# Patient Record
Sex: MALE | Race: WHITE | NOT HISPANIC OR LATINO | Employment: FULL TIME | URBAN - NONMETROPOLITAN AREA
[De-identification: names, ages, dates, MRNs, and addresses within clinical notes are randomized per-mention and may not be internally consistent; named-entity substitution may affect disease eponyms.]

---

## 2017-10-16 ENCOUNTER — TRANSCRIBE ORDERS (OUTPATIENT)
Dept: ADMINISTRATIVE | Facility: HOSPITAL | Age: 32
End: 2017-10-16

## 2017-10-16 DIAGNOSIS — M25.561 RIGHT KNEE PAIN, UNSPECIFIED CHRONICITY: Primary | ICD-10-CM

## 2017-10-19 ENCOUNTER — HOSPITAL ENCOUNTER (OUTPATIENT)
Dept: MRI IMAGING | Facility: HOSPITAL | Age: 32
Discharge: HOME | End: 2017-10-19
Admitting: PREVENTIVE MEDICINE

## 2017-10-19 DIAGNOSIS — M25.561 RIGHT KNEE PAIN, UNSPECIFIED CHRONICITY: ICD-10-CM

## 2017-10-19 PROCEDURE — 73721 MRI JNT OF LWR EXTRE W/O DYE: CPT

## 2018-07-17 ENCOUNTER — HOSPITAL ENCOUNTER (OUTPATIENT)
Dept: NEUROLOGY | Age: 33
Discharge: HOME | End: 2018-07-17
Payer: MEDICAID

## 2018-07-17 PROCEDURE — 95886 MUSC TEST DONE W/N TEST COMP: CPT | Performed by: PSYCHIATRY & NEUROLOGY

## 2018-07-17 PROCEDURE — 95909 NRV CNDJ TST 5-6 STUDIES: CPT

## 2018-07-17 PROCEDURE — 95886 MUSC TEST DONE W/N TEST COMP: CPT

## 2018-07-17 PROCEDURE — 95909 NRV CNDJ TST 5-6 STUDIES: CPT | Performed by: PSYCHIATRY & NEUROLOGY

## 2018-07-18 ENCOUNTER — HOSPITAL ENCOUNTER (EMERGENCY)
Age: 33
Discharge: HOME | End: 2018-07-18
Attending: EMERGENCY MEDICINE
Payer: MEDICAID

## 2018-07-18 VITALS
SYSTOLIC BLOOD PRESSURE: 151 MMHG | RESPIRATION RATE: 16 BRPM | OXYGEN SATURATION: 96 % | HEIGHT: 71 IN | TEMPERATURE: 98.2 F | DIASTOLIC BLOOD PRESSURE: 81 MMHG | HEART RATE: 55 BPM | BODY MASS INDEX: 28 KG/M2 | WEIGHT: 200 LBS

## 2018-07-18 DIAGNOSIS — R20.2 PARESTHESIA: Primary | ICD-10-CM

## 2018-07-18 DIAGNOSIS — M54.12 CERVICAL RADICULOPATHY: ICD-10-CM

## 2018-07-18 PROCEDURE — 99284 EMERGENCY DEPT VISIT MOD MDM: CPT | Performed by: EMERGENCY MEDICINE

## 2018-07-18 PROCEDURE — 99282 EMERGENCY DEPT VISIT SF MDM: CPT

## 2018-07-18 RX ORDER — GABAPENTIN 300 MG/1
300 CAPSULE ORAL DAILY
Qty: 10 CAPSULE | Refills: 0 | Status: SHIPPED | OUTPATIENT
Start: 2018-07-18 | End: 2022-07-18

## 2018-07-18 ASSESSMENT — ENCOUNTER SYMPTOMS
RHINORRHEA: 0
SORE THROAT: 0
ABDOMINAL PAIN: 0
SHORTNESS OF BREATH: 0
VOMITING: 0
BACK PAIN: 0
DIARRHEA: 0
NAUSEA: 0

## 2018-07-18 ASSESSMENT — PAIN DESCRIPTION - ORIENTATION: ORIENTATION: RIGHT

## 2018-07-18 ASSESSMENT — PAIN SCALES - GENERAL: PAINLEVEL_OUTOF10: 4

## 2018-07-18 ASSESSMENT — PAIN DESCRIPTION - PAIN TYPE: TYPE: ACUTE PAIN

## 2018-07-18 NOTE — ED PROVIDER NOTES
HISTORY   History reviewed. No pertinent past medical history. SURGICAL HISTORY       Past Surgical History:   Procedure Laterality Date    EYE SURGERY      MOUTH SURGERY           CURRENT MEDICATIONS       Discharge Medication List as of 7/18/2018 10:04 AM      CONTINUE these medications which have NOT CHANGED    Details   Montelukast Sodium (SINGULAIR PO) Take by mouth      albuterol sulfate  (90 BASE) MCG/ACT inhaler Inhale 2 puffs into the lungs every 6 hours as needed for Wheezing      predniSONE (DELTASONE) 20 MG tablet Take 20 mg by mouth daily      naproxen (NAPROSYN) 375 MG tablet Take 1 tablet by mouth 2 times daily, Disp-10 tablet, R-0             ALLERGIES     Patient has no known allergies. FAMILY HISTORY     History reviewed. No pertinent family history. SOCIAL HISTORY       Social History     Social History    Marital status:      Spouse name: N/A    Number of children: N/A    Years of education: N/A     Social History Main Topics    Smoking status: Former Smoker    Smokeless tobacco: None    Alcohol use No    Drug use: No    Sexual activity: Not Asked     Other Topics Concern    None     Social History Narrative    None       SCREENINGS             PHYSICAL EXAM    (up to 7 for level 4, 8 or more for level 5)     ED Triage Vitals [07/18/18 0913]   BP Temp Temp Source Pulse Resp SpO2 Height Weight   (!) 151/81 98.2 °F (36.8 °C) Oral 55 16 96 % 5' 11\" (1.803 m) 200 lb (90.7 kg)       Physical Exam   Constitutional: He is oriented to person, place, and time. He appears well-developed and well-nourished. No distress. HENT:   Head: Normocephalic and atraumatic. Eyes: Pupils are equal, round, and reactive to light. Neck: Normal range of motion. Neck supple. Cardiovascular: Normal rate, regular rhythm, normal heart sounds and intact distal pulses. Pulmonary/Chest: Effort normal and breath sounds normal. No respiratory distress. Abdominal: Soft.  Bowel No weakness appreciated. Marybel Clarke [id number] reviewed    CONSULTS:  None    PROCEDURES:  Unless otherwise noted below, none     Procedures    FINAL IMPRESSION      1. Paresthesia    2. Cervical radiculopathy, possible          DISPOSITION/PLAN   DISPOSITION Decision To Discharge 07/18/2018 10:00:01 AM      PATIENT REFERRED TO:  RAMIRO Martinez - CNP  [address]    In 2 days      [address] Brenda Perez [phone]    In 2 days        DISCHARGE MEDICATIONS:  Discharge Medication List as of 7/18/2018 10:04 AM      START taking these medications    Details   gabapentin (NEURONTIN) 300 MG capsule Take 1 capsule by mouth daily for 10 days. ., Disp-10 capsule, R-0Print                (Please note that portions of this note were completed with a voice recognition program.  Efforts were made to edit the dictations but occasionally words are mis-transcribed.)    Ken Rushing MD (electronically signed)  Attending Emergency Physician         Ken Rushing MD  07/18/18 2783

## 2019-01-10 ENCOUNTER — TRANSCRIBE ORDERS (OUTPATIENT)
Dept: PHYSICAL THERAPY | Facility: HOSPITAL | Age: 34
End: 2019-01-10

## 2019-01-10 ENCOUNTER — HOSPITAL ENCOUNTER (OUTPATIENT)
Dept: PHYSICAL THERAPY | Facility: HOSPITAL | Age: 34
Setting detail: THERAPIES SERIES
Discharge: HOME | End: 2019-01-10

## 2019-01-10 DIAGNOSIS — M54.2 CERVICALGIA: Primary | ICD-10-CM

## 2019-01-10 PROCEDURE — 97110 THERAPEUTIC EXERCISES: CPT | Performed by: PHYSICAL THERAPIST

## 2019-01-10 PROCEDURE — 97161 PT EVAL LOW COMPLEX 20 MIN: CPT | Performed by: PHYSICAL THERAPIST
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
## 2019-01-15 ENCOUNTER — HOSPITAL ENCOUNTER (OUTPATIENT)
Dept: PHYSICAL THERAPY | Facility: HOSPITAL | Age: 34
Setting detail: THERAPIES SERIES
Discharge: HOME | End: 2019-01-15

## 2019-01-15 DIAGNOSIS — M54.2 CERVICALGIA: Primary | ICD-10-CM

## 2019-01-15 PROCEDURE — 97110 THERAPEUTIC EXERCISES: CPT

## 2019-01-15 PROCEDURE — 97140 MANUAL THERAPY 1/> REGIONS: CPT
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
## 2019-01-17 ENCOUNTER — HOSPITAL ENCOUNTER (OUTPATIENT)
Dept: PHYSICAL THERAPY | Facility: HOSPITAL | Age: 34
Setting detail: THERAPIES SERIES
Discharge: HOME | End: 2019-01-17

## 2019-01-17 DIAGNOSIS — M54.2 CERVICALGIA: Primary | ICD-10-CM

## 2019-01-17 PROCEDURE — 97140 MANUAL THERAPY 1/> REGIONS: CPT | Performed by: PHYSICAL THERAPY ASSISTANT

## 2019-01-17 PROCEDURE — 97110 THERAPEUTIC EXERCISES: CPT | Performed by: PHYSICAL THERAPY ASSISTANT
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
## 2019-01-23 ENCOUNTER — HOSPITAL ENCOUNTER (OUTPATIENT)
Dept: PHYSICAL THERAPY | Facility: HOSPITAL | Age: 34
Setting detail: THERAPIES SERIES
Discharge: HOME | End: 2019-01-23

## 2019-01-23 DIAGNOSIS — M54.2 CERVICALGIA: Primary | ICD-10-CM

## 2019-01-23 PROCEDURE — 97110 THERAPEUTIC EXERCISES: CPT | Performed by: PHYSICAL THERAPIST

## 2019-01-23 NOTE — THERAPY TREATMENT NOTE
"    Outpatient Physical Therapy Ortho Treatment Note  [facility]     Patient Name: Jayce Gonzalez III  : 1985  MRN: [id number]  Today's Date: 2019      Visit Date: 2019  Subjective Improvement:   \"not yet\"    Attendance:   Approved:        7 visits/28 units 1-10/2-9   MD follow up: TBD           date:    2019      Visit Dx:    ICD-10-CM ICD-9-CM   1. Cervicalgia M54.2 723.1       There is no problem list on file for this patient.       Past Medical History:   Diagnosis Date   • GERD (gastroesophageal reflux disease)         Past Surgical History:   Procedure Laterality Date   • EYE SURGERY     • KNEE ARTHROSCOPY Right        PT Ortho     Row Name 19 0800       Subjective Comments    Subjective Comments  pt reports no change in R UE paresthesia, feels his neck is looser and has more ROM since starting PT.   -BS       Precautions and Contraindications    Precautions/Limitations  no known precautions/limitations  -BS       Subjective Pain    Able to rate subjective pain?  yes  -BS    Pre-Treatment Pain Level  2  -BS    Post-Treatment Pain Level  2  -BS      User Key  (r) = Recorded By, (t) = Taken By, (c) = Cosigned By    Initials Name Provider Type    Romero Villanueva, PT Physical Therapist                      PT Assessment/Plan     Row Name 19 1000          PT Assessment    Assessment Comments  limited by R>L cervical AROM, possibly due to UT tightness. Reduced R UE paresthesia with flexion based ROM of lower cervical/upper thoracic spine. Pt with reduced neck pain with manual cervical traction. Will attempt mechanical cervical traction to assess effect on R UE and neck symptoms.  -BS        PT Plan    PT Frequency  2x/week  -BS     Predicted Duration of Therapy Intervention (Therapy Eval)  3 weeks  -BS     PT Plan Comments  attempt mechanical cervical traction. Start at 18-20 lbs.  -BS       User Key  (r) = Recorded By, (t) = Taken By, (c) = Cosigned By    " Initials Name Provider Type    BS Romero Garcia, PT Physical Therapist              Exercises     Row Name 01/23/19 0800             Subjective Comments    Subjective Comments  pt reports no change in R UE paresthesia, feels his neck is looser and has more ROM since starting PT.   -BS         Subjective Pain    Able to rate subjective pain?  yes  -BS      Pre-Treatment Pain Level  2  -BS      Post-Treatment Pain Level  2  -BS         Exercise 1    Exercise Name 1  PRO ll UE  -BS      Time 1  8'  -BS      Additional Comments  fwd/bwd-4 min ea  -BS         Exercise 2    Exercise Name 2  seated thoracic flex w/ cervical flex  -BS      Sets 2  2  -BS      Reps 2  10  -BS      Time 2  reduced R UE paresthesia   -BS         Exercise 3    Exercise Name 3  seated cervical flex w/ self OP  -BS      Sets 3  1  -BS      Reps 3  10  -BS         Exercise 4    Exercise Name 4  elephant man stretch  -BS      Sets 4  1  -BS      Reps 4  10  -BS         Exercise 5    Exercise Name 5  seated cervical extrension  -BS      Sets 5  1  -BS      Reps 5  10  -BS         Exercise 6    Exercise Name 6  seated cervical retractions w/ self OP  -BS      Sets 6  1  -BS      Reps 6  10  -BS         Exercise 7    Exercise Name 7  supine cervical retractions  -BS      Sets 7  1  -BS      Reps 7  10  -BS         Exercise 8    Exercise Name 8  supine cervical ext w/ head off table  -BS      Sets 8  1  -BS      Reps 8  5  -BS      Additional Comments  d/c'd-increased dizziness  -BS         Exercise 9    Exercise Name 9  seated thoracic ext S w/ self mob (leaning against towel roll)  -BS      Sets 9  1  -BS      Reps 9  10  -BS        User Key  (r) = Recorded By, (t) = Taken By, (c) = Cosigned By    Initials Name Provider Type    BS Romero Garcia, PT Physical Therapist                         PT OP Goals     Row Name 01/23/19 0800          PT Short Term Goals    STG Date to Achieve  01/31/19  -BS     STG 1  Pt independent with HEP  -BS     STG 1  Progress  Ongoing  -BS     STG 2  Improve cervical AROM to % (all planes)  -BS     STG 2 Progress  Ongoing  -BS     STG 3  Reduce neck pain by 75% performing daily tasks  -BS     STG 3 Progress  Ongoing  -BS     STG 4  Improve R UE light touch sensation (all dermatomes C4-T1) to WFL  -BS     STG 4 Progress  Ongoing  -BS        Time Calculation    PT Goal Re-Cert Due Date  01/31/19  -BS       User Key  (r) = Recorded By, (t) = Taken By, (c) = Cosigned By    Initials Name Provider Type    Romero Villanueva, PT Physical Therapist          Therapy Education  Given: HEP, Symptoms/condition management  Program: New, Reinforced  How Provided: Verbal, Demonstration  Provided to: Patient  Level of Understanding: Teach back education performed              Time Calculation:   Start Time: 0804  Stop Time: 0850  Time Calculation (min): 46 min  Total Timed Code Minutes- PT: 46 minute(s)  Therapy Suggested Charges     Code   Minutes Charges    None           Therapy Charges for Today     Code Description Service Date Service Provider Modifiers Qty    70147270411 HC PT THER PROC EA 15 MIN 1/23/2019 Romero Garcia, PT GP 3                    Romero Garcia, PT  1/23/2019

## 2019-01-25 ENCOUNTER — HOSPITAL ENCOUNTER (OUTPATIENT)
Dept: PHYSICAL THERAPY | Facility: HOSPITAL | Age: 34
Setting detail: THERAPIES SERIES
Discharge: HOME | End: 2019-01-25

## 2019-01-25 DIAGNOSIS — M54.2 CERVICALGIA: Primary | ICD-10-CM

## 2019-01-25 PROCEDURE — 97140 MANUAL THERAPY 1/> REGIONS: CPT

## 2019-01-25 PROCEDURE — 97110 THERAPEUTIC EXERCISES: CPT

## 2019-01-25 PROCEDURE — 97012 MECHANICAL TRACTION THERAPY: CPT

## 2019-01-25 NOTE — THERAPY TREATMENT NOTE
"    Outpatient Physical Therapy Ortho Treatment Note  [facility]  Za Cobian PTA  19  8:54 AM       Patient Name: Jayce Gonzalez III  : 1985  MRN: [id number]  Today's Date: 2019      Visit Date: 2019    Subjective Improvement: \"motion has improved\"       Attendance:    Approved: 7 visits/28 units -10/2-           MD follow up: TBD            date: 19        Visit Dx:    ICD-10-CM ICD-9-CM   1. Cervicalgia M54.2 723.1       There is no problem list on file for this patient.       Past Medical History:   Diagnosis Date   • GERD (gastroesophageal reflux disease)         Past Surgical History:   Procedure Laterality Date   • EYE SURGERY     • KNEE ARTHROSCOPY Right        PT Ortho     Row Name 19 0800       Precautions and Contraindications    Precautions/Limitations  no known precautions/limitations  -KM       Posture/Observations    Posture/Observations Comments  Fwd/rounded shoulders. Fair postural awarenss  -KM    Row Name 19 0800       Subjective Comments    Subjective Comments  pt reports no change in R UE paresthesia, feels his neck is looser and has more ROM since starting PT.   -BS       Precautions and Contraindications    Precautions/Limitations  no known precautions/limitations  -BS       Subjective Pain    Able to rate subjective pain?  yes  -BS    Pre-Treatment Pain Level  2  -BS    Post-Treatment Pain Level  2  -BS      User Key  (r) = Recorded By, (t) = Taken By, (c) = Cosigned By    Initials Name Provider Type     Za Cobian PTA Physical Therapy Assistant    Romero Villanueva, PT Physical Therapist                      PT Assessment/Plan     Row Name 19 0800          PT Assessment    Functional Limitations  Performance in work activities;Performance in sport activities;Performance in self-care ADL;Performance in leisure activities  -KM     Impairments  Sensation;Range of motion;Posture;Pain;Muscle strength  " -KM     Assessment Comments  Attempted mechanical cervical traction and pt reports that hand felt like it was waking up some throughout RX- wishes to increase weight some next time.  -KM     Rehab Potential  Good  -KM     Patient/caregiver participated in establishment of treatment plan and goals  Yes  -KM     Patient would benefit from skilled therapy intervention  Yes  -KM        PT Plan    PT Frequency  2x/week  -KM     Predicted Duration of Therapy Intervention (Therapy Eval)  3 weeks  -KM     PT Plan Comments  Follow up results of cervical mechanical traction.  -KM       User Key  (r) = Recorded By, (t) = Taken By, (c) = Cosigned By    Initials Name Provider Type    Za Laura PTA Physical Therapy Assistant          Modalities     Row Name 01/25/19 0800             Traction 14174    Traction Type  Cervical  -KM      Rx Minutes  10  -KM      Position  Hook-lying  -KM      Weight  18  -KM      Hold  60  -KM      Relax  10  -KM        User Key  (r) = Recorded By, (t) = Taken By, (c) = Cosigned By    Initials Name Provider Type    Za Laura PTA Physical Therapy Assistant          Exercises     Row Name 01/25/19 0800             Precautions    Existing Precautions/Restrictions  no known precautions/restrictions  -KM         Subjective Comments    Subjective Comments  pt states that he feels that his motion is better since starting therapy  -KM         Subjective Pain    Able to rate subjective pain?  yes  -KM      Pre-Treatment Pain Level  1  -KM      Post-Treatment Pain Level  1  -KM         Aquatics    Aquatics performed?  No  -KM         Exercise 1    Exercise Name 1  PRO ll UE  -KM      Time 1  10 min  -KM      Additional Comments  fwd/bkw L3.0  -KM         Exercise 2    Exercise Name 2  UT S  -KM      Reps 2  2  -KM      Time 2  30 sec hold  -KM         Exercise 3    Exercise Name 3  Levator S  -KM      Reps 3  2  -KM      Time 3  30 sec hold  -KM         Exercise 4    Exercise  Name 4  Elephant S  -KM      Reps 4  2  -KM      Time 4  30 sec hold  -KM         Exercise 5    Exercise Name 5  Tband mid rows  -KM      Sets 5  2  -KM      Reps 5  10  -KM      Additional Comments  red  -KM         Exercise 6    Exercise Name 6  Tband shoulder ext  -KM      Sets 6  2  -KM      Reps 6  10  -KM      Additional Comments  red  -KM         Exercise 7    Exercise Name 7  Seated thoracic extension against bolster  -KM      Sets 7  1  -KM      Reps 7  10  -KM      Time 7  10 sec hold  -KM         Exercise 8    Exercise Name 8  Supine chin tucks  -KM      Sets 8  1  -KM      Reps 8  10  -KM      Time 8  5 sec hold  -KM         Exercise 9    Exercise Name 9  Manual  -KM      Time 9  8 min  -KM         Exercise 10    Exercise Name 10  Cervical mechanical traction  -KM      Time 10  10 min  -KM        User Key  (r) = Recorded By, (t) = Taken By, (c) = Cosigned By    Initials Name Provider Type    Za Laura PTA Physical Therapy Assistant                        Manual Rx (last 36 hours)      Manual Treatments     Row Name 01/25/19 0700             Manual Rx 1    Manual Rx 1 Location  CS, seated  -KM      Manual Rx 1 Type  STM/MFR UT/LS, rhomboids  -KM      Manual Rx 1 Duration  8'  -KM        User Key  (r) = Recorded By, (t) = Taken By, (c) = Cosigned By    Initials Name Provider Type    Za Laura PTA Physical Therapy Assistant          PT OP Goals     Row Name 01/25/19 0800          PT Short Term Goals    STG Date to Achieve  01/31/19  -KM     STG 1  Pt independent with HEP  -KM     STG 1 Progress  Ongoing  -KM     STG 2  Improve cervical AROM to % (all planes)  -KM     STG 2 Progress  Ongoing  -KM     STG 3  Reduce neck pain by 75% performing daily tasks  -KM     STG 3 Progress  Ongoing  -KM     STG 4  Improve R UE light touch sensation (all dermatomes C4-T1) to WFL  -KM     STG 4 Progress  Ongoing  -KM        Time Calculation    PT Goal Re-Cert Due Date  01/31/19  -KM        User Key  (r) = Recorded By, (t) = Taken By, (c) = Cosigned By    Initials Name Provider Type    Za Laura, FRANCISCA Physical Therapy Assistant          Therapy Education  Given: HEP, Symptoms/condition management  Program: New, Reinforced  How Provided: Verbal, Demonstration  Provided to: Patient  Level of Understanding: Teach back education performed              Time Calculation:   Start Time: 0800  Stop Time: 0853  Time Calculation (min): 53 min  PT Non-Billable Time (min): 10 min  Total Timed Code Minutes- PT: 43 minute(s)  Therapy Suggested Charges     Code   Minutes Charges    None           Therapy Charges for Today     Code Description Service Date Service Provider Modifiers Qty    44779379609 HC PT THER PROC EA 15 MIN 1/25/2019 Za Snyder, PTA GP 2    77890766805 HC PT MANUAL THERAPY EA 15 MIN 1/25/2019 Za Snyder, PTA GP 1    59918473701 HC PT TRACTION CERVICAL 1/25/2019 Za Snyder, PTA GP 1                    Za Snyder PTA  1/25/2019

## 2019-01-28 ENCOUNTER — OFFICE VISIT (OUTPATIENT)
Dept: SLEEP MEDICINE | Facility: HOSPITAL | Age: 34
End: 2019-01-28

## 2019-01-28 VITALS
OXYGEN SATURATION: 96 % | HEIGHT: 71 IN | DIASTOLIC BLOOD PRESSURE: 90 MMHG | BODY MASS INDEX: 33.18 KG/M2 | SYSTOLIC BLOOD PRESSURE: 125 MMHG | WEIGHT: 237 LBS | HEART RATE: 118 BPM

## 2019-01-28 DIAGNOSIS — G47.33 OBSTRUCTIVE SLEEP APNEA, ADULT: Primary | ICD-10-CM

## 2019-01-28 PROCEDURE — 99203 OFFICE O/P NEW LOW 30 MIN: CPT | Performed by: INTERNAL MEDICINE

## 2019-01-28 RX ORDER — GABAPENTIN 300 MG/1
300 CAPSULE ORAL
COMMUNITY
Start: 2018-07-18 | End: 2020-05-19

## 2019-01-28 RX ORDER — RANITIDINE 150 MG/1
TABLET ORAL
COMMUNITY
Start: 2019-01-23 | End: 2020-07-02

## 2019-01-28 RX ORDER — ALBUTEROL SULFATE 90 UG/1
AEROSOL, METERED RESPIRATORY (INHALATION)
COMMUNITY
End: 2022-04-11

## 2019-01-28 NOTE — PROGRESS NOTES
New Patient Sleep Medicine Consultation    Encounter Date: 1/28/2019         Patient's PCP: Ruby Feliciano APRN  Referring provider: No ref. provider found  Reason for consultation chief complaint: snoring, witnessed apneas and excessive daytime sleepiness    Jayce Gonzalez III is a 33 y.o. male who admits to snoring, unrestful sleep, gasping during sleep, excessive daytime sleepiness, stop breathing during sleep, sleeping less than 6 hours per night, Disturbed or restless sleep, choking duing sleep, Up to the bathroom at night, night sweats, teeth grinding, difficulty falling asleep and difficulty staying asleep. He denies cataplexy, sleep paralysis, or hypnagogic hallucinations. His bedtime is ~ 2015. He  falls asleep after 10-20 minutes, and is up 5 times per night. He wakes up ~ 3250-5484. He endorses 5-6 hours of sleep. He drinks 0 cups of coffee, 0 teas, and 0 sodas per day. He drinks 0 alcoholic beverages per week. He is not a current smoker. He does not take sedatives or hypnotics. He has some sleepiness with driving. He naps unintentionally.    Gay - 11    Past Medical History:   Diagnosis Date   • GERD (gastroesophageal reflux disease)      Social History     Socioeconomic History   • Marital status:      Spouse name: Not on file   • Number of children: Not on file   • Years of education: Not on file   • Highest education level: Not on file   Social Needs   • Financial resource strain: Not on file   • Food insecurity - worry: Not on file   • Food insecurity - inability: Not on file   • Transportation needs - medical: Not on file   • Transportation needs - non-medical: Not on file   Occupational History   • Not on file   Tobacco Use   • Smoking status: Former Smoker   Substance and Sexual Activity   • Alcohol use: No     Frequency: Never   • Drug use: Not on file   • Sexual activity: Not on file   Other Topics Concern   • Not on file   Social History Narrative   • Not on file   , 2  "kids  Unemployed but was [employer]   No family history on file.  1 brothers and 0 sisters  Other family history + for: cancer  Smoking history: smoked 0.5-1 ppds from age 13 until 16  FH of sleep disorders: none known    Review of Systems:  Constitutional: negative  Eyes: negative  Ears, nose, mouth, throat, and face: negative  Respiratory: negative  Cardiovascular: negative  Gastrointestinal: negative  Genitourinary:negative  Integument/breast: negative  Hematologic/lymphatic: negative  Musculoskeletal:negative  Neurological: positive for neck pain  Behavioral/Psych: positive for sexual difficulty  Endocrine: negative  Allergic/Immunologic: negative Patient advised to discuss any positive ROS with PCP.      Vitals:    01/28/19 1116   BP: 125/90   Pulse: 118   SpO2: 96%     Body mass index is 33.05 kg/m². Patient's Body mass index is 33.05 kg/m². BMI is above normal parameters. Recommendations include: referral to primary care.  Neck circumference: 16\"          General: Alert. Cooperative. Well developed. No acute distress.             Head:  Normocephalic. Symmetrical. Atraumatic.              Eyes: Sclera clear. No icterus. PERRLA. Mild left eye strabismus - s/p surgery as a child             Ears: No deformities. Normal hearing.             Nose: No septal deviation. No drainage.          Throat: No oral lesions. No thrush. Moist mucous membranes. Trachea midline    Tongue is enlarged    Dentition is fair with small mandible, internal rotation of lower teeth       Pharynx: Posterior pharyngeal pillars are narrow    Mallampati score of IV (only hard palate visible) with low lying soft palate    Pharynx is normal with unrermarkable tonsils   Chest Wall:  Normal shape. Symmetric expansion with respiration. No tenderness.          Lungs:  Clear to auscultation bilaterally. No wheezes. No rhonchi. No rales. Respirations regular, even and unlabored.            Heart:  Regular rhythm and normal rate. Normal " S1 and S2. No murmur.     Abdomen:  Soft, non-tender and non-distended. Normal bowel sounds. No masses.  Extremities:  Moves all extremities well. No edema.           Pulses: Pulses palpable and equal bilaterally.               Skin: Dry. Intact. No bleeding. No rash.           Neuro: Moves all 4 extremities and cranial nerves grossly intact.  Psychiatric: Normal mood and affect.      Current Outpatient Medications:   •  gabapentin (NEURONTIN) 300 MG capsule, Take 300 mg by mouth., Disp: , Rfl:   •  albuterol sulfate  (90 Base) MCG/ACT inhaler, Inhale., Disp: , Rfl:   •  raNITIdine (ZANTAC) 150 MG tablet, , Disp: , Rfl:     WBC   Date Value Ref Range Status   05/22/2015 8.14 4.80 - 10.80 K/mcL Final     RBC   Date Value Ref Range Status   05/22/2015 4.94 4.80 - 5.90 M/mcL Final     Hemoglobin   Date Value Ref Range Status   05/22/2015 14.8 14.0 - 18.0 g/dL Final     Hematocrit   Date Value Ref Range Status   05/22/2015 42.7 40.0 - 52.0 % Final     MCV   Date Value Ref Range Status   05/22/2015 86.4 82.0 - 95.0 fL Final     MCH   Date Value Ref Range Status   05/22/2015 30.0 28.0 - 32.0 pg Final     MCHC   Date Value Ref Range Status   05/22/2015 34.7 33.0 - 36.0 gm/dL Final     RDW   Date Value Ref Range Status   05/22/2015 13.6 12 - 15 % Final     RDW-SD   Date Value Ref Range Status   05/22/2015 42.4 40.0 - 54.0 fL Final     Platelets   Date Value Ref Range Status   05/22/2015 231 130 - 400 K/mcL Final     Neutrophil Rel %   Date Value Ref Range Status   05/22/2015 53.90 39.00 - 78.00 % Final     Lymphocyte Rel %   Date Value Ref Range Status   05/22/2015 37.00 15.00 - 45.00 % Final     Monocyte Rel %   Date Value Ref Range Status   05/22/2015 5.90 4.00 - 12.00 % Final     Eosinophil Rel %   Date Value Ref Range Status   05/22/2015 2.90 0.00 - 4.00 % Final     Basophil Rel %   Date Value Ref Range Status   05/22/2015 0.20 0.00 - 2.00 % Final     Neutrophils Absolute   Date Value Ref Range Status    05/22/2015 4.38 1.87 - 8.40 K/mcL Final     Lymphocytes Absolute   Date Value Ref Range Status   05/22/2015 3.01 0.72 - 4.86 K/mcL Final     Monocytes Absolute   Date Value Ref Range Status   05/22/2015 0.48 0.19 - 1.30 K/mcL Final     Eosinophils Absolute   Date Value Ref Range Status   05/22/2015 0.24 0.00 - 0.70 K/mcL Final     Basophils Absolute   Date Value Ref Range Status   05/22/2015 0.02 0.00 - 0.20 K/mcL Final       ASSESSMENT:  1. Obstructive sleep apnea   1. Check home sleep study   2. Neck pain  1. Per pcp - on gabapentin    RTC in 3 months         This document has been electronically signed by Cooper Garcia MD on January 28, 2019         CC: Ruby Feliciano, WHITNEY          No ref. provider found

## 2019-01-29 ENCOUNTER — HOSPITAL ENCOUNTER (OUTPATIENT)
Dept: PHYSICAL THERAPY | Facility: HOSPITAL | Age: 34
Setting detail: THERAPIES SERIES
Discharge: HOME | End: 2019-01-29

## 2019-01-29 DIAGNOSIS — M54.2 CERVICALGIA: Primary | ICD-10-CM

## 2019-01-29 PROCEDURE — 97012 MECHANICAL TRACTION THERAPY: CPT | Performed by: PHYSICAL THERAPIST

## 2019-01-29 PROCEDURE — 97110 THERAPEUTIC EXERCISES: CPT | Performed by: PHYSICAL THERAPIST

## 2019-01-29 NOTE — THERAPY TREATMENT NOTE
"    Outpatient Physical Therapy Ortho Treatment Note  [facility]     Patient Name: Jayce Gonzalez III  : 1985  MRN: [id number]  Today's Date: 2019      Visit Date: 2019     Subjective Improvement: \"motion has improved\"       Attendance: 56/6   Approved: 7 visits/28 units 1-10/2-9           MD follow up: TBD            date: 19            Visit Dx:    ICD-10-CM ICD-9-CM   1. Cervicalgia M54.2 723.1       There is no problem list on file for this patient.       Past Medical History:   Diagnosis Date   • GERD (gastroesophageal reflux disease)         Past Surgical History:   Procedure Laterality Date   • EYE SURGERY     • KNEE ARTHROSCOPY Right        PT Ortho     Row Name 19 1000       Subjective Comments    Subjective Comments  Pt states he felt like the traction helped, was a little sore the next day but okay  -KG       Precautions and Contraindications    Precautions/Limitations  no known precautions/limitations  -KG       Subjective Pain    Post-Treatment Pain Level  4  -KG       Posture/Observations    Posture/Observations Comments  No acute distress. Fair postural awarness. Forward, rounded shoulders posture.  -KG      User Key  (r) = Recorded By, (t) = Taken By, (c) = Cosigned By    Initials Name Provider Type    KG Nupur Orr, PT Physical Therapist                      PT Assessment/Plan     Row Name 19 1000          PT Assessment    Functional Limitations  Performance in work activities;Performance in sport activities;Performance in self-care ADL;Performance in leisure activities  -KG     Impairments  Sensation;Range of motion;Posture;Pain;Muscle strength  -KG     Assessment Comments  Pt did well with new therex this date for cervical stability. Frequent cuing required for good posture throughout but responds to cues well. Pt reports cervical mechanical traction felt good again this date, had improved symptoms in RUE during traction.  -KG     Rehab " Potential  Good  -KG     Patient/caregiver participated in establishment of treatment plan and goals  Yes  -KG     Patient would benefit from skilled therapy intervention  Yes  -KG        PT Plan    PT Frequency  2x/week  -KG     Predicted Duration of Therapy Intervention (Therapy Eval)  3 weeks  -KG     PT Plan Comments  Continue cervical mechanical traction. Cervical isometrics seated next visit. Continue cervical/scap stability activities. Possible doorway stretch or sup pec stretch next.  -KG       User Key  (r) = Recorded By, (t) = Taken By, (c) = Cosigned By    Initials Name Provider Type    ISATU Nupur Orr, PT Physical Therapist          Modalities     Row Name 01/29/19 1000             Ice    Patient denies application of Ice  Yes  -KG      Patient reports will apply ice at home to involved area  Yes  -KG         Traction 76956    Traction Type  Cervical  -KG      Rx Minutes  10  -KG      Position  Hook-lying  -KG      Weight  19  -KG      Hold  60  -KG      Relax  10  -KG        User Key  (r) = Recorded By, (t) = Taken By, (c) = Cosigned By    Initials Name Provider Type    ISATU Nupur Orr, PT Physical Therapist          Exercises     Row Name 01/29/19 1000             Precautions    Existing Precautions/Restrictions  no known precautions/restrictions  -KG         Subjective Comments    Subjective Comments  Pt states he felt like the traction helped, was a little sore the next day but okay  -KG         Subjective Pain    Able to rate subjective pain?  yes  -KG      Pre-Treatment Pain Level  4  -KG      Post-Treatment Pain Level  4  -KG         Aquatics    Aquatics performed?  No  -KG         Exercise 1    Exercise Name 1  PRO ll UE  -KG      Time 1  8 min  -KG      Additional Comments  fwd/bwd-4 min ea  -KG         Exercise 2    Exercise Name 2  UT S  -KG      Cueing 2  Verbal  -KG      Reps 2  2  -KG      Time 2  30 sec hold  -KG         Exercise 3    Exercise Name 3  Levator S  -KG      Cueing 3  " Verbal  -KG      Reps 3  2  -KG      Time 3  30 sec hold  -KG         Exercise 4    Exercise Name 4  Seated thoracic extension against bolster  -KG      Cueing 4  Verbal  -KG      Sets 4  1  -KG      Reps 4  10  -KG      Time 4  10\" hold  -KG         Exercise 5    Exercise Name 5  PN No $ Ret w/ER posture and N gliding  -KG      Cueing 5  Verbal  -KG      Sets 5  2  -KG      Reps 5  10  -KG         Exercise 6    Exercise Name 6  Elephant stretch  -KG      Cueing 6  Verbal  -KG      Reps 6  3  -KG      Time 6  15\" hold  -KG      Additional Comments  Hands behind head  -KG         Exercise 7    Exercise Name 7  Tband mid rows/posture re-ed  -KG      Cueing 7  Verbal  -KG      Sets 7  2  -KG      Reps 7  10  -KG      Additional Comments  Red tband  -KG         Exercise 8    Exercise Name 8  Tband shoulder ext with scap retraction  -KG      Cueing 8  Verbal  -KG      Sets 8  2  -KG      Reps 8  10  -KG      Additional Comments  Red tband  -KG         Exercise 9    Exercise Name 9  Supine cervical isometrics bilateral sidebend  -KG      Cueing 9  Verbal;Tactile  -KG      Sets 9  1  -KG      Reps 9  10 ea  -KG      Time 9  5\" hold  -KG         Exercise 10    Exercise Name 10  Supine chin tucks  -KG      Cueing 10  Verbal;Tactile  -KG      Sets 10  1  -KG      Reps 10  15  -KG      Time 10  5\" hold  -KG         Exercise 11    Exercise Name 11  Cervical mechanical traction  -KG      Time 11  10 min  -KG      Additional Comments  See modalities section for details.  -KG        User Key  (r) = Recorded By, (t) = Taken By, (c) = Cosigned By    Initials Name Provider Type    KG Nupur Orr, PT Physical Therapist                        Manual Rx (last 36 hours)      Manual Treatments     Row Name 01/29/19 1000             Manual Rx 1    Manual Rx 1 Location  Cervical spine  -KG      Manual Rx 1 Type  STM/MFR UT/LS, rhomboids  -KG      Manual Rx 1 Duration  7 min  -KG        User Key  (r) = Recorded By, (t) = Taken By, (c) " = Cosigned By    Initials Name Provider Type    ISATU Nupur Orr, PT Physical Therapist          PT OP Goals     Row Name 01/29/19 1000          PT Short Term Goals    STG Date to Achieve  01/31/19  -KG     STG 1  Pt independent with HEP  -KG     STG 1 Progress  Ongoing  -KG     STG 2  Improve cervical AROM to % (all planes)  -KG     STG 2 Progress  Ongoing  -KG     STG 3  Reduce neck pain by 75% performing daily tasks  -KG     STG 3 Progress  Ongoing  -KG     STG 4  Improve R UE light touch sensation (all dermatomes C4-T1) to WFL  -KG     STG 4 Progress  Ongoing  -KG        Time Calculation    PT Goal Re-Cert Due Date  01/31/19  -KG       User Key  (r) = Recorded By, (t) = Taken By, (c) = Cosigned By    Initials Name Provider Type    ISATU Nupur Orr, PT Physical Therapist          Therapy Education  Given: HEP, Symptoms/condition management, Pain management, Posture/body mechanics  Program: Reinforced  How Provided: Verbal  Provided to: Patient  Level of Understanding: Verbalized, Demonstrated              Time Calculation:   Start Time: 1017  Stop Time: 1112  Time Calculation (min): 55 min  Total Timed Code Minutes- PT: 45 minute(s)  Therapy Suggested Charges     Code   Minutes Charges    None           Therapy Charges for Today     Code Description Service Date Service Provider Modifiers Qty    52066928500 HC PT THER PROC EA 15 MIN 1/29/2019 Nupur Orr, PT GP 3    48770263864 HC PT TRACTION CERVICAL 1/29/2019 Nupur Orr, PT GP 1                    Nupru Orr, PT  1/29/2019

## 2019-01-31 ENCOUNTER — HOSPITAL ENCOUNTER (OUTPATIENT)
Dept: SLEEP MEDICINE | Facility: HOSPITAL | Age: 34
Discharge: HOME | End: 2019-01-31
Attending: INTERNAL MEDICINE | Admitting: INTERNAL MEDICINE

## 2019-01-31 ENCOUNTER — HOSPITAL ENCOUNTER (OUTPATIENT)
Dept: PHYSICAL THERAPY | Facility: HOSPITAL | Age: 34
Setting detail: THERAPIES SERIES
Discharge: HOME | End: 2019-01-31

## 2019-01-31 DIAGNOSIS — G47.33 OBSTRUCTIVE SLEEP APNEA, ADULT: ICD-10-CM

## 2019-01-31 DIAGNOSIS — M54.2 CERVICALGIA: Primary | ICD-10-CM

## 2019-01-31 PROCEDURE — 95806 SLEEP STUDY UNATT&RESP EFFT: CPT

## 2019-01-31 PROCEDURE — 97110 THERAPEUTIC EXERCISES: CPT | Performed by: PHYSICAL THERAPIST

## 2019-01-31 PROCEDURE — 95806 SLEEP STUDY UNATT&RESP EFFT: CPT | Performed by: INTERNAL MEDICINE

## 2019-01-31 PROCEDURE — 97140 MANUAL THERAPY 1/> REGIONS: CPT | Performed by: PHYSICAL THERAPIST

## 2019-01-31 PROCEDURE — 97012 MECHANICAL TRACTION THERAPY: CPT | Performed by: PHYSICAL THERAPIST

## 2019-02-01 NOTE — THERAPY PROGRESS REPORT/RE-CERT
Outpatient Physical Therapy Ortho Progress Note  [facility]     Patient Name: Jayce Gonzalez III  : 1985  MRN: [id number]  Today's Date: 2019      Visit Date: 2019     Subjective Improvement: 30-40%       Attendance:    Approved: 7 visits/28 units 1-10/2-9           MD follow up: TBD            date: 19        There is no problem list on file for this patient.       Past Medical History:   Diagnosis Date   • GERD (gastroesophageal reflux disease)         Past Surgical History:   Procedure Laterality Date   • EYE SURGERY     • KNEE ARTHROSCOPY Right        Visit Dx:     ICD-10-CM ICD-9-CM   1. Cervicalgia M54.2 723.1           PT Ortho     Row Name 19 1000       Subjective Comments    Subjective Comments  Pt states he was more sore yesterday in his neck. Not sure what caused it. States he feel like the traction is helping his RUE nymbness but it may be making his neck sore too. Overall reports ~40% improvement. States his motion in his neck is getting better.  -KG       Precautions and Contraindications    Precautions/Limitations  no known precautions/limitations  -KG       Subjective Pain    Post-Treatment Pain Level  2  -KG       Posture/Observations    Posture/Observations Comments  No acute distress. Fair postural awarness. Forward, rounded shoulders posture.  -KG       Quarter Clearing    Quarter Clearing  Upper Quarter Clearing  -KG       Sensory Screen for Light Touch- Upper Quarter Clearing    C4 (posterior shoulder)  Bilateral:;Intact  -KG    C5 (lateral upper arm)  Right:;Diminished;Left:;Intact  -KG    C6 (tip of thumb)  Bilateral:;Intact  -KG    C7 (tip of 3rd finger)  Bilateral:;Intact  -KG    C8 (tip of 5th finger)  Right:;Diminished;Left:;Intact  -KG    T1 (medial lower arm)  Bilateral:;Intact  -KG       Myotomal Screen- Upper Quarter Clearing    Shoulder flexion (C5)  Right:;4+ (Good +);Left:;5 (Normal)  -KG    Elbow flexion/wrist extension (C6)   Bilateral:;5 (Normal)  -KG    Elbow extension/wrist flexion (C7)  Bilateral:;5 (Normal)  -KG    Finger flexion/ (C8)  Bilateral:;5 (Normal)  -KG    Finger abduction (T1)  Bilateral:;5 (Normal)  -KG      Bilateral:;WNL  -KG       Cervical/Shoulder ROM Screen    Cervical flexion  Impaired  -KG    Cervical extension  Impaired  -KG    Cervical lateral flexion  Impaired  -KG    Cervical rotation  Impaired  -KG       Special Tests/Palpation    Special Tests/Palpation  Cervical/Thoracic  -KG       Cervical Palpation    Cervical Palpation- Location?  Upper traps;Middle traps;Rhomboids;Suboccipital;Spinous process  -KG    Suboccipital  Bilateral:;Guarded/taut  -KG    Spinous Process  Tender C5-C6  -KG    Upper Traps  Bilateral:;Tender;Guarded/taut R>L  -KG    Middle Traps  Bilateral:;Tender;Guarded/taut R>L  -KG       General ROM    Head/Neck/Trunk  Neck Extension;Neck Flexion;Neck Lt Lateral Flexion;Neck Rt Lateral Flexion;Neck Lt Rotation;Neck Rt Rotation  -KG       Head/Neck/Trunk    Neck Extension AROM  55  -KG    Neck Flexion AROM  45  -KG    Neck Lt Lateral Flexion AROM  30  -KG    Neck Rt Lateral Flexion AROM  35  -KG    Neck Lt Rotation AROM  65  -KG    Neck Rt Rotation AROM  60  -KG       Sensation    Sensation WNL?  WFL  -KG    Light Touch  Partial deficits in the RUE  -KG      User Key  (r) = Recorded By, (t) = Taken By, (c) = Cosigned By    Initials Name Provider Type    KG Nupur Orr, PT Physical Therapist                PT Assessment/Plan     Row Name  01/31/18 1000          PT Assessment    Functional Limitations  Performance in work activities;Performance in sport activities;Performance in self-care ADL;Performance in leisure activities  -KG     Impairments  Sensation;Range of motion;Posture;Pain;Muscle strength  -KG     Assessment Comments  Pt progressing fairly well with PT at this time. Good improvements noted overall with cervical ROM but still more limited to R side. Continues to require  verbal/tactile cuing for posture throughout treatment but is becoming more self-aware. Continue to have numbness/parasthesia in RUE that does improve slightly during mechanical cervical traction. TTP/taut at R>L UT/levator and interscap muscles. Pt will continue to benefit from skilled PT services to futher improve cervical/postural stability, RUE symptoms, and overall tolerance to daily functional activities.   -KG     Rehab Potential  Good  -KG     Patient/caregiver participated in establishment of treatment plan and goals  Yes  -KG     Patient would benefit from skilled therapy intervention  Yes  -KG        PT Plan    PT Frequency  2x/week  -KG     Predicted Duration of Therapy Intervention (Therapy Eval)  3 weeks  -KG     PT Plan Comments  Will ask for additional visits. Follow up on cervical traction next week. Continue as appropriate. Resume cervical isometrics and tband activities for scap stability. Continue flexion based cervical stretches.  -KG       User Key  (r) = Recorded By, (t) = Taken By, (c) = Cosigned By    Initials Name Provider Type    KG Nupur Orr, PT Physical Therapist               Therapy Education  Given: HEP, Symptoms/condition management, Pain management, Posture/body mechanics  Program: Reinforced  How Provided: Verbal, Demonstration  Provided to: Patient  Level of Understanding: Teach back education performed, Verbalized, Demonstrated        PT OP Goals     Row Name  01/31/19 1000          PT Short Term Goals    STG Date to Achieve  02/21/19  -KG     STG 1  Pt independent with HEP  -KG     STG 1 Progress  Met;Ongoing  -KG     STG 2  Improve cervical AROM to % (all planes)  -KG     STG 2 Progress  Partially Met;Ongoing  -KG     STG 3  Reduce neck pain by 75% performing daily tasks  -KG     STG 3 Progress  Ongoing  -KG     STG 4  Improve R UE light touch sensation (all dermatomes C4-T1) to WFL  -KG     STG 4 Progress  Ongoing  -KG        Time Calculation    PT Goal Re-Cert  "Due Date  02/21/19  -KG       User Key  (r) = Recorded By, (t) = Taken By, (c) = Cosigned By    Initials Name Provider Type    Nupur Raman, PT Physical Therapist                 Exercises     Row Name 01/31/19 1000             Subjective Pain    Able to rate subjective pain?  yes  -KG      Pre-Treatment Pain Level  5  -KG         Exercise 1    Exercise Name 1  PRO ll UE  -KG      Time 1  8 min  -KG         Exercise 2    Exercise Name 2  UT S  -KG      Cueing 2  Verbal  -KG      Reps 2  1  -KG      Time 2  30 sec hold  -KG         Exercise 3    Exercise Name 3  Levator S  -KG      Cueing 3  Verbal  -KG      Reps 3  1  -KG      Time 3  30 sec hold  -KG         Exercise 5    Exercise Name 5  PN No $ Ret w/ER posture and N gliding  -KG      Cueing 5  Verbal  -KG      Sets 5  2  -KG      Reps 5  10  -KG         Exercise 6    Exercise Name 6  Elephant stretch  -KG      Cueing 6  Verbal  -KG      Reps 6  2  -KG      Time 6  30\" hold  -KG      Additional Comments  Hands behind head  -KG        User Key  (r) = Recorded By, (t) = Taken By, (c) = Cosigned By    Initials Name Provider Type    Nupur Raman, PT Physical Therapist         Supine chin tucks: 2x10, 5\" hold  Seated thoracic extension against bolster: 1x10, 10\" hold    Cervical mechanical traction: 10 min, 19#. Hold 60\", relax 10\"; hooklying             Manual Rx (last 36 hours)      Manual Treatments     Row Name 01/31/19 1000             Manual Rx 1    Manual Rx 1 Location  Cervical spine  -KG      Manual Rx 1 Type  STM/MFR UT/LS, rhomboids; gentle distraction/PROM, side glides  -KG      Manual Rx 1 Duration  15 min  -KG        User Key  (r) = Recorded By, (t) = Taken By, (c) = Cosigned By    Initials Name Provider Type    Nupur Raman, PT Physical Therapist                     Outcome Measure Options: Neck Disability Index (NDI)  Neck Disability Index  Section 1 - Pain Intensity: The pain is moderate at the moment.  Section 2 - Personal " Care: I can look after myself normally, but it causes extra pain.  Section 3 - Lifting: Pain prevents me from lifting heavy weights, but I can manage light weights if they are conveniently positioned.  Section 4 - Work: I can hardly do any work at all.  Section 5 - Headaches: I have moderate headaches that come frequently  Section 6 - Concentration: I have a fair degree of difficulty concentrating.  Section 7 - Sleeping: My sleep is greatly disturbed for up to 3-5 hours.  Section 8 - Driving: I can't drive as long as I want because of moderate neck pain.  Section 9 - Reading: I can read as much as I want with moderate neck pain.  Section 10 - Recreation: I can hardly do recreational activities due to neck pain.  Neck Disability Index Score: 28  Neck Disability Index Comments: 56%      Time Calculation:     Therapy Suggested Charges     Code   Minutes Charges    None             Start Time: 1020  Stop Time: 1109  Time Calculation (min): 49 min  Total Timed Code Minutes- PT: 39 minute(s)     Therapy Charges for Today     Code Description Service Date Service Provider Modifiers Qty    59536826455 HC PT TRACTION CERVICAL 1/31/2019 Nupur Orr, PT GP 1    13764531044 HC PT THER PROC EA 15 MIN 1/31/2019 Nupur Orr, PT GP 1    85051671331 HC PT MANUAL THERAPY EA 15 MIN 1/31/2019 Nupur Orr, PT GP 1          PT G-Codes  Outcome Measure Options: Neck Disability Index (NDI)  Neck Disability Index Score: 28         Nupur Orr PT  1/31/2019

## 2019-02-05 ENCOUNTER — HOSPITAL ENCOUNTER (OUTPATIENT)
Dept: PHYSICAL THERAPY | Facility: HOSPITAL | Age: 34
Setting detail: THERAPIES SERIES
Discharge: HOME | End: 2019-02-05

## 2019-02-05 DIAGNOSIS — M54.2 CERVICALGIA: Primary | ICD-10-CM

## 2019-02-05 PROCEDURE — 97140 MANUAL THERAPY 1/> REGIONS: CPT

## 2019-02-05 PROCEDURE — 97012 MECHANICAL TRACTION THERAPY: CPT

## 2019-02-05 PROCEDURE — 97110 THERAPEUTIC EXERCISES: CPT

## 2019-02-05 NOTE — THERAPY TREATMENT NOTE
Outpatient Physical Therapy Ortho Treatment Note  [facility]     Patient Name: Jayce Gonzalez III  : 1985  MRN: [id number]  Today's Date: 2019      Visit Date: 2019  Subjective Improvement: 30-40%       Attendance:    Approved: 7 visits/28 units; 6 addit visits/18 units          MD follow up: TBD            date: 19      Visit Dx:    ICD-10-CM ICD-9-CM   1. Cervicalgia M54.2 723.1       There is no problem list on file for this patient.       Past Medical History:   Diagnosis Date   • GERD (gastroesophageal reflux disease)         Past Surgical History:   Procedure Laterality Date   • EYE SURGERY     • KNEE ARTHROSCOPY Right        PT Ortho     Row Name 19 1600       Precautions and Contraindications    Precautions/Limitations  no known precautions/limitations  -PM       Subjective Pain    Able to rate subjective pain?  yes  -PM    Pre-Treatment Pain Level  4  -PM      User Key  (r) = Recorded By, (t) = Taken By, (c) = Cosigned By    Initials Name Provider Type    PM Hillary Haro PTA Physical Therapy Assistant                      PT Assessment/Plan     Row Name 19 1600          PT Assessment    Assessment Comments  Pt did well with Rx with cues for form and posture. Gradually incr traction time; didn't notice knots in musculature today.   -PM        PT Plan    PT Frequency  2x/week  -PM     Predicted Duration of Therapy Intervention (Therapy Eval)  3 weeks  -PM     PT Plan Comments  resume CS isometriics for stability and cont posture virgie  -PM       User Key  (r) = Recorded By, (t) = Taken By, (c) = Cosigned By    Initials Name Provider Type    PM Hillary Haro PTA Physical Therapy Assistant          Modalities     Row Name 19 1600             Ice    Patient denies application of Ice  Yes  -PM         Traction 83198    Traction Type  Cervical  -PM      Rx Minutes  12  -PM      Position  Hook-lying  -PM      Weight  19  -PM      Hold   "60  -PM      Relax  10  -PM        User Key  (r) = Recorded By, (t) = Taken By, (c) = Cosigned By    Initials Name Provider Type    PM Hillary Haro PTA Physical Therapy Assistant          Exercises     Row Name 02/05/19 1600             Precautions    Existing Precautions/Restrictions  no known precautions/restrictions  -PM         Subjective Comments    Subjective Comments  Pt states he did a little better after last Rx, wasn't as sore.  -PM         Subjective Pain    Able to rate subjective pain?  yes  -PM      Pre-Treatment Pain Level  4  -PM      Post-Treatment Pain Level  1  -PM         Aquatics    Aquatics performed?  No  -PM         Exercise 1    Exercise Name 1  PRO ll UE  -PM      Time 1  8 min  -PM      Additional Comments  F/R  -PM         Exercise 2    Exercise Name 2  UT S  -PM      Cueing 2  Verbal  -PM      Reps 2  2  -PM      Time 2  30 sec hold  -PM         Exercise 3    Exercise Name 3  Levator S  -PM      Cueing 3  Verbal  -PM      Reps 3  2  -PM      Time 3  30 sec hold  -PM         Exercise 4    Exercise Name 4  Seated thoracic extension against bolster  -PM      Cueing 4  Verbal  -PM      Sets 4  1  -PM      Reps 4  10  -PM      Time 4  10\" hold  -PM         Exercise 5    Exercise Name 5  PN No $ Ret w/ER posture and N gliding  -PM      Cueing 5  Verbal  -PM      Sets 5  1  -PM      Reps 5  10  -PM         Exercise 6    Exercise Name 6  Elephant stretch  -PM      Cueing 6  Verbal  -PM      Reps 6  2  -PM      Time 6  30\" hold  -PM         Exercise 7    Exercise Name 7  Supine chin tucks  -PM      Cueing 7  Verbal;Tactile  -PM      Sets 7  1  -PM      Reps 7  15  -PM      Time 7  5\" hold  -PM         Exercise 8    Exercise Name 8  Manual  -PM      Time 8  12'  -PM      Additional Comments  see manual section  -PM         Exercise 9    Exercise Name 9  Cervical mechanical traction  -PM      Time 9  12'  -PM      Additional Comments  see modalities  -PM         Exercise 10    Exercise Name " 10  prone over pillow and util CS wedge YTI  -PM      Cueing 10  Verbal;Tactile  -PM      Sets 10  1  -PM      Reps 10  10  -PM        User Key  (r) = Recorded By, (t) = Taken By, (c) = Cosigned By    Initials Name Provider Type    Hillary Sullivan PTA Physical Therapy Assistant                        Manual Rx (last 36 hours)      Manual Treatments     Row Name 02/05/19 1500             Manual Rx 1    Manual Rx 1 Location  Cervical spine prone over pillow and CS wedge  -PM      Manual Rx 1 Type  STM/MFR UT/LS, rhomboids/mid traps; CS paraspinals, lat glides  -PM      Manual Rx 1 Duration  12 MIN'  -PM        User Key  (r) = Recorded By, (t) = Taken By, (c) = Cosigned By    Initials Name Provider Type    Hillary Sullivan PTA Physical Therapy Assistant          PT OP Goals     Row Name 02/05/19 1600          PT Short Term Goals    STG Date to Achieve  02/21/19  -PM     STG 1  Pt independent with HEP  -PM     STG 1 Progress  Met;Ongoing  -PM     STG 2  Improve cervical AROM to % (all planes)  -PM     STG 2 Progress  Partially Met;Ongoing  -PM     STG 3  Reduce neck pain by 75% performing daily tasks  -PM     STG 3 Progress  Ongoing  -PM     STG 4  Improve R UE light touch sensation (all dermatomes C4-T1) to WFL  -PM     STG 4 Progress  Ongoing  -PM        Time Calculation    PT Goal Re-Cert Due Date  02/21/19  -PM       User Key  (r) = Recorded By, (t) = Taken By, (c) = Cosigned By    Initials Name Provider Type    Hillary Sullivan PTA Physical Therapy Assistant          Therapy Education  Given: HEP, Symptoms/condition management, Pain management, Posture/body mechanics  Program: Reinforced  How Provided: Verbal, Demonstration  Provided to: Patient  Level of Understanding: Teach back education performed, Verbalized, Demonstrated              Time Calculation:   Start Time: 1606  Stop Time: 1710  Time Calculation (min): 64 min  Total Timed Code Minutes- PT: 52 minute(s)  Therapy Suggested  Charges     Code   Minutes Charges    None           Therapy Charges for Today     Code Description Service Date Service Provider Modifiers Qty    51462204619 HC PT THER PROC EA 15 MIN 2/5/2019 Hillary Haro, FRANCISCA GP 2    69658090796 HC PT MANUAL THERAPY EA 15 MIN 2/5/2019 Hillary Haro, PTA GP 1    14202420232 HC PT TRACTION CERVICAL 2/5/2019 Hillary Haro, FRANCISCA GP 1                    Hillary Haro PTA  2/5/2019

## 2019-02-07 ENCOUNTER — HOSPITAL ENCOUNTER (OUTPATIENT)
Dept: PHYSICAL THERAPY | Facility: HOSPITAL | Age: 34
Setting detail: THERAPIES SERIES
Discharge: HOME | End: 2019-02-07

## 2019-02-07 DIAGNOSIS — M54.2 CERVICALGIA: Primary | ICD-10-CM

## 2019-02-07 PROCEDURE — 97012 MECHANICAL TRACTION THERAPY: CPT

## 2019-02-07 PROCEDURE — 97140 MANUAL THERAPY 1/> REGIONS: CPT

## 2019-02-07 PROCEDURE — 97110 THERAPEUTIC EXERCISES: CPT

## 2019-02-07 NOTE — THERAPY TREATMENT NOTE
Outpatient Physical Therapy Ortho Treatment Note  [facility], [place], operated by [facility]  Za Cobian PTA  19  3:25 PM       Patient Name: Jayce Gonzalez III  : 1985  MRN: [id number]  Today's Date: 2019      Visit Date: 2019    Subjective Improvement: 30-40%      Attendance:    Approved: 7 visits/28 units; 6 additional visits/18 units           MD follow up: ROBBY            date: 19         Visit Dx:    ICD-10-CM ICD-9-CM   1. Cervicalgia M54.2 723.1       There is no problem list on file for this patient.       Past Medical History:   Diagnosis Date   • GERD (gastroesophageal reflux disease)         Past Surgical History:   Procedure Laterality Date   • EYE SURGERY     • KNEE ARTHROSCOPY Right        PT Ortho     Row Name 19 1400       Subjective Comments    Subjective Comments  pt statse that the numbness in his hand is getting better since starting mechanical traction  -KM       Precautions and Contraindications    Precautions/Limitations  no known precautions/limitations  -KM       Subjective Pain    Able to rate subjective pain?  yes  -KM    Pre-Treatment Pain Level  1  -KM       Posture/Observations    Posture/Observations Comments  Fair postural awareness. Fwd rounded shoulders  -KM    Row Name 19 1600       Precautions and Contraindications    Precautions/Limitations  no known precautions/limitations  -PM       Subjective Pain    Able to rate subjective pain?  yes  -PM    Pre-Treatment Pain Level  4  -PM      User Key  (r) = Recorded By, (t) = Taken By, (c) = Cosigned By    Initials Name Provider Type    PM Hillary Haro PTA Physical Therapy Assistant     Za Cobian PTA Physical Therapy Assistant                      PT Assessment/Plan     Row Name 19 1400          PT Assessment    Functional Limitations  Performance in work activities;Performance in sport activities;Performance in self-care ADL;Performance in leisure activities  -KM      Impairments  Sensation;Range of motion;Posture;Pain;Muscle strength  -KM     Assessment Comments  pt is more self aware of posture throughout treatment. pt has positive response from mechanical traction. Compliant with HEP  -KM     Rehab Potential  Good  -KM     Patient/caregiver participated in establishment of treatment plan and goals  Yes  -KM     Patient would benefit from skilled therapy intervention  Yes  -KM        PT Plan    PT Frequency  2x/week  -KM     Predicted Duration of Therapy Intervention (Therapy Eval)  3 weeks  -KM     PT Plan Comments  Resume CS isometrics next  -KM       User Key  (r) = Recorded By, (t) = Taken By, (c) = Cosigned By    Initials Name Provider Type    Za Laura PTA Physical Therapy Assistant          Modalities     Row Name 02/07/19 1400             Ice    Patient denies application of Ice  Yes  -KM         Traction 18572    Traction Type  Cervical  -KM      Rx Minutes  12  -KM      Position  Hook-lying  -KM      Weight  19  -KM      Hold  60  -KM      Relax  10  -KM        User Key  (r) = Recorded By, (t) = Taken By, (c) = Cosigned By    Initials Name Provider Type    Za Laura PTA Physical Therapy Assistant          Exercises     Row Name 02/07/19 1400             Precautions    Existing Precautions/Restrictions  no known precautions/restrictions  -KM         Subjective Comments    Subjective Comments  pt statse that the numbness in his hand is getting better since starting mechanical traction  -KM         Subjective Pain    Able to rate subjective pain?  yes  -KM      Pre-Treatment Pain Level  1  -KM      Post-Treatment Pain Level  1  -KM         Aquatics    Aquatics performed?  No  -KM         Exercise 1    Exercise Name 1  PRO ll UE  -KM      Time 1  10 min  -KM      Additional Comments  fwd/bkw  -KM         Exercise 2    Exercise Name 2  UT S  -KM      Cueing 2  Verbal  -KM      Reps 2  2  -KM      Time 2  30 sec hold  -KM          "Exercise 3    Exercise Name 3  Levator S  -KM      Cueing 3  Verbal  -KM      Reps 3  2  -KM      Time 3  30 sec hold  -KM         Exercise 4    Exercise Name 4  Seated thoracic extension against bolster  -KM      Cueing 4  Verbal  -KM      Sets 4  1  -KM      Reps 4  10  -KM      Time 4  10\" hold  -KM         Exercise 5    Exercise Name 5  PN No $ Ret w/ER posture and N gliding  -KM      Cueing 5  Verbal  -KM      Sets 5  1  -KM      Reps 5  10  -KM         Exercise 6    Exercise Name 6  Elephant stretch  -KM      Cueing 6  Verbal  -KM      Reps 6  2  -KM      Time 6  30\" hold  -KM         Exercise 7    Exercise Name 7  Supine chin tucks  -KM      Cueing 7  Verbal;Tactile  -KM      Sets 7  1  -KM      Reps 7  15  -KM      Time 7  5\" hold  -KM         Exercise 8    Exercise Name 8  Manual  -KM      Time 8  12'  -KM         Exercise 9    Exercise Name 9  Cervical mechanical traction  -KM      Time 9  12'  -KM         Exercise 10    Exercise Name 10  prone over pillow and util CS wedge YTI  -KM      Cueing 10  Verbal;Tactile  -KM      Sets 10  1  -KM      Reps 10  10  -KM        User Key  (r) = Recorded By, (t) = Taken By, (c) = Cosigned By    Initials Name Provider Type    Za Laura PTA Physical Therapy Assistant                        Manual Rx (last 36 hours)      Manual Treatments     Row Name 02/07/19 1300             Manual Rx 1    Manual Rx 1 Location  Cervical spine prone over pillow and CS wedge  -KM      Manual Rx 1 Type  STM/MFR UT/LS, rhomboids/mid traps; CS paraspinals, lat glides  -KM      Manual Rx 1 Duration  10 min  -KM        User Key  (r) = Recorded By, (t) = Taken By, (c) = Cosigned By    Initials Name Provider Type    Za Laura PTA Physical Therapy Assistant          PT OP Goals     Row Name 02/07/19 1400          PT Short Term Goals    STG Date to Achieve  02/21/19  -KM     STG 1  Pt independent with HEP  -KM     STG 1 Progress  Met;Ongoing  -KM     STG 2  " Improve cervical AROM to % (all planes)  -KM     STG 2 Progress  Partially Met;Ongoing  -KM     STG 3  Reduce neck pain by 75% performing daily tasks  -KM     STG 3 Progress  Ongoing  -KM     STG 4  Improve R UE light touch sensation (all dermatomes C4-T1) to WFL  -KM     STG 4 Progress  Ongoing  -KM        Time Calculation    PT Goal Re-Cert Due Date  02/21/19  -KM       User Key  (r) = Recorded By, (t) = Taken By, (c) = Cosigned By    Initials Name Provider Type    Za Laura PTA Physical Therapy Assistant          Therapy Education  Given: HEP, Symptoms/condition management, Pain management, Posture/body mechanics  Program: Reinforced  How Provided: Verbal, Demonstration  Provided to: Patient  Level of Understanding: Teach back education performed, Verbalized, Demonstrated              Time Calculation:   Start Time: 1431  Stop Time: 1521  Time Calculation (min): 50 min  Total Timed Code Minutes- PT: 50 minute(s)  Therapy Suggested Charges     Code   Minutes Charges    None           Therapy Charges for Today     Code Description Service Date Service Provider Modifiers Qty    89146424938 HC PT THER PROC EA 15 MIN 2/7/2019 Za Snyder, PTA GP 1    84067057148 HC PT MANUAL THERAPY EA 15 MIN 2/7/2019 Za Snyder, PTA GP 1    05817326277 HC PT TRACTION CERVICAL 2/7/2019 Za Snyder PTA GP 1                    Za Snyder PTA  2/7/2019

## 2019-02-12 ENCOUNTER — HOSPITAL ENCOUNTER (OUTPATIENT)
Dept: PHYSICAL THERAPY | Facility: HOSPITAL | Age: 34
Setting detail: THERAPIES SERIES
Discharge: HOME | End: 2019-02-12

## 2019-02-12 DIAGNOSIS — M54.2 CERVICALGIA: Primary | ICD-10-CM

## 2019-02-12 PROCEDURE — 97140 MANUAL THERAPY 1/> REGIONS: CPT

## 2019-02-12 PROCEDURE — 97110 THERAPEUTIC EXERCISES: CPT

## 2019-02-12 PROCEDURE — 97012 MECHANICAL TRACTION THERAPY: CPT

## 2019-02-12 NOTE — THERAPY TREATMENT NOTE
Outpatient Physical Therapy Ortho Treatment Note  [facility]     Patient Name: Jayce Gonzalez III  : 1985  MRN: [id number]  Today's Date: 2019      Visit Date: 2019  Subjective Improvement: 30%  radicular c/o's; movement in neck 60%   Attendance: 10/10   Approved: 7 visits/28 units; 6 additional visits/18 units           MD follow up: TBD            date: 19           Visit Dx:    ICD-10-CM ICD-9-CM   1. Cervicalgia M54.2 723.1       There is no problem list on file for this patient.       Past Medical History:   Diagnosis Date   • GERD (gastroesophageal reflux disease)         Past Surgical History:   Procedure Laterality Date   • EYE SURGERY     • KNEE ARTHROSCOPY Right        PT Ortho     Row Name 19 1600       Subjective Comments    Subjective Comments  Pt states that traction helps for a few days and then symptoms start to return.  -PM       Precautions and Contraindications    Precautions/Limitations  no known precautions/limitations  -PM       Subjective Pain    Able to rate subjective pain?  yes  -PM    Pre-Treatment Pain Level  3  -PM    Post-Treatment Pain Level  4  -PM       Posture/Observations    Posture/Observations Comments  intermittent cues posture  -PM      User Key  (r) = Recorded By, (t) = Taken By, (c) = Cosigned By    Initials Name Provider Type    PM Hillary Haro PTA Physical Therapy Assistant                      PT Assessment/Plan     Row Name 19 1600          PT Assessment    Assessment Comments  Did well with wall CT; progressing rows and ext to HEP with Tband. Decr postural cues. Some subjective improvement reported.   -PM        PT Plan    PT Frequency  2x/week  -PM     Predicted Duration of Therapy Intervention (Therapy Eval)  3 weeks  -PM     PT Plan Comments  CS isometrics; maxine nxt wk  -PM       User Key  (r) = Recorded By, (t) = Taken By, (c) = Cosigned By    Initials Name Provider Type    Hillary Sullivan PTA  "Physical Therapy Assistant          Modalities     Row Name 02/12/19 1600             Ice    Patient denies application of Ice  Yes  -PM        User Key  (r) = Recorded By, (t) = Taken By, (c) = Cosigned By    Initials Name Provider Type    PM Hillary Haro PTA Physical Therapy Assistant          Exercises     Row Name 02/12/19 1600             Precautions    Existing Precautions/Restrictions  no known precautions/restrictions  -PM         Subjective Comments    Subjective Comments  Pt states that traction helps for a few days and then symptoms start to return.  -PM         Subjective Pain    Able to rate subjective pain?  yes  -PM      Pre-Treatment Pain Level  3  -PM      Post-Treatment Pain Level  4  -PM         Aquatics    Aquatics performed?  No  -PM         Exercise 1    Exercise Name 1  PRO ll UE  -PM      Time 1  8  -PM      Additional Comments  L3, F/R  -PM         Exercise 2    Exercise Name 2  UT S  -PM      Cueing 2  Verbal  -PM      Reps 2  2  -PM      Time 2  30 sec hold  -PM         Exercise 3    Exercise Name 3  Levator S  -PM      Cueing 3  Verbal  -PM      Reps 3  2  -PM      Time 3  30 sec hold  -PM         Exercise 4    Exercise Name 4  scalene S  -PM      Cueing 4  Verbal  -PM      Sets 4  --  -PM      Reps 4  2  -PM      Time 4  30\"  -PM         Exercise 5    Exercise Name 5  PN No $ Ret w/ER posture and N gliding  -PM      Cueing 5  Verbal  -PM      Sets 5  1  -PM      Reps 5  10  -PM      Additional Comments  red TB  -PM         Exercise 6    Exercise Name 6  Rows and B shldr ext for HEP  -PM      Cueing 6  Verbal  -PM      Sets 6  2  -PM      Reps 6  10  -PM      Additional Comments  green TB  -PM         Exercise 7    Exercise Name 7  Std wall/pillow chin tucks  -PM      Cueing 7  Verbal;Tactile  -PM      Sets 7  1  -PM      Reps 7  15  -PM      Time 7  5\" hold  -PM         Exercise 8    Exercise Name 8  Manual  -PM      Time 8  8'  -PM         Exercise 9    Exercise Name 9  Cervical " mechanical traction  -PM      Time 9  15'  -PM         Exercise 10    Exercise Name 10  prone over pillow and util CS wedge YTI  -PM      Cueing 10  Verbal;Tactile  -PM      Sets 10  1  -PM      Reps 10  15  -PM        User Key  (r) = Recorded By, (t) = Taken By, (c) = Cosigned By    Initials Name Provider Type    Hillary Sullivan PTA Physical Therapy Assistant                        Manual Rx (last 36 hours)      Manual Treatments     Row Name 02/12/19 1600             Manual Rx 1    Manual Rx 1 Location  Cervical spine prone over pillow and CS wedge  -PM      Manual Rx 1 Type  STM/MFR UT/LS, rhomboids/mid traps; CS paraspinals, lat glides  -PM      Manual Rx 1 Duration  10 min  -PM        User Key  (r) = Recorded By, (t) = Taken By, (c) = Cosigned By    Initials Name Provider Type    Hillary Sullivan PTA Physical Therapy Assistant          PT OP Goals     Row Name 02/12/19 1600          PT Short Term Goals    STG Date to Achieve  02/21/19  -PM     STG 1  Pt independent with HEP  -PM     STG 1 Progress  Met;Ongoing  -PM     STG 2  Improve cervical AROM to % (all planes)  -PM     STG 2 Progress  Partially Met;Ongoing  -PM     STG 3  Reduce neck pain by 75% performing daily tasks  -PM     STG 3 Progress  Ongoing  -PM     STG 4  Improve R UE light touch sensation (all dermatomes C4-T1) to WFL  -PM     STG 4 Progress  Ongoing  -PM        Time Calculation    PT Goal Re-Cert Due Date  02/21/19  -PM       User Key  (r) = Recorded By, (t) = Taken By, (c) = Cosigned By    Initials Name Provider Type    PM Hillary Haro PTA Physical Therapy Assistant          Therapy Education  Education Details: rows, ext with Tband  Given: HEP, Symptoms/condition management, Pain management, Posture/body mechanics  Program: Reinforced, Progressed  How Provided: Verbal, Demonstration  Provided to: Patient  Level of Understanding: Teach back education performed, Verbalized, Demonstrated              Time  Calculation:   Start Time: 1605  Stop Time: 1715  Time Calculation (min): 70 min  Total Timed Code Minutes- PT: 55 minute(s)  Therapy Suggested Charges     Code   Minutes Charges    None           Therapy Charges for Today     Code Description Service Date Service Provider Modifiers Qty    02399563323 HC PT TRACTION CERVICAL 2/12/2019 Hillary Haro, PTA GP 1    05510111910 HC PT MANUAL THERAPY EA 15 MIN 2/12/2019 Hillary Haro PTA GP 1    35258267753 HC PT THER PROC EA 15 MIN 2/12/2019 Hillary Haro PTA GP 3     NM DME SUPPLY OR ACCESSORY, NOS 2/12/2019 Hillary Haro, FRANCISCA  1                    Hillary Haro PTA  2/12/2019

## 2019-02-15 ENCOUNTER — HOSPITAL ENCOUNTER (OUTPATIENT)
Dept: PHYSICAL THERAPY | Facility: HOSPITAL | Age: 34
Setting detail: THERAPIES SERIES
Discharge: HOME | End: 2019-02-15

## 2019-02-15 DIAGNOSIS — G47.33 OBSTRUCTIVE SLEEP APNEA, ADULT: Primary | ICD-10-CM

## 2019-02-15 DIAGNOSIS — M54.2 CERVICALGIA: Primary | ICD-10-CM

## 2019-02-15 PROCEDURE — 97012 MECHANICAL TRACTION THERAPY: CPT

## 2019-02-15 PROCEDURE — 97110 THERAPEUTIC EXERCISES: CPT

## 2019-02-15 PROCEDURE — 97140 MANUAL THERAPY 1/> REGIONS: CPT

## 2019-02-15 NOTE — PROGRESS NOTES
Outpatient Physical Therapy Ortho Treatment Note   [facility]     Patient Name: Jayce Gonzalez III  : 1985  MRN: [id number]  Today's Date: 2/15/2019      Visit Date: 02/15/2019    Subjective Improvement:     30% radicular; CS ROM 60%  Attendance:    Approved:      7 visits(28 units); 6 addt'l visits (18 units)     MD follow up:     ROBBY       date:     19    Visit Dx:    ICD-10-CM ICD-9-CM   1. Cervicalgia M54.2 723.1       There is no problem list on file for this patient.       Past Medical History:   Diagnosis Date   • GERD (gastroesophageal reflux disease)         Past Surgical History:   Procedure Laterality Date   • EYE SURGERY     • KNEE ARTHROSCOPY Right        PT Ortho     Row Name 02/15/19 0800       Subjective Pain    Post-Treatment Pain Level  3  -TM    Row Name 19 1600       Subjective Comments    Subjective Comments  Pt states that traction helps for a few days and then symptoms start to return.  -PM       Precautions and Contraindications    Precautions/Limitations  no known precautions/limitations  -PM       Subjective Pain    Able to rate subjective pain?  yes  -PM    Pre-Treatment Pain Level  3  -PM    Post-Treatment Pain Level  4  -PM       Posture/Observations    Posture/Observations Comments  intermittent cues posture  -PM      User Key  (r) = Recorded By, (t) = Taken By, (c) = Cosigned By    Initials Name Provider Type    PM Hillary Haro, FRANCISCA Physical Therapy Assistant    TM Lexii Quesada PTA Physical Therapy Assistant                      PT Assessment/Plan     Row Name 02/15/19 0800          PT Assessment    Assessment Comments  Pt did well with all therex.  Increased wt by 5# on traction.  Has trigger points along R vertebral border of scapula.    -TM     Patient/caregiver participated in establishment of treatment plan and goals  Yes  -TM        PT Plan    PT Frequency  2x/week  -TM     Predicted Duration of Therapy Intervention (Therapy  "Eval)  3 weeks  -TM     PT Plan Comments  PT recheck next week.   -TM       User Key  (r) = Recorded By, (t) = Taken By, (c) = Cosigned By    Initials Name Provider Type    TM Lexii Quesada PTA Physical Therapy Assistant          Modalities     Row Name 02/15/19 0800             Traction 35771    Traction Type  Cervical  -TM      Rx Minutes  12  -TM      Position  Hook-lying  -TM      Weight  23  -TM      Hold  60  -TM      Relax  10  -TM        User Key  (r) = Recorded By, (t) = Taken By, (c) = Cosigned By    Initials Name Provider Type    TM Lexii Quesada PTA Physical Therapy Assistant          Exercises     Row Name 02/15/19 0800             Precautions    Existing Precautions/Restrictions  no known precautions/restrictions  -TM         Subjective Comments    Subjective Comments  Pt reports pain R side of neck & down to shoulder.  -TM         Subjective Pain    Able to rate subjective pain?  yes  -TM      Pre-Treatment Pain Level  3  -TM      Post-Treatment Pain Level  3  -TM         Exercise 1    Exercise Name 1  PRO ll UE  -TM      Time 1  8 min  -TM      Additional Comments  L 3  -TM         Exercise 2    Exercise Name 2  UT S  -TM      Reps 2  2  -TM      Time 2  30\"  -TM         Exercise 3    Exercise Name 3  Levator S  -TM      Reps 3  2  -TM      Time 3  30\"  -TM         Exercise 4    Exercise Name 4  scalene S  -TM      Reps 4  2  -TM      Time 4  330\"  -TM         Exercise 5    Exercise Name 5  T Band no money  -TM      Sets 5  2  -TM      Reps 5  10  -TM      Additional Comments  red  -TM         Exercise 6    Exercise Name 6  T Band rows, ext  -TM      Sets 6  2  -TM      Reps 6  10  -TM      Additional Comments  green  -TM         Exercise 7    Exercise Name 7  CS isometric flex, SB  -TM      Reps 7  10  -TM      Time 7  5\" hold  -TM         Exercise 8    Exercise Name 8  standing wall chin tucks  -TM      Reps 8  15  -TM      Time 8  5\" hold  -TM         Exercise 9    Exercise Name 9  " prone over pillow  & CS wedge YTI  -TM      Reps 9  15  -TM         Exercise 10    Exercise Name 10  manual- see flowsheet  -TM      Time 10  8 min  -TM        User Key  (r) = Recorded By, (t) = Taken By, (c) = Cosigned By    Initials Name Provider Type    TM Lexii Quesada PTA Physical Therapy Assistant                        Manual Rx (last 36 hours)      Manual Treatments     Row Name 02/15/19 0700             Manual Rx 1    Manual Rx 1 Location  Cervical spine prone over pillow with CS wedge  -TM      Manual Rx 1 Type  STM/MFR UT/LS, rhomboids/mid traps; CS paraspinals, lat glides  -TM      Manual Rx 1 Duration  8 min  -TM        User Key  (r) = Recorded By, (t) = Taken By, (c) = Cosigned By    Initials Name Provider Type    TM Lexii Quesada PTA Physical Therapy Assistant          PT OP Goals     Row Name 02/15/19 0800          PT Short Term Goals    STG Date to Achieve  02/21/19  -TM     STG 1  Pt independent with HEP  -TM     STG 1 Progress  Met;Ongoing  -TM     STG 2  Improve cervical AROM to % (all planes)  -TM     STG 2 Progress  Partially Met;Ongoing  -TM     STG 3  Reduce neck pain by 75% performing daily tasks  -TM     STG 3 Progress  Ongoing  -TM     STG 4  Improve R UE light touch sensation (all dermatomes C4-T1) to WFL  -TM     STG 4 Progress  Ongoing  -TM       User Key  (r) = Recorded By, (t) = Taken By, (c) = Cosigned By    Initials Name Provider Type    TM Leixi Quesada PTA Physical Therapy Assistant                         Time Calculation:   Start Time: 0800  Stop Time: 0900  Time Calculation (min): 60 min  Total Timed Code Minutes- PT: 48 minute(s)  Therapy Suggested Charges     Code   Minutes Charges    None           Therapy Charges for Today     Code Description Service Date Service Provider Modifiers Qty    27690206117 HC PT MANUAL THERAPY EA 15 MIN 2/15/2019 Lexii Quesada PTA GP 1    41783773411 HC PT TRACTION CERVICAL 2/15/2019 Lexii Quesada PTA GP  1    [id number]  PT THER PROC EA 15 MIN 2/15/2019 Lexii Quesada, PTA GP 1                    Lexii Quesada, PTA  2/15/2019

## 2019-02-20 ENCOUNTER — HOSPITAL ENCOUNTER (OUTPATIENT)
Dept: PHYSICAL THERAPY | Facility: HOSPITAL | Age: 34
Setting detail: THERAPIES SERIES
Discharge: HOME | End: 2019-02-20

## 2019-02-20 DIAGNOSIS — M54.2 CERVICALGIA: Primary | ICD-10-CM

## 2019-02-20 PROCEDURE — 97110 THERAPEUTIC EXERCISES: CPT | Performed by: PHYSICAL THERAPIST

## 2019-02-20 PROCEDURE — 97140 MANUAL THERAPY 1/> REGIONS: CPT | Performed by: PHYSICAL THERAPIST

## 2019-02-20 NOTE — THERAPY PROGRESS REPORT/RE-CERT
Outpatient Physical Therapy Ortho Progress Note  [facility], [place], operated by [facility]     Patient Name: Jayce Gonzalez III  : 1985  MRN: [id number]  Today's Date: 2019      Visit Date: 2019     Subjective Improvement:     30% radicular; CS ROM 60%  Attendance:    Approved:      7 visits(28 units); 6 addt'l visits (18 units)     MD follow up:     TBD       date:     3/13/19        There is no problem list on file for this patient.       Past Medical History:   Diagnosis Date   • GERD (gastroesophageal reflux disease)         Past Surgical History:   Procedure Laterality Date   • EYE SURGERY     • KNEE ARTHROSCOPY Right        Visit Dx:     ICD-10-CM ICD-9-CM   1. Cervicalgia M54.2 723.1           PT Ortho     Row Name 19 0900       Head/Neck/Trunk    Neck Extension AROM  60  -KG    Neck Flexion AROM  55  -KG    Neck Lt Lateral Flexion AROM  40  -KG    Neck Rt Lateral Flexion AROM  45  -KG    Neck Lt Rotation AROM  75  -KG    Neck Rt Rotation AROM  75  -KG        Strength Right    # Reps  3  -KG    Right Rung  2  -KG    Right  Test 1  65  -KG    Right  Test 2  50  -KG    Right  Test 3  45  -KG     Strength Average Right  53.33  -KG        Strength Left    # Reps  3  -KG    Left Rung  2  -KG    Left  Test 1  110  -KG    Left  Test 2  85  -KG    Left  Test 3  80  -KG     Strength Average Left  91.67  -KG       Sensation    Sensation WNL?  WFL  -KG    Light Touch  Partial deficits in the RUE  -KG       Hand  Strength     Strength Affected Side  Bilateral  -KG    Row Name 19 0800       Subjective Pain    Able to rate subjective pain?  yes  -KG      User Key  (r) = Recorded By, (t) = Taken By, (c) = Cosigned By    Initials Name Provider Type    KG Nupur Orr, PT Physical Therapist        Posture/observations comments: No acute distress. Intermittent cuing required for posture throughout treatment session. Rounded shoulders  posture    Sensory screen for upper quarter  C4: Bilateral:;Intact  C5: Right:;Diminished;Left:;Intact  C6: Bilateral:;Intact  C7: Bilateral:;Intact  C8:Right:;Diminished;Left:;Intact    Myotomal Screen upper quarter  Shoulder flex C5: Right:;4+ (Good +);Left:;5 (Normal)  Elbow flex/wrist ext C6: Bilateral:;5 (Normal)  Elbow ext/wrist flex C7: Bilateral:;5 (Normal)  Finger flex/ C8: Bilateral:;5 (Normal)  Finger abd T1: Bilateral:;5 (Normal)    TTP middle & upper traps bilateral; L vertebral border of scap        Therapy Education  Given: HEP, Symptoms/condition management, Pain management, Posture/body mechanics  Program: Reinforced  How Provided: Verbal  Provided to: Patient  Level of Understanding: Verbalized, Demonstrated     PT OP Goals     Row Name 02/20/19 0800          PT Short Term Goals    STG Date to Achieve  02/21/19  -KG     STG 1  Pt independent with HEP  -KG     STG 1 Progress  Met;Ongoing  -KG     STG 2  Improve cervical AROM to % (all planes)  -KG     STG 2 Progress  Partially Met;Ongoing  -KG     STG 3  Reduce neck pain by 75% performing daily tasks  -KG     STG 3 Progress  Ongoing  -KG     STG 4  Improve R UE light touch sensation (all dermatomes C4-T1) to WFL  -KG     STG 4 Progress  Ongoing  -KG        Time Calculation    PT Goal Re-Cert Due Date  03/13/19  -KG       User Key  (r) = Recorded By, (t) = Taken By, (c) = Cosigned By    Initials Name Provider Type    KG Nupur Orr, PT Physical Therapist           PT Assessment/Plan     Row Name 02/20/19 0800          PT Assessment    Functional Limitations  Performance in work activities;Performance in sport activities;Performance in self-care ADL;Performance in leisure activities  -KG     Impairments  Sensation;Range of motion;Posture;Pain;Muscle strength  -KG     Assessment Comments  Pt continues to progress slowly with PT. Good improvements noted in overall cervical ROM but continues to have numbness in hand and radicular  symptoms in shoulder at times. Pt states traction helps for the short term but hasn't been long lasting. Pt continues to require postural cuing throughout therex but has improved overall. Pt reports he does not know how much more therapy will help. Will see pt for 1-2 more weeks and progress towards independent management.  -KG     Rehab Potential  Good  -KG     Patient/caregiver participated in establishment of treatment plan and goals  Yes  -KG     Patient would benefit from skilled therapy intervention  Yes  -KG        PT Plan    PT Frequency  2x/week  -KG     Predicted Duration of Therapy Intervention (Therapy Eval)  1-2 more weeks  -KG     PT Plan Comments  Will ask for additional visits next visit. Will plan to d/c in 1-2 weeks. Continue traction, manual, cervical & scap stability activities.  -KG       User Key  (r) = Recorded By, (t) = Taken By, (c) = Cosigned By    Initials Name Provider Type    Nupur Raman, PT Physical Therapist       Modalities     Row Name 02/20/19 0800             Traction 46398    Traction Type  Cervical  -KG      Rx Minutes  12  -KG      Position  Hook-lying  -KG      Weight  23  -KG      Hold  60  -KG      Relax  10  -KG        User Key  (r) = Recorded By, (t) = Taken By, (c) = Cosigned By    Initials Name Provider Type    Nupur Raman, PT Physical Therapist         Exercises     Row Name 02/20/19 0800             Precautions    Existing Precautions/Restrictions  no known precautions/restrictions  -KG         Subjective Comments    Subjective Comments  Pt states he's had a little more pain in his R neck & shoulder blade over the past couple of days. Reports 60% improvement at this time. States he'd be 100% if the pain would go away. States he does start his injections tomorrow.  -KG         Subjective Pain    Able to rate subjective pain?  yes  -KG      Pre-Treatment Pain Level  5  -KG         Aquatics    Aquatics performed?  No  -KG         Exercise 1    Exercise  "Name 1  PRO ll UE  -KG      Time 1  8 min  -KG      Additional Comments  L 3.5  -KG         Exercise 2    Exercise Name 2  UT S  -KG      Cueing 2  Verbal  -KG      Reps 2  2  -KG      Time 2  30\"  -KG         Exercise 3    Exercise Name 3  Levator S  -KG      Cueing 3  Verbal  -KG      Reps 3  2  -KG      Time 3  30\"  -KG         Exercise 4    Exercise Name 4  scalene S  -KG      Reps 4  2  -KG      Time 4  30\"  -KG         Exercise 5    Exercise Name 5  Std wall/pillow chin tucks  -KG      Cueing 5  Verbal  -KG      Sets 5  1  -KG      Reps 5  15  -KG      Time 5  5\" hold  -KG         Exercise 6    Exercise Name 6  Cervical isometrics/ball on wall; posture re-ed - B SB, flex  -KG      Cueing 6  Verbal  -KG      Sets 6  1  -KG      Reps 6  10  -KG      Time 6  5\" hold  -KG        User Key  (r) = Recorded By, (t) = Taken By, (c) = Cosigned By    Initials Name Provider Type    Nupur Raman, PT Physical Therapist              Manual Rx (last 36 hours)      Manual Treatments     Row Name 02/20/19 0800             Manual Rx 1    Manual Rx 1 Location  Cervical spine prone over pillow with CS wedge  -KG      Manual Rx 1 Type  STM/MFR UT/LS, rhomboids/mid traps; CS paraspinals, lat glides  -KG      Manual Rx 1 Duration  8 min  -KG        User Key  (r) = Recorded By, (t) = Taken By, (c) = Cosigned By    Initials Name Provider Type    Nupur Raman, PT Physical Therapist                  Outcome Measure Options: Neck Disability Index (NDI)  Neck Disability Index  Section 1 - Pain Intensity: The pain is fairly severe at the moment.  Section 2 - Personal Care: I can look after myself normally, but it causes extra pain.  Section 3 - Lifting: Pain prevents me from lifting heavy weights, but I can manage light weights if they are conveniently positioned.  Section 4 - Work: I can hardly do any work at all.  Section 5 - Headaches: I have moderate headaches that come frequently  Section 6 - Concentration: I can " concentrate fully with slight difficulty.  Section 7 - Sleeping: My sleep is greatly disturbed for up to 3-5 hours.  Section 8 - Driving: I can drive as long as I want with moderate neck pain.  Section 9 - Reading: I can't read as much as I want because of moderate neck pain.  Section 10 - Recreation: I can hardly do recreational activities due to neck pain.  Neck Disability Index Score: 28  Neck Disability Index Comments: 56%      Time Calculation:     Therapy Suggested Charges     Code   Minutes Charges    None             Start Time: 0845  Stop Time: 0940  Time Calculation (min): 55 min  Total Timed Code Minutes- PT: 40 minute(s)     Therapy Charges for Today     Code Description Service Date Service Provider Modifiers Qty    05353553353 HC PT THER PROC EA 15 MIN 2/20/2019 Nupur Orr, PT GP 1    39943650416 HC PT MANUAL THERAPY EA 15 MIN 2/20/2019 Nupur Orr, PT GP 1    89391396164 HC PT THER SUPP EA 15 MIN 2/20/2019 Nupur Orr, PT GP 2          PT G-Codes  Outcome Measure Options: Neck Disability Index (NDI)  Neck Disability Index Score: 28         Nupur Orr, PT  2/20/2019

## 2019-02-22 ENCOUNTER — HOSPITAL ENCOUNTER (OUTPATIENT)
Dept: PHYSICAL THERAPY | Facility: HOSPITAL | Age: 34
Setting detail: THERAPIES SERIES
Discharge: HOME | End: 2019-02-22

## 2019-02-22 DIAGNOSIS — M54.2 CERVICALGIA: Primary | ICD-10-CM

## 2019-02-22 PROCEDURE — 97110 THERAPEUTIC EXERCISES: CPT | Performed by: PHYSICAL THERAPIST

## 2019-02-22 PROCEDURE — 97140 MANUAL THERAPY 1/> REGIONS: CPT | Performed by: PHYSICAL THERAPIST

## 2019-02-22 NOTE — THERAPY TREATMENT NOTE
Outpatient Physical Therapy Ortho Treatment Note  [facility]     Patient Name: Jayce Gonzalez III  : 1985  MRN: [id number]  Today's Date: 2019      Visit Date: 2019     Subjective Improvement:     30% radicular; CS ROM 60%  Attendance:   Approved:  Approved:      7 visits(28 units); 6 addt'l visits (18 units)         MD follow up:       ROBBY     date:   3/13/19        Visit Dx:    ICD-10-CM ICD-9-CM   1. Cervicalgia M54.2 723.1       There is no problem list on file for this patient.       Past Medical History:   Diagnosis Date   • GERD (gastroesophageal reflux disease)         Past Surgical History:   Procedure Laterality Date   • EYE SURGERY     • KNEE ARTHROSCOPY Right        PT Ortho     Row Name 19 0900       Subjective Comments    Subjective Comments  Pt states he had his injections yesterday. States he's a little sore in his shoulder blade area from it. Neck not hurting bad today.  -KG       Precautions and Contraindications    Precautions/Limitations  no known precautions/limitations  -KG       Subjective Pain    Pre-Treatment Pain Level  4 4/10 shoulder blade area; 1/10 neck  -KG    Post-Treatment Pain Level  2  -KG       Posture/Observations    Posture/Observations Comments  No acute distress. Intermittent cuing required for posture throughout treatment session.  -KG    Row Name 19 0900       Head/Neck/Trunk    Neck Extension AROM  60  -KG    Neck Flexion AROM  55  -KG    Neck Lt Lateral Flexion AROM  40  -KG    Neck Rt Lateral Flexion AROM  45  -KG    Neck Lt Rotation AROM  75  -KG    Neck Rt Rotation AROM  75  -KG        Strength Right    # Reps  3  -KG    Right Rung  2  -KG    Right  Test 1  65  -KG    Right  Test 2  50  -KG    Right  Test 3  45  -KG     Strength Average Right  53.33  -KG        Strength Left    # Reps  3  -KG    Left Rung  2  -KG    Left  Test 1  110  -KG    Left  Test 2  85  -KG    Left  Test 3   80  -KG     Strength Average Left  91.67  -KG       Sensation    Sensation WNL?  WFL  -KG    Light Touch  Partial deficits in the RUE  -KG       Hand  Strength     Strength Affected Side  Bilateral  -KG    Row Name 02/20/19 0800       Subjective Pain    Able to rate subjective pain?  yes  -KG      User Key  (r) = Recorded By, (t) = Taken By, (c) = Cosigned By    Initials Name Provider Type    KG Nupur Orr, PT Physical Therapist                      PT Assessment/Plan     Row Name 02/22/19 0900          PT Assessment    Functional Limitations  Performance in work activities;Performance in sport activities;Performance in self-care ADL;Performance in leisure activities  -KG     Impairments  Sensation;Range of motion;Posture;Pain;Muscle strength  -KG     Assessment Comments  Pt more sore this date at interscap muscles bilaterally. Less STR noted in this area as well as in cervical spine. Challenged with YTI prone pball as he fatigues easily. Mild postural cuing required throughout.  -KG     Rehab Potential  Good  -KG     Patient/caregiver participated in establishment of treatment plan and goals  Yes  -KG     Patient would benefit from skilled therapy intervention  Yes  -KG        PT Plan    PT Frequency  2x/week  -KG     Predicted Duration of Therapy Intervention (Therapy Eval)  1-2 more weeks  -KG     PT Plan Comments  Will attempt to get a few more visits to finalize HEP and see about getting a home traction unit through insurance.  -KG       User Key  (r) = Recorded By, (t) = Taken By, (c) = Cosigned By    Initials Name Provider Type    KG Nupur Orr, PT Physical Therapist          Modalities     Row Name 02/22/19 0900             Traction 76908    Traction Type  Cervical  -KG      Rx Minutes  12  -KG      Position  Hook-lying  -KG      Weight  23  -KG      Hold  60  -KG      Relax  10  -KG        User Key  (r) = Recorded By, (t) = Taken By, (c) = Cosigned By    Initials Name Provider Type  "   ISATU Nupur Orr, PT Physical Therapist          Exercises     Row Name 02/22/19 0900             Precautions    Existing Precautions/Restrictions  no known precautions/restrictions  -KG         Subjective Comments    Subjective Comments  Pt states he had his injections yesterday. States he's a little sore in his shoulder blade area from it. Neck not hurting bad today.  -KG         Subjective Pain    Able to rate subjective pain?  yes  -KG      Pre-Treatment Pain Level  4 4/10 shoulder blade area; 1/10 neck  -KG      Post-Treatment Pain Level  2  -KG         Aquatics    Aquatics performed?  No  -KG         Exercise 1    Exercise Name 1  PRO ll UE  -KG      Time 1  8 min  -KG      Additional Comments  L 3.5  -KG         Exercise 2    Exercise Name 2  UT S  -KG      Cueing 2  Verbal  -KG      Reps 2  2  -KG      Time 2  30\"  -KG         Exercise 3    Exercise Name 3  Levator S  -KG      Cueing 3  Verbal  -KG      Reps 3  2  -KG      Time 3  30\"  -KG         Exercise 4    Exercise Name 4  scalene S  -KG      Reps 4  2  -KG      Time 4  30\"  -KG         Exercise 5    Exercise Name 5  Prone on pball YTI  -KG      Cueing 5  Verbal  -KG      Sets 5  1  -KG      Reps 5  15  -KG         Exercise 6    Exercise Name 6  Prone on pball rows  -KG      Cueing 6  Verbal  -KG      Sets 6  1  -KG      Reps 6  10  -KG         Exercise 7    Exercise Name 7  Wall push ups  -KG      Cueing 7  Verbal  -KG      Sets 7  1  -KG      Reps 7  10  -KG         Exercise 8    Exercise Name 8  Cervical isometrics/ball on wall; posture re-ed - B SB, flex  -KG      Cueing 8  Verbal  -KG      Sets 8  1  -KG      Reps 8  10  -KG      Time 8  5\" hold  -KG        User Key  (r) = Recorded By, (t) = Taken By, (c) = Cosigned By    Initials Name Provider Type    ISATU Nupur Orr, PT Physical Therapist                        Manual Rx (last 36 hours)      Manual Treatments     Row Name 02/22/19 0900             Manual Rx 1    Manual Rx 1 Location  " Cervical spine prone over pillow with CS wedge  -KG      Manual Rx 1 Type  STM/MFR UT/LS, rhomboids/mid traps; CS paraspinals, lat glides  -KG      Manual Rx 1 Duration  8 min  -KG        User Key  (r) = Recorded By, (t) = Taken By, (c) = Cosigned By    Initials Name Provider Type    Nupur Raman, PT Physical Therapist          PT OP Goals     Row Name 02/22/19 0900          PT Short Term Goals    STG Date to Achieve  02/21/19  -KG     STG 1  Pt independent with HEP  -KG     STG 1 Progress  Met;Ongoing  -KG     STG 2  Improve cervical AROM to % (all planes)  -KG     STG 2 Progress  Partially Met;Ongoing  -KG     STG 3  Reduce neck pain by 75% performing daily tasks  -KG     STG 3 Progress  Ongoing  -KG     STG 4  Improve R UE light touch sensation (all dermatomes C4-T1) to WFL  -KG     STG 4 Progress  Ongoing  -KG        Time Calculation    PT Goal Re-Cert Due Date  03/13/19  -KG       User Key  (r) = Recorded By, (t) = Taken By, (c) = Cosigned By    Initials Name Provider Type    Nupur Raman, PT Physical Therapist          Therapy Education  Given: HEP, Symptoms/condition management, Pain management, Posture/body mechanics  Program: Reinforced  How Provided: Verbal  Provided to: Patient  Level of Understanding: Verbalized, Demonstrated              Time Calculation:   Start Time: 0937  Stop Time: 1032  Time Calculation (min): 55 min  Therapy Suggested Charges     Code   Minutes Charges    None           Therapy Charges for Today     Code Description Service Date Service Provider Modifiers Qty    83829793069 HC PT THER PROC EA 15 MIN 2/22/2019 Nupur Orr, PT GP 1    06835508849 HC PT MANUAL THERAPY EA 15 MIN 2/22/2019 Nupur Orr, PT GP 1    94611642293 HC PT THER SUPP EA 15 MIN 2/22/2019 Nupur Orr, PT GP 2                    Nupur Orr PT  2/22/2019

## 2019-05-10 ENCOUNTER — OFFICE VISIT (OUTPATIENT)
Dept: SLEEP MEDICINE | Facility: HOSPITAL | Age: 34
End: 2019-05-10

## 2019-05-10 VITALS
DIASTOLIC BLOOD PRESSURE: 76 MMHG | WEIGHT: 238 LBS | SYSTOLIC BLOOD PRESSURE: 112 MMHG | HEART RATE: 66 BPM | BODY MASS INDEX: 33.32 KG/M2 | HEIGHT: 71 IN | OXYGEN SATURATION: 95 %

## 2019-05-10 DIAGNOSIS — G47.33 OBSTRUCTIVE SLEEP APNEA, ADULT: Primary | ICD-10-CM

## 2019-05-10 DIAGNOSIS — G25.81 RESTLESS LEGS SYNDROME (RLS): ICD-10-CM

## 2019-05-10 PROBLEM — M54.2 NECK PAIN: Status: ACTIVE | Noted: 2019-05-10

## 2019-05-10 PROBLEM — J45.909 ASTHMA: Status: ACTIVE | Noted: 2019-05-10

## 2019-05-10 PROCEDURE — 99213 OFFICE O/P EST LOW 20 MIN: CPT | Performed by: NURSE PRACTITIONER

## 2019-05-10 NOTE — PROGRESS NOTES
Sleep Clinic Follow Up    Date: 5/10/2019  Primary Care Physician: Ruby Feliciano APRN    Last office visit: 2019 (I reviewed this note)    CC: Follow up: PRASANTH started on CPAP      Interim History:  Since the last visit:    1) mild PRASANTH -  Jayce A [place] III has remained compliant with CPAP. He denies mask and machine issues, dry mouth, headaches, or pressures intolerance. He denies abnormal dreams, sleep paralysis, nasal congestion, URI sx.    Sleep Testin. HST on 2019, AHI of 9.9   2. CPAP titration on same day, recommended 5-20 cm H2O   3. Currently on 5-20 cm H2O    PAP Data:    Time frame: 2019-2019   Compliance 100 %  Average use on days used: 8 hrs 45 min  Percent of days with usage greater than or equal to 4 hours: 100%  PAP range : 5-20 cm H2O  Average 90% pressure: 7.7 cmH2O  Leak: 55 seconds  Average AHI 4.6 events/hr  Mask type: Nasal mask cushion  DME: [facility]    Bed time:   Sleep latency: 10-20 minutes  Number of times awakens during the night: 5  Wake time: 5935-4388  Estimated total sleep time at night: 5-6 hours  Caffeine intake: 0 cups of coffee, 0 cups of tea, and 0 oz of soda  Alcohol intake: 0 drinks per week  Nap time: rarely, less than before, usually after eating  Sleepiness with Driving: none       2) Patient reports some RLS symptoms - kicking and stretching legs at night, burning and pain in calves and feet.    PMHx, FH, SH reviewed and pertinent changes are: Gabapentin increased to 900 mg PO QHS      REVIEW OF SYSTEMS:   (+) neck and shoulder pain. Negative for chest pain, fever, cough, SOA, abdominal pain. Smoking:none        Exam:  Vitals:    05/10/19 1044   BP: 112/76   Pulse: 66   SpO2: 95%           05/10/19  1044   Weight: 108 kg (238 lb)     Body mass index is 33.21 kg/m². Patient's Body mass index is 33.21 kg/m². BMI is above normal parameters. Recommendations include: referral to primary care.      Gen:                No distress, conversant,  pleasant, appears stated age, alert, oriented  Eyes:               Anicteric sclera, moist conjunctiva, no lid lag                           PERRL, EOMI   Heent:             NC/AT                          Oropharynx clear                          Normal hearing  Lungs:             Normal effort, non-labored breathing                          Clear to auscultation bilaterally          CV:                  Normal S1/S2, no murmur                          No lower extremity edema  ABD:               Soft, rounded, non-distended                          Normal bowel sounds                    Psych:             Appropriate affect  Neuro:             CN 2-12 appear intact    Past Medical History:   Diagnosis Date   • GERD (gastroesophageal reflux disease)        Current Outpatient Medications:   •  albuterol sulfate  (90 Base) MCG/ACT inhaler, Inhale., Disp: , Rfl:   •  gabapentin (NEURONTIN) 300 MG capsule, Take 300 mg by mouth., Disp: , Rfl:   •  raNITIdine (ZANTAC) 150 MG tablet, , Disp: , Rfl:       Assessment and Plan:    1. Obstructive sleep apnea Established, stable (1)  1. Compliant with PAP therapy  2. Continue PAP as prescribed  3. Script for PAP supplies        2. Restless leg syndrome   1.   Will order ferritin.   2.   Continue gabapentin per PCP      Total time 15 min, more than half spent in face to face counseling and coordination of care.    RTC in 12 months        This document has been electronically signed by WHITNEY Mares on May 10, 2019 10:52 AM                      CC: Ruby Fleiciano APRN          No ref. provider found

## 2019-05-13 ENCOUNTER — DOCUMENTATION (OUTPATIENT)
Dept: PHYSICAL THERAPY | Facility: HOSPITAL | Age: 34
End: 2019-05-13

## 2019-05-13 DIAGNOSIS — M54.2 CERVICALGIA: Primary | ICD-10-CM

## 2019-05-13 NOTE — THERAPY DISCHARGE NOTE
Outpatient Physical Therapy Discharge Summary         Patient Name: Jayce Gonzalez III  : 1985  MRN: [id number]    Today's Date: 2019    Visit Dx:    ICD-10-CM ICD-9-CM   1. Cervicalgia M54.2 723.1       PT OP Goals     Row Name 19 1200          PT Short Term Goals    STG Date to Achieve  19  -KG     STG 1  Pt independent with HEP  -KG     STG 1 Progress  Met  -KG     STG 2  Improve cervical AROM to % (all planes)  -KG     STG 2 Progress  Partially Met  -KG     STG 3  Reduce neck pain by 75% performing daily tasks  -KG     STG 3 Progress  Partially Met  -KG     STG 4  Improve R UE light touch sensation (all dermatomes C4-T1) to WFL  -KG     STG 4 Progress  Partially Met  -KG       User Key  (r) = Recorded By, (t) = Taken By, (c) = Cosigned By    Initials Name Provider Type    Nupur Raman, PT Physical Therapist          OP PT Discharge Summary  Date of Discharge: 19  Reason for Discharge: other (comment)(Pt did not return to PT after appt on 19. Attended  scheduled therapy sessions.)  Outcomes Achieved: Patient able to partially acheive established goals, Refer to plan of care for updates on goals achieved  Discharge Destination: Home with home program      Time Calculation:                    Nupur Orr, PT  2019

## 2019-07-31 DIAGNOSIS — G25.81 RESTLESS LEGS SYNDROME (RLS): Primary | ICD-10-CM

## 2020-02-05 ENCOUNTER — HOSPITAL ENCOUNTER (OUTPATIENT)
Dept: GENERAL RADIOLOGY | Facility: HOSPITAL | Age: 35
Discharge: HOME | End: 2020-02-05
Admitting: NURSE PRACTITIONER

## 2020-02-05 ENCOUNTER — OFFICE VISIT (OUTPATIENT)
Dept: NEUROSURGERY | Facility: CLINIC | Age: 35
End: 2020-02-05

## 2020-02-05 VITALS
DIASTOLIC BLOOD PRESSURE: 68 MMHG | WEIGHT: 217.4 LBS | BODY MASS INDEX: 30.44 KG/M2 | HEIGHT: 71 IN | SYSTOLIC BLOOD PRESSURE: 130 MMHG

## 2020-02-05 DIAGNOSIS — M54.2 CERVICALGIA: ICD-10-CM

## 2020-02-05 DIAGNOSIS — M54.12 CERVICAL RADICULOPATHY: ICD-10-CM

## 2020-02-05 DIAGNOSIS — Z78.9 NON-SMOKER: ICD-10-CM

## 2020-02-05 DIAGNOSIS — M54.2 CERVICALGIA: Primary | ICD-10-CM

## 2020-02-05 DIAGNOSIS — G56.21 CUBITAL TUNNEL SYNDROME ON RIGHT: ICD-10-CM

## 2020-02-05 PROCEDURE — 99214 OFFICE O/P EST MOD 30 MIN: CPT | Performed by: NURSE PRACTITIONER

## 2020-02-05 PROCEDURE — 72052 X-RAY EXAM NECK SPINE 6/>VWS: CPT

## 2020-02-05 RX ORDER — OMEPRAZOLE 20 MG/1
20 CAPSULE, DELAYED RELEASE ORAL DAILY
COMMUNITY
Start: 2020-01-09

## 2020-02-05 RX ORDER — MONTELUKAST SODIUM 10 MG/1
TABLET ORAL
COMMUNITY
End: 2020-07-02

## 2020-02-05 RX ORDER — CYCLOBENZAPRINE HCL 10 MG
TABLET ORAL
COMMUNITY
End: 2021-09-01 | Stop reason: ALTCHOICE

## 2020-02-05 NOTE — PROGRESS NOTES
Chief complaint:   Chief Complaint   Patient presents with   • Neck Pain     Jayce is referred here today for follow up for right arm pain with weakness, he states he also has neck pain, he has been dealing with this for a couple years, he bring an MRI report with him today but he states they did not give him the films this was done at the \[facility]\"", he also brings an EMG/NCV of RUE report with him that was done in 2018.  Tried pain management for a while but they were not helping him.        Subjective     HPI: This is a 34-year-old male gentleman who was referred to us by Dr. Oli Eduardo for neck and arm pain.  He is here to be evaluated today.  The patient states that the pain is been going on for about 2 years.  There is no accident or injury associated with this.  The pain in his neck is constant.  It is worse with using his arms and nothing makes it better.  He has pain that radiates from his neck all the way over into his hand in a radicular fashion.  He also has numbness and tingling in his left hand.  He says the pain in his arm is constant.  It has the same modifying factors.  Denies any bowel or bladder incontinence.  He has not done any recent physical therapy, chiropractic care.  He has tried pain management in the past without any significant improvement.  He currently is only taking Flexeril to help with his pain.  He is left-hand-dominant.  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  He is .  Denies any tobacco, alcohol, or illicit drug use.  Medical problems include acid reflux, allergies.  He states that he has had a work-up at the [facility] but does not feel like they were really giving him any significant relief with her treatment options    Review of Systems   HENT: Positive for facial swelling.    Eyes: Positive for pain and redness.   Respiratory: Positive for apnea.    Musculoskeletal: Positive for back pain, joint swelling, neck pain and neck stiffness.   Neurological:  "Positive for weakness, numbness and headaches.   Psychiatric/Behavioral: Positive for decreased concentration and sleep disturbance.   All other systems reviewed and are negative.       Past Medical History:   Diagnosis Date   • Arthritis    • GERD (gastroesophageal reflux disease)    • Hypertension      Past Surgical History:   Procedure Laterality Date   • EYE SURGERY     • FRACTURE SURGERY Right     Right arm    • KNEE ARTHROSCOPY Right    • TONSILLECTOMY AND ADENOIDECTOMY       Family History   Problem Relation Age of Onset   • Arthritis Mother      Social History     Tobacco Use   • Smoking status: Former Smoker   Substance Use Topics   • Alcohol use: No     Frequency: Never   • Drug use: Never       (Not in a hospital admission)  Allergies:  Mobic [meloxicam] and Nsaids    Objective      Vital Signs  /68 (BP Location: Right arm, Patient Position: Sitting)   Ht 180.3 cm (71\")   Wt 98.6 kg (217 lb 6.4 oz)   BMI 30.32 kg/m²     Physical Exam   Constitutional: He is oriented to person, place, and time. He appears well-developed and well-nourished.   HENT:   Head: Normocephalic.   Eyes: Pupils are equal, round, and reactive to light. Conjunctivae, EOM and lids are normal.   Neck: Normal range of motion.   Cardiovascular: Normal rate, regular rhythm and normal heart sounds.   Pulmonary/Chest: Effort normal and breath sounds normal.   Abdominal: Normal appearance.   Musculoskeletal: Normal range of motion.        Cervical back: He exhibits pain.   Neurological: He is alert and oriented to person, place, and time. He has normal reflexes. He displays normal reflexes. No cranial nerve deficit or sensory deficit. Gait abnormal. GCS eye subscore is 4. GCS verbal subscore is 5. GCS motor subscore is 6.   Muscle wasting noted in the right hand.  Decreased  strength   Skin: Skin is warm.   Psychiatric: He has a normal mood and affect. His speech is normal and behavior is normal. Thought content normal. Cognition " and memory are normal.       Results Review: MRI of the cervical spine that was done in November 2018 shows patient has right-sided spurs at C3-4 and 4-5.  There is also a nerve conduction study that done from [facility] by Dr. Rush which was concerning for carpal tunnel syndrome.        Assessment/Plan: At this point I am going to send the patient for set of x-rays of the cervical spine to include standing flexion extension.  I am also going to set him up to do a dedicated course of physical therapy consisting of 2-3 times a week for 4 to 6 weeks.  I am also concerned that the patient is having a cubital tunnel syndrome issue as there is evidence of thenar wasting.  We will follow-up with him once the therapy and imaging and testing is completed.  He was told to call us if he had any worsening issues.  BMI shows that he is overweight.  BMI chart was given to the patient.  He is a non-smoker    Jayce was seen today for neck pain.    Diagnoses and all orders for this visit:    Cervicalgia  -     XR Spine Cervical Complete With Obli Flex Ext; Future  -     Ambulatory Referral to Physical Therapy Evaluate and treat (2-3 days a week for 4-6 weeks )    Cervical radiculopathy  -     XR Spine Cervical Complete With Obli Flex Ext; Future  -     Ambulatory Referral to Physical Therapy Evaluate and treat (2-3 days a week for 4-6 weeks )    Cubital tunnel syndrome on right  -     EMG & Nerve Conduction Test; Future  -     Ambulatory Referral to Physical Therapy Evaluate and treat (2-3 days a week for 4-6 weeks )    Body mass index (bmi) 30.0-30.9, adult    Non-smoker          I discussed the patients findings and my recommendations with patient    Terrance Ayala, WHITNEY  02/05/20  1:06 PM

## 2020-02-05 NOTE — PATIENT INSTRUCTIONS

## 2020-02-11 ENCOUNTER — HOSPITAL ENCOUNTER (OUTPATIENT)
Dept: NEUROLOGY | Facility: HOSPITAL | Age: 35
Discharge: HOME | End: 2020-02-11
Admitting: NURSE PRACTITIONER

## 2020-02-11 DIAGNOSIS — G56.21 CUBITAL TUNNEL SYNDROME ON RIGHT: ICD-10-CM

## 2020-02-11 PROCEDURE — 95909 NRV CNDJ TST 5-6 STUDIES: CPT

## 2020-02-11 PROCEDURE — 95886 MUSC TEST DONE W/N TEST COMP: CPT

## 2020-02-13 ENCOUNTER — TREATMENT (OUTPATIENT)
Dept: PHYSICAL THERAPY | Facility: CLINIC | Age: 35
End: 2020-02-13

## 2020-02-13 DIAGNOSIS — M54.12 CERVICAL RADICULOPATHY: ICD-10-CM

## 2020-02-13 DIAGNOSIS — M54.2 CERVICALGIA: Primary | ICD-10-CM

## 2020-02-13 DIAGNOSIS — G56.21 CUBITAL TUNNEL SYNDROME ON RIGHT: ICD-10-CM

## 2020-02-13 PROCEDURE — 97161 PT EVAL LOW COMPLEX 20 MIN: CPT | Performed by: PHYSICAL THERAPIST

## 2020-02-13 NOTE — PROGRESS NOTES
Physical Therapy Initial Evaluation and Plan of Care      Patient: Jayce Gonzalez III   : 1985  Diagnosis/ICD-10 Code:  Cervicalgia [M54.2]  Referring practitioner: WHITNEY Kingston  Date of Initial Visit: 2020  Today's Date: 2020  Patient seen for 1 sessions    Recheck due: 3/5/20  MD appt: 3/26/20  Auth: Requires auth         Subjective Questionnaire: NDI: 44%      Subjective Evaluation    History of Present Illness  Onset date: Chronic.  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 Pain travels down outside/ular side of his arm and down to his hand. Also has numbness and tingling. MD states he's lost some of his muscle in his thenar eminence. Pt reports he also has a lot of pain at medial elbow. Had EMG/NCV completed but unsure of results. When his pain gets really bad it travels up the radial side. End of day his pain is worse, his arm is weak, and has headaches.      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; crawl, lift ladders Pain  Current pain ratin  At best pain ratin  At worst pain ratin  Quality: dull ache, sharp, tight and needle-like  Relieving factors: ice (Muscle relaxer at night prn)  Aggravating factors: overhead activity, movement, lifting, sleeping, prolonged positioning, outstretched reach and repetitive movement  Progression: worsening    Social Support  Lives with: spouse    Hand dominance: left (Uses right hand for majority of activities although he is L hand dominant)    Diagnostic Tests  X-ray: abnormal (right-sided spurs at C3-4 and 4-5)    Treatments  Previous treatment: physical therapy  Current treatment: medication  Patient Goals  Patient goals for therapy: decreased pain, increased motion, increased strength and independence with ADLs/IADLs             Objective       Postural Observations    Additional  Postural Observation Details  Fair postural awareness overall. Rounded shoulders posture. Tends to slump while seated. Mild cervical and RUE guarding noted.    Palpation     Right Tenderness of the levator scapulae, middle trapezius and upper trapezius.     Neurological Testing     Sensation   Cervical/Thoracic   Left   Intact: light touch    Right   Intact: light touch  Paresthesia: light touch    Reflexes   Left   Biceps (C5/C6): normal (2+)  Brachioradialis (C6): normal (2+)  Triceps (C7): normal (2+)    Right   Biceps (C5/C6): normal (2+)  Brachioradialis (C6): normal (2+)  Triceps (C7): normal (2+)    Active Range of Motion   Cervical/Thoracic Spine   Cervical    Flexion: 40 degrees   Extension: 40 (pn R neck/shoulder) degrees   Left lateral flexion: 42 degrees   Right lateral flexion: 35 degrees with pain  Left rotation: 75 degrees   Right rotation: 55 degrees with pain    Strength/Myotome Testing     Left Shoulder     Planes of Motion   Flexion: 5   Abduction: 5     Right Shoulder     Planes of Motion   Flexion: 4-   Abduction: 4     Left Elbow   Flexion: 5  Extension: 5    Right Elbow   Flexion: 4  Extension: 4-    Left Wrist/Hand   Wrist extension: 5  Wrist flexion: 5    Thumb Strength  Key/Lateral Pinch     Trial 1: 120 lbs    Trial 2: 105 lbs    Trial 3: 100 lbs    Average: 108.33 lbs    Right Wrist/Hand   Wrist extension: 5  Wrist flexion: 5    Thumb Strength   Key/Lateral Pinch     Trial 1: 56 lbs    Trial 2: 60 lbs    Trial 3: 60 lbs    Average: 58.67 lbs    Tests   Cervical     Left   Negative cervical distraction, Spurling's sign and ULTT4.     Right   Positive cervical distraction, Spurling's sign and ULTT4.   Negative ULTT1 and ULTT3.     Additional Tests Details  Very TTP at cubital tunnel on R         Assessment & Plan     Assessment  Impairments: abnormal or restricted ROM, activity intolerance, impaired physical strength, lacks appropriate home exercise program and pain with  function  Assessment details: Pt is a 34 y.o. Male presenting with chronic neck & RUE pain/weakness that is limiting performance of functional mobility, work activities, and ADL's. Special testing positive for RUE radiculopathy. Pt is also quite TTP at cubital tunnel at R elbow and demonstrates positive neurotension in ulnar nerve distribution. Notable muscle atrophy at thenar eminence of R hand as well weakness in  and elbow strength. Pt has good response with manual cervical distraction and reports that it improved some of his tingling symptoms in RUE and hand. Decreased overall postural awareness noted. Pt will benefit from skilled PT services to address these deficits for improvements in radicular symptoms, cervical mobility/stability, postural stability, functional strength, and tolerance to daily functional & work activities.  Prognosis: good  Functional Limitations: carrying objects, lifting, sleeping, pushing, uncomfortable because of pain, reaching overhead and unable to perform repetitive tasks  Goals  Plan Goals: STG's by 3/5/20  1) Demonstrate independence/compliance in HEP  2) Tolerate 45 min treatment session without increased pain  3) Subjectively report 30% improvement in RUE pain/symptoms  4) Demonstrate improved R elbow ext MMT to 4/5 with minimal pain  5) Demonstrate improved R cervical rotation AROM to 65 deg or greater    LTG's by 3/26/20  1) Subjectively report 50% overall improvement or greater  2) Improve NDI score to 35% or less  3) Demonstrate improved R shoulder flex/abd MMT to 4+/5  4) Demonstrate improved R elbow flex/ext MMT to 4+/5  5) Subjectively report no RUE symptoms distal to elbow  6) Demonstrate improved R  strength to 65# or greater    Plan  Therapy options: will be seen for skilled physical therapy services  Planned modality interventions: cryotherapy and traction  Planned therapy interventions: functional ROM exercises, flexibility, home exercise program, joint  mobilization, body mechanics training, manual therapy, neuromuscular re-education, postural training, soft tissue mobilization, strengthening, stretching and therapeutic activities  Duration in visits: 12  Treatment plan discussed with: patient  Plan details: Focus on centralizing radicular symptoms. Manual distraction - add mechanical cervical traction next visit. Ulnar nerve glide/mobilizations. Wrist flex/ext stretching. Cervical stabilization and mobility. STM to UT and interscap muscles on R. Ice for pain; heat aggravates symptoms.        Timed:  Manual Therapy:         mins  10455;  Therapeutic Exercise:         mins  94142;     Neuromuscular Jeanine:        mins  04426;    Therapeutic Activity:          mins  05826;     Gait Training:           mins  17752;     Ultrasound:          mins  12281;    Electrical Stimulation:         mins  99440 ( );    Untimed:  Electrical Stimulation:         mins  74800 ( );  Mechanical Traction:         mins  93338;     Timed Treatment:   0   mins   Total Treatment:     44   mins    PT SIGNATURE: Nupur Orr, PT   DATE TREATMENT INITIATED: 2/13/2020    Initial Certification  Certification Period: 5/13/2020  I certify that the therapy services are furnished while this patient is under my care.  The services outlined above are required by this patient, and will be reviewed every 90 days.     PHYSICIAN: Terrance Ayala APRN      DATE:     Please sign and return via fax to  .. Thank you, [facility].

## 2020-02-20 ENCOUNTER — TREATMENT (OUTPATIENT)
Dept: PHYSICAL THERAPY | Facility: CLINIC | Age: 35
End: 2020-02-20

## 2020-02-20 DIAGNOSIS — M54.2 CERVICALGIA: Primary | ICD-10-CM

## 2020-02-20 DIAGNOSIS — M54.12 CERVICAL RADICULOPATHY: ICD-10-CM

## 2020-02-20 DIAGNOSIS — G56.21 CUBITAL TUNNEL SYNDROME ON RIGHT: ICD-10-CM

## 2020-02-20 PROCEDURE — 97140 MANUAL THERAPY 1/> REGIONS: CPT | Performed by: PHYSICAL THERAPIST

## 2020-02-20 PROCEDURE — 97110 THERAPEUTIC EXERCISES: CPT | Performed by: PHYSICAL THERAPIST

## 2020-02-20 PROCEDURE — 97012 MECHANICAL TRACTION THERAPY: CPT | Performed by: PHYSICAL THERAPIST

## 2020-02-20 NOTE — PROGRESS NOTES
Physical Therapy Daily Progress Note      Patient: Jayce Gonzalez III   : 1985  Diagnosis/ICD-10 Code:  Cervicalgia [M54.2]  Referring practitioner: No ref. provider found  Date of Initial Visit: Type: THERAPY  Noted: 2020  Today's Date: 2020  Patient seen for 2 sessions         Jayce Gonzalez reports: na      Subjective Evaluation    History of Present Illness    Subjective comment: Pt reports he is compliant wiht HEP, Pt reports he is sore this datePain  Current pain ratin           Objective   See Exercise, Manual, and Modality Logs for complete treatment.       Assessment & Plan     Assessment  Assessment details: Pt TTP to L UT, good tolerance with new there ex this date. Pt does have slight tingling in R hand post traction. Pt reports no better after traction    Goals  Plan Goals: Plan Goals: STG's by 3/5/20  1) Demonstrate independence/compliance in HEP  2) Tolerate 45 min treatment session without increased pain  3) Subjectively report 30% improvement in RUE pain/symptoms  4) Demonstrate improved R elbow ext MMT to 4/5 with minimal pain  5) Demonstrate improved R cervical rotation AROM to 65 deg or greater    LTG's by 3/26/20  1) Subjectively report 50% overall improvement or greater  2) Improve NDI score to 35% or less  3) Demonstrate improved R shoulder flex/abd MMT to 4+/5  4) Demonstrate improved R elbow flex/ext MMT to 4+/5  5) Subjectively report no RUE symptoms distal to elbow  6) Demonstrate improved R  strength to 65# or greater    Plan  Plan details: Mid rows and shoulder ext with tband as tolerated        Progress per Plan of Care and Progress strengthening /stabilization /functional activity           Manual Therapy:    8     mins  03784;  Therapeutic Exercise:    33     mins  05185;     Neuromuscular Jeanine:        mins  28695;    Therapeutic Activity:          mins  93151;     Gait Training:           mins  54849;     Ultrasound:          mins  09752;    Electrical  Stimulation:         mins  81517 ( );  Dry Needling          mins self-pay   cervical traction     15 min        15650  Timed Treatment:   41   mins   Total Treatment:     56   mins    Alfonso Cash, FRANCISCA  Physical Therapist

## 2020-02-21 ENCOUNTER — TREATMENT (OUTPATIENT)
Dept: PHYSICAL THERAPY | Facility: CLINIC | Age: 35
End: 2020-02-21

## 2020-02-21 DIAGNOSIS — M54.2 CERVICALGIA: Primary | ICD-10-CM

## 2020-02-21 DIAGNOSIS — M54.12 CERVICAL RADICULOPATHY: ICD-10-CM

## 2020-02-21 PROCEDURE — 97012 MECHANICAL TRACTION THERAPY: CPT | Performed by: PHYSICAL THERAPIST

## 2020-02-21 PROCEDURE — 97140 MANUAL THERAPY 1/> REGIONS: CPT | Performed by: PHYSICAL THERAPIST

## 2020-02-21 PROCEDURE — 97110 THERAPEUTIC EXERCISES: CPT | Performed by: PHYSICAL THERAPIST

## 2020-02-21 NOTE — PROGRESS NOTES
Physical Therapy Daily Progress Note      Patient: Jayce Gonzalez III   : 1985  Referring practitioner: WHITNEY Kingston  Date of Initial Visit: Type: THERAPY  Noted: 2020  Today's Date: 2020  Patient seen for 3 sessions    Recheck due: 3/5/20  MD appt: 3/26/20  Auth: Renu Gonzalez reports: no improvement yet        Subjective Evaluation    History of Present Illness    Subjective comment: pt states that he is hurting worse today- not sure why- could be from therapy yesterday. States that cervical traction feels good while hes on it and it may last a couple of hours then pain comes backPain  Current pain ratin           Objective       Static Posture     Comments  Required cues for posture in seated position       See Exercise, Manual, and Modality Logs for complete treatment.       Assessment & Plan     Assessment  Assessment details: Pt complains of pain throughout treatment- especially going down the arm. Pt only able to complete x10 reps of shoulder ext due to radiating symptoms. Positive response to mechanical traction. Decrease muscle tone on RUT compared to L.     Goals  Plan Goals: STG's by 3/5/20  1) Demonstrate independence/compliance in HEP  2) Tolerate 45 min treatment session without increased pain  3) Subjectively report 30% improvement in RUE pain/symptoms  4) Demonstrate improved R elbow ext MMT to 4/5 with minimal pain  5) Demonstrate improved R cervical rotation AROM to 65 deg or greater    LTG's by 3/26/20  1) Subjectively report 50% overall improvement or greater  2) Improve NDI score to 35% or less  3) Demonstrate improved R shoulder flex/abd MMT to 4+/5  4) Demonstrate improved R elbow flex/ext MMT to 4+/5  5) Subjectively report no RUE symptoms distal to elbow  6) Demonstrate improved R  strength to 65# or greater        Plan  Duration in visits: 12  Plan details: Cont mechanical cervical traction. Introduce cervical isometrics        Visit  Diagnoses:    ICD-10-CM ICD-9-CM   1. Cervicalgia M54.2 723.1   2. Cervical radiculopathy M54.12 723.4       Progress per Plan of Care and Progress strengthening /stabilization /functional activity           Timed:  Manual Therapy:    10     mins  46940;  Therapeutic Exercise:    30     mins  08549;     Neuromuscular Jeanine:        mins  90318;    Therapeutic Activity:          mins  18812;     Gait Training:           mins  59050;     Ultrasound:          mins  62075;    Electrical Stimulation:         mins  20696 ( );    Untimed:  Electrical Stimulation:         mins  70121 ( );  Mechanical Traction:    15     mins  55544;     Timed Treatment:   40   mins   Total Treatment:     55   mins  Za Cobian [facility]  Physical Therapist

## 2020-02-27 ENCOUNTER — TREATMENT (OUTPATIENT)
Dept: PHYSICAL THERAPY | Facility: CLINIC | Age: 35
End: 2020-02-27

## 2020-02-27 DIAGNOSIS — M54.2 CERVICALGIA: Primary | ICD-10-CM

## 2020-02-27 DIAGNOSIS — M54.12 CERVICAL RADICULOPATHY: ICD-10-CM

## 2020-02-27 PROCEDURE — 97012 MECHANICAL TRACTION THERAPY: CPT | Performed by: PHYSICAL THERAPIST

## 2020-02-27 PROCEDURE — 97110 THERAPEUTIC EXERCISES: CPT | Performed by: PHYSICAL THERAPIST

## 2020-02-27 PROCEDURE — 97140 MANUAL THERAPY 1/> REGIONS: CPT | Performed by: PHYSICAL THERAPIST

## 2020-02-27 NOTE — PROGRESS NOTES
"   Physical Therapy Daily Progress Note      Patient: Jayce Gonzalez III   : 1985  Referring practitioner: WHITNEY Kingston  Date of Initial Visit: Type: THERAPY  Noted: 2020  Today's Date: 2020  Patient seen for 4 sessions    Recheck due: 3/5/20  MD appt: 3/26/20  Auth: Renu Gonzalez reports:     Subjective Evaluation    History of Present Illness    Subjective comment: \"feel like neck getting worse rather than better\"Pain  Current pain ratin           Objective   See Exercise, Manual, and Modality Logs for complete treatment.       Assessment & Plan     Assessment  Assessment details: Pt cont with same c/o's; a little better doreen on some excs and did keep this gentle. Seems to feel some better post traction but reports doesn't last long. No significant passive neural tightness noted R UE but pt has significant c/o's.    Goals  Plan Goals: STG's by 3/5/20  1) Demonstrate independence/compliance in HEP  2) Tolerate 45 min treatment session without increased pain  3) Subjectively report 30% improvement in RUE pain/symptoms  4) Demonstrate improved R elbow ext MMT to 4/5 with minimal pain  5) Demonstrate improved R cervical rotation AROM to 65 deg or greater    LTG's by 3/26/20  1) Subjectively report 50% overall improvement or greater  2) Improve NDI score to 35% or less  3) Demonstrate improved R shoulder flex/abd MMT to 4+/5  4) Demonstrate improved R elbow flex/ext MMT to 4+/5  5) Subjectively report no RUE symptoms distal to elbow  6) Demonstrate improved R  strength to 65# or greater        Plan  Duration in visits: 12  Plan details: Cont mechanical cervical traction. Introduce cervical isometrics nxt visit, non painful; Reinforce posture with all exc        Visit Diagnoses:    ICD-10-CM ICD-9-CM   1. Cervicalgia M54.2 723.1   2. Cervical radiculopathy M54.12 723.4       Progress per Plan of Care           Timed:  Manual Therapy:    10     mins  32072;  Therapeutic " Exercise:    30     mins  91667;     Neuromuscular Jeanine:        mins  83257;    Therapeutic Activity:          mins  64251;     Gait Training:           mins  08272;     Ultrasound:          mins  75369;    Electrical Stimulation:         mins  89334 ( );    Untimed:  Electrical Stimulation:         mins  59498 ( );  Mechanical Traction:    15     mins  51690;  Paraffin                               mins  99925;     Timed Treatment:   40   mins   Total Treatment:     55   mins  Hillary Haro PTA  Physical Therapist

## 2020-02-28 ENCOUNTER — TREATMENT (OUTPATIENT)
Dept: PHYSICAL THERAPY | Facility: CLINIC | Age: 35
End: 2020-02-28

## 2020-02-28 DIAGNOSIS — M54.2 CERVICALGIA: Primary | ICD-10-CM

## 2020-02-28 DIAGNOSIS — M54.12 CERVICAL RADICULOPATHY: ICD-10-CM

## 2020-02-28 PROCEDURE — 97140 MANUAL THERAPY 1/> REGIONS: CPT | Performed by: PHYSICAL THERAPIST

## 2020-02-28 PROCEDURE — 97012 MECHANICAL TRACTION THERAPY: CPT | Performed by: PHYSICAL THERAPIST

## 2020-02-28 PROCEDURE — 97110 THERAPEUTIC EXERCISES: CPT | Performed by: PHYSICAL THERAPIST

## 2020-02-28 NOTE — PROGRESS NOTES
"   Physical Therapy Daily Progress Note      Patient: Jayce Gonzalez III   : 1985  Referring practitioner: WHITNEY Kingston  Date of Initial Visit: Type: THERAPY  Noted: 2020  Today's Date: 2020  Patient seen for 5 sessions    Recheck due: 3/5/20  MD appt: 3/26/20  Auth: Renu Gonzalez reports: no improvement        Subjective Evaluation    History of Present Illness    Subjective comment: pt states that he hurt worse after treatment yesterday. \"The more I use it the more it hurts\"Pain  Current pain ratin           Objective       Static Posture     Comments  Cues for posture throughout. Fwd/rounded shoulder and fwd head at times.     See Exercise, Manual, and Modality Logs for complete treatment.       Assessment & Plan     Assessment  Assessment details: Pt continues to have increase pain the more that he uses his arm. Has seen no improvement since starting therapy. Increase pain especially with tband shoulder ext- so discontinued. Significant S with ulnar nerve glides.    Goals  Plan Goals: STG's by 3/5/20  1) Demonstrate independence/compliance in HEP  2) Tolerate 45 min treatment session without increased pain  3) Subjectively report 30% improvement in RUE pain/symptoms  4) Demonstrate improved R elbow ext MMT to 4/5 with minimal pain  5) Demonstrate improved R cervical rotation AROM to 65 deg or greater    LTG's by 3/26/20  1) Subjectively report 50% overall improvement or greater  2) Improve NDI score to 35% or less  3) Demonstrate improved R shoulder flex/abd MMT to 4+/5  4) Demonstrate improved R elbow flex/ext MMT to 4+/5  5) Subjectively report no RUE symptoms distal to elbow  6) Demonstrate improved R  strength to 65# or greater          Plan  Duration in visits: 12  Plan details: Cont manual. Monitor pain levels throughout treatment. 3 more approved visit        Visit Diagnoses:    ICD-10-CM ICD-9-CM   1. Cervicalgia M54.2 723.1   2. Cervical radiculopathy " M54.12 723.4       Progress per Plan of Care and Progress strengthening /stabilization /functional activity           Timed:  Manual Therapy:    15     mins  49813;  Therapeutic Exercise:    30     mins  85761;     Neuromuscular Jeanine:        mins  85554;    Therapeutic Activity:          mins  23856;     Gait Training:           mins  44615;     Ultrasound:          mins  00412;    Electrical Stimulation:         mins  80105 ( );    Untimed:  Electrical Stimulation:         mins  62194 ( );  Mechanical Traction:    15     mins  39342;     Timed Treatment:   45   mins   Total Treatment:     60   mins  Za Cobian, [facility]  Physical Therapist

## 2020-03-05 ENCOUNTER — TREATMENT (OUTPATIENT)
Dept: PHYSICAL THERAPY | Facility: CLINIC | Age: 35
End: 2020-03-05

## 2020-03-05 DIAGNOSIS — G56.21 CUBITAL TUNNEL SYNDROME ON RIGHT: ICD-10-CM

## 2020-03-05 DIAGNOSIS — M54.12 CERVICAL RADICULOPATHY: ICD-10-CM

## 2020-03-05 DIAGNOSIS — M54.2 CERVICALGIA: Primary | ICD-10-CM

## 2020-03-05 PROCEDURE — 97110 THERAPEUTIC EXERCISES: CPT | Performed by: PHYSICAL THERAPIST

## 2020-03-05 PROCEDURE — 97012 MECHANICAL TRACTION THERAPY: CPT | Performed by: PHYSICAL THERAPIST

## 2020-03-05 PROCEDURE — 97140 MANUAL THERAPY 1/> REGIONS: CPT | Performed by: PHYSICAL THERAPIST

## 2020-03-05 NOTE — PROGRESS NOTES
"   Physical Therapy Daily Progress Note      Patient: Jayce Gonzalez III   : 1985  Referring practitioner: WHITNEY Kingston  Date of Initial Visit: Type: THERAPY  Noted: 2020  Today's Date: 3/5/2020  Patient seen for 6 sessions    Recheck due: 3/5/20  MD appt: 3/19/20  Auth: 8 visits until 3/18/20       Jayce Gonzalez reports: \"not much improvement\"      Subjective Evaluation    History of Present Illness    Subjective comment: HAd to call in to work on Monday due to pain. Couldn't hardly move his arm over the weekend. Got his MD appt moved up to 3/19/20Pain  Current pain rating: 3           Objective       Postural Observations    Additional Postural Observation Details  Frequent postural cues required during treatment. Forward head posture. Does seem to have some R rotation at C5/6segment. Improved with manual/mobilizations     See Exercise, Manual, and Modality Logs for complete treatment.       Assessment & Plan     Assessment  Assessment details: Pt had MD appointment moved up and goes back in 2 weeks. Will continue to see pt until his MD appointment. Able to progress to standing cervical isometrics to which pt did fairly well. Does need frequent postural cues throughout. Fatigues quickly with prone horizontal abd. Continues with some relief with cervical traction.    Goals  Plan Goals: STG's by 3/5/20  1) Demonstrate independence/compliance in HEP  2) Tolerate 45 min treatment session without increased pain  3) Subjectively report 30% improvement in RUE pain/symptoms  4) Demonstrate improved R elbow ext MMT to 4/5 with minimal pain  5) Demonstrate improved R cervical rotation AROM to 65 deg or greater    LTG's by 3/26/20  1) Subjectively report 50% overall improvement or greater  2) Improve NDI score to 35% or less  3) Demonstrate improved R shoulder flex/abd MMT to 4+/5  4) Demonstrate improved R elbow flex/ext MMT to 4+/5  5) Subjectively report no RUE symptoms distal to elbow  6) Demonstrate " improved R  strength to 65# or greater          Plan  Duration in visits: 12  Plan details: Recheck scheduled for next week. Continue cervical isometrics and postural/cervical stability activities. Possible standing chin tucks next visit. Continue postural stability. Continue prone activities and mechanical traction        Visit Diagnoses:    ICD-10-CM ICD-9-CM   1. Cervicalgia M54.2 723.1   2. Cervical radiculopathy M54.12 723.4   3. Cubital tunnel syndrome on right G56.21 354.2       Progress per Plan of Care and Progress strengthening /stabilization /functional activity           Timed:  Manual Therapy:    10     mins  92223;  Therapeutic Exercise:    36     mins  46502;     Neuromuscular Jeanine:        mins  46087;    Therapeutic Activity:          mins  31684;     Gait Training:           mins  09434;     Ultrasound:          mins  89546;    Electrical Stimulation:         mins  97455 ( );    Untimed:  Electrical Stimulation:         mins  40100 ( );  Mechanical Traction:    15     mins  76156;     Timed Treatment:   46   mins   Total Treatment:     61   mins  Nupur Orr, PT  Physical Therapist

## 2020-03-06 ENCOUNTER — TREATMENT (OUTPATIENT)
Dept: PHYSICAL THERAPY | Facility: CLINIC | Age: 35
End: 2020-03-06

## 2020-03-06 DIAGNOSIS — G56.21 CUBITAL TUNNEL SYNDROME ON RIGHT: ICD-10-CM

## 2020-03-06 DIAGNOSIS — M54.2 CERVICALGIA: Primary | ICD-10-CM

## 2020-03-06 DIAGNOSIS — M54.12 CERVICAL RADICULOPATHY: ICD-10-CM

## 2020-03-06 PROCEDURE — 97110 THERAPEUTIC EXERCISES: CPT | Performed by: PHYSICAL THERAPIST

## 2020-03-06 PROCEDURE — 97140 MANUAL THERAPY 1/> REGIONS: CPT | Performed by: PHYSICAL THERAPIST

## 2020-03-06 NOTE — PROGRESS NOTES
Physical Therapy Daily Progress Note      Patient: Jayce Gonzalez III   : 1985  Referring practitioner: WHITNEY Kingston  Date of Initial Visit: Type: THERAPY  Noted: 2020  Today's Date: 3/6/2020  Patient seen for 7 sessions  Recert due:  3-05-20  MD followup:  3-19-20       Jayce Gonzalez reports: No improvement yet    Subjective Questionnaire:       Subjective   Pt reports that his pain increased after last treatment.  Wants to hold traction today and see if he does better without it.  Pre RX pain 6/10.    Objective   See Exercise, Manual, and Modality Logs for complete treatment.       Assessment & Plan     Assessment  Assessment details: Pt conts to report high pain level with neck & R UE.  Requested to hold traction today because he felt that it made him hurt worse last visit.  Does well with all therex and requires infrequent verbal cues for posture.  Good tolerance for manual treatment.     Goals  Plan Goals: STG's by 3/5/20  1) Demonstrate independence/compliance in HEP  2) Tolerate 45 min treatment session without increased pain  3) Subjectively report 30% improvement in RUE pain/symptoms  4) Demonstrate improved R elbow ext MMT to 4/5 with minimal pain  5) Demonstrate improved R cervical rotation AROM to 65 deg or greater    LTG's by 3/26/20  1) Subjectively report 50% overall improvement or greater  2) Improve NDI score to 35% or less  3) Demonstrate improved R shoulder flex/abd MMT to 4+/5  4) Demonstrate improved R elbow flex/ext MMT to 4+/5  5) Subjectively report no RUE symptoms distal to elbow  6) Demonstrate improved R  strength to 65# or greater          Plan  Duration in visits: 12  Plan details: Assess reaction following today's visit and determine if mechanical traction should be resumed.         Visit Diagnoses:    ICD-10-CM ICD-9-CM   1. Cervicalgia M54.2 723.1   2. Cervical radiculopathy M54.12 723.4   3. Cubital tunnel syndrome on right G56.21 354.2                   Timed:  Manual Therapy:   10      mins  29012;  Therapeutic Exercise:    25     mins  37007;     Neuromuscular Jeanine:        mins  12742;    Therapeutic Activity:          mins  70801;     Gait Training:           mins  18464;     Ultrasound:          mins  68481;    Electrical Stimulation:         mins  60725 ( );    Untimed:  Electrical Stimulation:         mins  60435 ( );  Mechanical Traction:         mins  14249;     Timed Treatment: 35    mins   Total Treatment:    35    mins  Lexii Quesada [facility]  Physical Therapist

## 2020-03-12 ENCOUNTER — TREATMENT (OUTPATIENT)
Dept: PHYSICAL THERAPY | Facility: CLINIC | Age: 35
End: 2020-03-12

## 2020-03-12 DIAGNOSIS — G56.21 CUBITAL TUNNEL SYNDROME ON RIGHT: ICD-10-CM

## 2020-03-12 DIAGNOSIS — M54.12 CERVICAL RADICULOPATHY: ICD-10-CM

## 2020-03-12 DIAGNOSIS — M54.2 CERVICALGIA: Primary | ICD-10-CM

## 2020-03-12 PROCEDURE — 97140 MANUAL THERAPY 1/> REGIONS: CPT | Performed by: PHYSICAL THERAPIST

## 2020-03-12 PROCEDURE — 97110 THERAPEUTIC EXERCISES: CPT | Performed by: PHYSICAL THERAPIST

## 2020-03-12 PROCEDURE — 97012 MECHANICAL TRACTION THERAPY: CPT | Performed by: PHYSICAL THERAPIST

## 2020-03-12 NOTE — PROGRESS NOTES
Re-Assessment / Re-Certification      Patient: Jayce Gonzalez III   : 1985  Diagnosis/ICD-10 Code:  Cervicalgia [M54.2]  Referring practitioner: WHITNEY Kingston  Date of Initial Visit: Type: THERAPY  Noted: 2020  Today's Date: 3/12/2020  Patient seen for 8 sessions    Recheck due: 20  MD appt: 3/19/20  Auth: 8 visits until 3/18/20    Subjective:   Jayce Gonzalez reports: no improvement  Subjective Questionnaire: NDI: 46%  Clinical Progress: unchanged  Home Program Compliance: Yes  Treatment has included: therapeutic exercise, neuromuscular re-education, manual therapy and traction    Subjective Evaluation    History of Present Illness    Subjective comment: Pt states he's feeling okay this morning. Just his normal pain. Always hurts worse after work. Reports nothing has changed much as far as his symptoms in his RUE and neck. States he thought maybe the traction aggravated it last Thursday so they didn't do it on Friday. Would like to try it again today.Pain  Current pain ratin         Objective       Postural Observations    Additional Postural Observation Details  Improving postural awareness noted; less cuing required throughout treatment. Mild cervical and RUE guarding noted.    Palpation     Right Tenderness of the levator scapulae, middle trapezius and upper trapezius.     Neurological Testing     Sensation   Cervical/Thoracic   Left   Intact: light touch    Right   Intact: light touch  Paresthesia: light touch    Reflexes   Left   Biceps (C5/C6): normal (2+)  Brachioradialis (C6): normal (2+)  Triceps (C7): normal (2+)    Right   Biceps (C5/C6): normal (2+)  Brachioradialis (C6): normal (2+)  Triceps (C7): normal (2+)    Active Range of Motion   Cervical/Thoracic Spine   Cervical    Flexion: 40 degrees   Extension: 40 (pn R neck/shoulder) degrees   Left lateral flexion: 42 degrees   Right lateral flexion: 38 degrees with pain  Left rotation: 75 degrees   Right rotation: 60 degrees  with pain    Strength/Myotome Testing     Left Shoulder     Planes of Motion   Flexion: 5   Abduction: 5     Right Shoulder     Planes of Motion   Flexion: 4   Abduction: 4     Left Elbow   Flexion: 5  Extension: 5    Right Elbow   Flexion: 4  Extension: 4-    Left Wrist/Hand   Wrist extension: 5  Wrist flexion: 5    Right Wrist/Hand   Wrist extension: 5  Wrist flexion: 5    Thumb Strength   Key/Lateral Pinch     Trial 1: 60 lbs    Trial 2: 55 lbs    Trial 3: 55 lbs    Average: 56.67 lbs    Tests   Cervical     Left   Negative cervical distraction, Spurling's sign and ULTT4.     Right   Positive cervical distraction, Spurling's sign and ULTT4.   Negative ULTT1 and ULTT3.     Right Elbow   Positive Tinel's sign (cubital tunnel).     Additional Tests Details  Very TTP at cubital tunnel on R     Assessment & Plan     Assessment  Impairments: abnormal or restricted ROM, activity intolerance, impaired physical strength, lacks appropriate home exercise program and pain with function  Assessment details: Pt is making minimal progress with PT at this time. Mild improvements noted in overall cervical mobility. Pt continues to demonstrates RUE radicular symptoms, to which pt reports no change or improvements in this. Pt continues with decreased overall postural awareness but does require less cuing during treatment. Pt tolerates therex/stabilization activities and progressions quite well. Pt continues with ulnar nerve tension in RUE. Overall pt reports no improvements and continues to have increase pain in neck & RUE. Will see pt for one more visit and will place on hold at that time. Pt returns to the MD on 3/19/20.  Prognosis: fair  Functional Limitations: carrying objects, lifting, sleeping and uncomfortable because of pain  Goals  Plan Goals: STG's by 3/5/20  1) Demonstrate independence/compliance in HEP  2) Tolerate 45 min treatment session without increased pain  3) Subjectively report 30% improvement in RUE  pain/symptoms  4) Demonstrate improved R elbow ext MMT to 4/5 with minimal pain  5) Demonstrate improved R cervical rotation AROM to 65 deg or greater    LTG's by 3/26/20  1) Subjectively report 50% overall improvement or greater  2) Improve NDI score to 35% or less  3) Demonstrate improved R shoulder flex/abd MMT to 4+/5  4) Demonstrate improved R elbow flex/ext MMT to 4+/5  5) Subjectively report no RUE symptoms distal to elbow  6) Demonstrate improved R  strength to 65# or greater    Plan  Therapy options: will be seen for skilled physical therapy services  Planned modality interventions: thermotherapy (hydrocollator packs), cryotherapy and traction  Planned therapy interventions: body mechanics training, flexibility, home exercise program, joint mobilization, manual therapy, neuromuscular re-education, postural training, soft tissue mobilization, strengthening, stretching and therapeutic activities  Duration in visits: 10  Treatment plan discussed with: patient  Plan details: Will plan to place pt on hold at next visit. Follow up on mechanical traction and continue if no issue.        Visit Diagnoses:    ICD-10-CM ICD-9-CM   1. Cervicalgia M54.2 723.1   2. Cervical radiculopathy M54.12 723.4   3. Cubital tunnel syndrome on right G56.21 354.2       Progress toward previous goals: Partially Met      Recommendations: Continue as planned  Timeframe: 3 weeks  Prognosis to achieve goals: fair    PT Signature: Nupur Orr, PT      Based upon review of the patient's progress and continued therapy plan, it is my medical opinion that Jayce Gonzalez should continue physical therapy treatment at [facility]  [address] [address] [phone].    Signature: __________________________________  Terrance Ayala APRN    Timed:  Manual Therapy:    9     mins  47980;  Therapeutic Exercise:    36     mins  39654;     Neuromuscular Jeanine:        mins   34324;    Therapeutic Activity:          mins  11604;     Gait Training:           mins  61478;     Ultrasound:          mins  98485;    Electrical Stimulation:         mins  47774 ( );    Untimed:  Electrical Stimulation:         mins  28077 ( );  Mechanical Traction:    15     mins  46101;     Timed Treatment:   45   mins   Total Treatment:     60   mins

## 2020-03-13 ENCOUNTER — TREATMENT (OUTPATIENT)
Dept: PHYSICAL THERAPY | Facility: CLINIC | Age: 35
End: 2020-03-13

## 2020-03-13 DIAGNOSIS — M54.12 CERVICAL RADICULOPATHY: ICD-10-CM

## 2020-03-13 DIAGNOSIS — M54.2 CERVICALGIA: Primary | ICD-10-CM

## 2020-03-13 DIAGNOSIS — G56.21 CUBITAL TUNNEL SYNDROME ON RIGHT: ICD-10-CM

## 2020-03-13 PROCEDURE — 97140 MANUAL THERAPY 1/> REGIONS: CPT | Performed by: PHYSICAL THERAPIST

## 2020-03-13 PROCEDURE — 97012 MECHANICAL TRACTION THERAPY: CPT | Performed by: PHYSICAL THERAPIST

## 2020-03-13 PROCEDURE — 97110 THERAPEUTIC EXERCISES: CPT | Performed by: PHYSICAL THERAPIST

## 2020-03-13 NOTE — PROGRESS NOTES
Physical Therapy Daily Progress Note      Patient: Jayce Gonzalez III   : 1985  Referring practitioner: WHITNEY Kingston  Date of Initial Visit: Type: THERAPY  Noted: 2020  Today's Date: 3/13/2020  Patient seen for 9 sessions    Recheck due: 20  MD appt: 3/19/20  Auth: 8 visits until 3/18/20       Jayce Gonzalez reports: no improvement        Subjective Evaluation    History of Present Illness    Subjective comment: pt states that he is feeling okay today. Traction helped yesterdayPain  Current pain ratin           Objective   See Exercise, Manual, and Modality Logs for complete treatment.   Decrease muscle tone in R UT    Assessment & Plan     Assessment  Assessment details: Pt being put on hold today until MD ann due to lack of progress. Pt educated on continuing HEP while on hold.     Goals  Plan Goals: STG's by 3/5/20  1) Demonstrate independence/compliance in HEP  2) Tolerate 45 min treatment session without increased pain  3) Subjectively report 30% improvement in RUE pain/symptoms  4) Demonstrate improved R elbow ext MMT to 4/5 with minimal pain  5) Demonstrate improved R cervical rotation AROM to 65 deg or greater    LTG's by 3/26/20  1) Subjectively report 50% overall improvement or greater  2) Improve NDI score to 35% or less  3) Demonstrate improved R shoulder flex/abd MMT to 4+/5  4) Demonstrate improved R elbow flex/ext MMT to 4+/5  5) Subjectively report no RUE symptoms distal to elbow  6) Demonstrate improved R  strength to 65# or greater      Plan  Duration in visits: 10  Plan details: Placing pt on hold until MD ann.         Visit Diagnoses:    ICD-10-CM ICD-9-CM   1. Cervicalgia M54.2 723.1   2. Cervical radiculopathy M54.12 723.4   3. Cubital tunnel syndrome on right G56.21 354.2       Progress per Plan of Care and Progress strengthening /stabilization /functional activity           Timed:  Manual Therapy:    8     mins  88570;  Therapeutic Exercise:    37     mins   22852;     Neuromuscular Jeanine:        mins  21895;    Therapeutic Activity:          mins  92016;     Gait Training:           mins  00456;     Ultrasound:          mins  53868;    Electrical Stimulation:         mins  29660 ( );    Untimed:  Electrical Stimulation:         mins  06000 ( );  Mechanical Traction:    15     mins  16605;     Timed Treatment:   45   mins   Total Treatment:     60   mins  Za Cobian [facility]  Physical Therapist

## 2020-03-17 ENCOUNTER — TELEPHONE (OUTPATIENT)
Dept: NEUROSURGERY | Facility: CLINIC | Age: 35
End: 2020-03-17

## 2020-03-17 NOTE — TELEPHONE ENCOUNTER
Tried to reach Jayce to let him know we will have to cancel his follow up appointment on 03/19/2020 due to the Covid 19 Virus, no answer, did leave message on his voice mail.  Left my number if patient wishes to call back.

## 2020-05-19 ENCOUNTER — PREP FOR SURGERY (OUTPATIENT)
Dept: OTHER | Facility: HOSPITAL | Age: 35
End: 2020-05-19

## 2020-05-19 ENCOUNTER — OFFICE VISIT (OUTPATIENT)
Dept: NEUROSURGERY | Facility: CLINIC | Age: 35
End: 2020-05-19

## 2020-05-19 VITALS
DIASTOLIC BLOOD PRESSURE: 80 MMHG | HEIGHT: 71 IN | SYSTOLIC BLOOD PRESSURE: 122 MMHG | BODY MASS INDEX: 29.82 KG/M2 | WEIGHT: 213 LBS

## 2020-05-19 DIAGNOSIS — G56.21 CUBITAL TUNNEL SYNDROME ON RIGHT: ICD-10-CM

## 2020-05-19 DIAGNOSIS — G56.21 CUBITAL TUNNEL SYNDROME ON RIGHT: Primary | ICD-10-CM

## 2020-05-19 DIAGNOSIS — R20.2 NUMBNESS AND TINGLING IN RIGHT HAND: ICD-10-CM

## 2020-05-19 DIAGNOSIS — R20.0 NUMBNESS AND TINGLING IN RIGHT HAND: ICD-10-CM

## 2020-05-19 DIAGNOSIS — Z78.9 NON-SMOKER: ICD-10-CM

## 2020-05-19 DIAGNOSIS — M54.12 CERVICAL RADICULOPATHY: Primary | ICD-10-CM

## 2020-05-19 PROBLEM — J30.9 ALLERGIC RHINITIS: Status: ACTIVE | Noted: 2020-05-19

## 2020-05-19 PROBLEM — R20.9 SKIN SENSATION DISTURBANCE: Status: ACTIVE | Noted: 2020-05-19

## 2020-05-19 PROBLEM — K21.9 GASTROESOPHAGEAL REFLUX DISEASE: Status: ACTIVE | Noted: 2020-05-19

## 2020-05-19 PROCEDURE — 99214 OFFICE O/P EST MOD 30 MIN: CPT | Performed by: NURSE PRACTITIONER

## 2020-05-19 RX ORDER — CEFAZOLIN SODIUM 2 G/50ML
2 SOLUTION INTRAVENOUS ONCE
Status: CANCELLED | OUTPATIENT
Start: 2020-05-19 | End: 2020-05-19

## 2020-05-19 NOTE — PROGRESS NOTES
Chief complaint:   Chief Complaint   Patient presents with   • Neck Pain     Jayce is returning for follow up for his cervical pain, with right arm numbness and pain, he had an EMG/NCV in February, results are in chart.  He also [facility].        Subjective     HPI: This is a 34-year-old male gentleman who we have been following for neck and bilateral arm pain.  We did send him for dedicated course of physical therapy as well as an EMG/NCS of the right upper extremity.  He is here in evaluation today.  He states that he was able to do the physical therapy but did not have any significant improvement in the pain that he is experiencing in his neck.  He continues to have pain in his neck that is constant.  It is worse with activity.  He has pain that radiates from his neck over into his right upper extremity.  He also states that he can have pain that will radiate in particular from his right elbow down to the last 2 digits of his right hand.  He said when this pain occurs it is a sharp shooting pain.  He is left-hand-dominant.  He currently is not working due to COVID-19.  Denies any tobacco, alcohol, or illicit drug use.    Review of Systems   HENT: Positive for facial swelling.    Eyes: Positive for pain and redness.   Respiratory: Positive for apnea.    Musculoskeletal: Positive for back pain, joint swelling, neck pain and neck stiffness.   Neurological: Positive for weakness, numbness and headaches.   Psychiatric/Behavioral: Positive for decreased concentration and sleep disturbance.   All other systems reviewed and are negative.       Past Medical History:   Diagnosis Date   • Arthritis    • GERD (gastroesophageal reflux disease)    • Hypertension      Past Surgical History:   Procedure Laterality Date   • EYE SURGERY     • FRACTURE SURGERY Right     Right arm    • KNEE ARTHROSCOPY Right    • TONSILLECTOMY AND ADENOIDECTOMY       Family History   Problem  "Relation Age of Onset   • Arthritis Mother      Social History     Tobacco Use   • Smoking status: Former Smoker   • Smokeless tobacco: Never Used   Substance Use Topics   • Alcohol use: No     Frequency: Never   • Drug use: Never       (Not in a hospital admission)  Allergies:  Mobic [meloxicam] and Nsaids    Objective      Vital Signs  /80 (BP Location: Right arm, Patient Position: Sitting)   Ht 180.3 cm (71\")   Wt 96.6 kg (213 lb)   BMI 29.71 kg/m²     Physical Exam   Constitutional: He is oriented to person, place, and time. He appears well-developed and well-nourished.   HENT:   Head: Normocephalic.   Eyes: Pupils are equal, round, and reactive to light. Conjunctivae, EOM and lids are normal.   Neck: Normal range of motion.   Cardiovascular: Normal rate, regular rhythm and normal heart sounds.   Pulmonary/Chest: Effort normal and breath sounds normal.   Abdominal: Normal appearance.   Musculoskeletal: Normal range of motion.        Cervical back: He exhibits pain.   Neurological: He is alert and oriented to person, place, and time. He has normal reflexes. He displays normal reflexes. No cranial nerve deficit or sensory deficit. Gait abnormal. GCS eye subscore is 4. GCS verbal subscore is 5. GCS motor subscore is 6.   Muscle wasting noted in the right hand.  Decreased  strength   Skin: Skin is warm.   Psychiatric: He has a normal mood and affect. His speech is normal and behavior is normal. Thought content normal. Cognition and memory are normal.       Neurologic Exam     Mental Status   Oriented to person, place, and time.   Attention: normal. Concentration: normal.   Speech: speech is normal   Level of consciousness: alert  Normal comprehension.     Cranial Nerves   Cranial nerves II through XII intact.     CN III, IV, VI   Pupils are equal, round, and reactive to light.  Extraocular motions are normal.       Results Review: MRI of the cervical spine that was done in November 2018 at the " [facility] [place] shows patient has right-sided spurs at C3-4 and 4-5.        EMG/NCS of the right upper extremity that was done on February 11, 2020 shows the patient does have moderate right ulnar nerve compression and mild right carpal tunnel.        Assessment/Plan: Dr. Gong did review the imaging and EMG results and did come in and discussed this with the patient.  It is felt that the patient would benefit from a right cubital tunnel release to help with the pain that he is experiencing in his arm as well as the numbness and tingling.  Dr. Hardin did go over the risks and benefits of the procedure with the patient.  His questions and concerns were addressed.  It was discussed with the patient that he will have to go for COVID-19 testing prior to the surgery and if it is positive that this would delay him having surgery until he was tested negative.  He acknowledged understanding of this.  Please see Dr. Hardin's addendum to this patient.  Patient is a non-smoker  BMI shows the patient is Overweight. BMI chart was given to the patient.     Jayce was seen today for neck pain.    Diagnoses and all orders for this visit:    Cervical radiculopathy    Cubital tunnel syndrome on right    Numbness and tingling in right hand    Non-smoker    Body mass index (bmi) 29.0-29.9, adult          I discussed the patients findings and my recommendations with patient    Terrance Ayala, WHITNEY  05/19/20  11:27    Attending addendum: Patient presents with a longstanding history of neck pain and some right shoulder pain.  He also complains of right pain about the elbow which then radiates into the right hand involving the fifth and fourth fingers along with numbness and tingling.  He has an MRI of his cervical spine from about 18 months ago which is essentially unremarkable for any compressive lesion.  He has EMG nerve conduction study which is consistent with right cubital tunnel syndrome and a small amount of right carpal  tunnel syndrome.  He does have good beginnings of some clawhand deformity in the fourth and fifth fingers along with the beginnings of some hyperthenar wasting, interosseous wasting.  Clinically he presents very consistently with cubital tunnel syndrome.  I would recommend cubital tunnel release at the right elbow.  The risks and benefits were discussed at length which include but are not limited to infection, bleeding, damage to the ulnar nerve resulting in permanent pain numbness tingling and weakness in the right hand, stroke, coma, and death.  His questions and concerns were addressed.  We will get him prepped and scheduled soon as possible.  I advised the patient that he will require COVID 19 testing prior to surgery and if his testing is positive that would delay or cancel his operation.

## 2020-05-19 NOTE — PATIENT INSTRUCTIONS

## 2020-05-20 DIAGNOSIS — G47.33 OBSTRUCTIVE SLEEP APNEA, ADULT: Primary | ICD-10-CM

## 2020-06-01 ENCOUNTER — TELEPHONE (OUTPATIENT)
Dept: NEUROSURGERY | Facility: CLINIC | Age: 35
End: 2020-06-01

## 2020-06-01 NOTE — TELEPHONE ENCOUNTER
LEFT MESSAGE FOR PT TO GIVE DATES AND TIMES FOR SURGERY AND SURGERY PREWORK.    SURGERY IS SCHEDULED FOR 7/6/20 ARRIVE @ 7:00 AM FOR 9:30 AM SURGERY @ [facility]TR[facility] OF [facility]    PREWORK TESTING ON 6/29/20 @ 10:15 AM AT [facility]TR[facility] OF [facility]. CAN EAT, COFFEE, ETC NO FASTING REQUIRED.     MAILED LTR WITH DATES AND TIMES.     OK FOR HUB TO RELAY THIS INFORMATION

## 2020-07-02 ENCOUNTER — APPOINTMENT (OUTPATIENT)
Dept: PREADMISSION TESTING | Facility: HOSPITAL | Age: 35
End: 2020-07-02

## 2020-07-02 VITALS
HEART RATE: 88 BPM | OXYGEN SATURATION: 97 % | BODY MASS INDEX: 29.97 KG/M2 | WEIGHT: 214.07 LBS | RESPIRATION RATE: 18 BRPM | SYSTOLIC BLOOD PRESSURE: 124 MMHG | HEIGHT: 71 IN | DIASTOLIC BLOOD PRESSURE: 86 MMHG

## 2020-07-02 DIAGNOSIS — G56.21 CUBITAL TUNNEL SYNDROME ON RIGHT: ICD-10-CM

## 2020-07-02 LAB
ALBUMIN SERPL-MCNC: 4.4 G/DL (ref 3.5–5.2)
ALBUMIN/GLOB SERPL: 1.9 G/DL
ALP SERPL-CCNC: 49 U/L (ref 39–117)
ALT SERPL W P-5'-P-CCNC: 31 U/L (ref 1–41)
ANION GAP SERPL CALCULATED.3IONS-SCNC: 11 MMOL/L (ref 5–15)
APTT PPP: 28.1 SECONDS (ref 24.1–35)
AST SERPL-CCNC: 24 U/L (ref 1–40)
BASOPHILS # BLD AUTO: 0.04 10*3/MM3 (ref 0–0.2)
BASOPHILS NFR BLD AUTO: 0.5 % (ref 0–1.5)
BILIRUB SERPL-MCNC: 0.3 MG/DL (ref 0.2–1.2)
BILIRUB UR QL STRIP: NEGATIVE
BUN SERPL-MCNC: 15 MG/DL (ref 6–20)
BUN/CREAT SERPL: 15.8 (ref 7–25)
CALCIUM SPEC-SCNC: 9.4 MG/DL (ref 8.6–10.5)
CHLORIDE SERPL-SCNC: 104 MMOL/L (ref 98–107)
CLARITY UR: CLEAR
CO2 SERPL-SCNC: 26 MMOL/L (ref 22–29)
COLOR UR: YELLOW
CREAT SERPL-MCNC: 0.95 MG/DL (ref 0.76–1.27)
DEPRECATED RDW RBC AUTO: 41.5 FL (ref 37–54)
EOSINOPHIL # BLD AUTO: 0.34 10*3/MM3 (ref 0–0.4)
EOSINOPHIL NFR BLD AUTO: 4.3 % (ref 0.3–6.2)
ERYTHROCYTE [DISTWIDTH] IN BLOOD BY AUTOMATED COUNT: 13.2 % (ref 12.3–15.4)
GFR SERPL CREATININE-BSD FRML MDRD: 91 ML/MIN/1.73
GLOBULIN UR ELPH-MCNC: 2.3 GM/DL
GLUCOSE SERPL-MCNC: 97 MG/DL (ref 65–99)
GLUCOSE UR STRIP-MCNC: NEGATIVE MG/DL
HCT VFR BLD AUTO: 41.9 % (ref 37.5–51)
HGB BLD-MCNC: 14.1 G/DL (ref 13–17.7)
HGB UR QL STRIP.AUTO: NEGATIVE
IMM GRANULOCYTES # BLD AUTO: 0.04 10*3/MM3 (ref 0–0.05)
IMM GRANULOCYTES NFR BLD AUTO: 0.5 % (ref 0–0.5)
INR PPP: 0.93 (ref 0.91–1.09)
KETONES UR QL STRIP: NEGATIVE
LEUKOCYTE ESTERASE UR QL STRIP.AUTO: NEGATIVE
LYMPHOCYTES # BLD AUTO: 2.51 10*3/MM3 (ref 0.7–3.1)
LYMPHOCYTES NFR BLD AUTO: 32 % (ref 19.6–45.3)
MCH RBC QN AUTO: 29.2 PG (ref 26.6–33)
MCHC RBC AUTO-ENTMCNC: 33.7 G/DL (ref 31.5–35.7)
MCV RBC AUTO: 86.7 FL (ref 79–97)
MONOCYTES # BLD AUTO: 0.47 10*3/MM3 (ref 0.1–0.9)
MONOCYTES NFR BLD AUTO: 6 % (ref 5–12)
NEUTROPHILS NFR BLD AUTO: 4.45 10*3/MM3 (ref 1.7–7)
NEUTROPHILS NFR BLD AUTO: 56.7 % (ref 42.7–76)
NITRITE UR QL STRIP: NEGATIVE
NRBC BLD AUTO-RTO: 0 /100 WBC (ref 0–0.2)
PH UR STRIP.AUTO: 5.5 [PH] (ref 5–8)
PLATELET # BLD AUTO: 224 10*3/MM3 (ref 140–450)
PMV BLD AUTO: 9.2 FL (ref 6–12)
POTASSIUM SERPL-SCNC: 3.8 MMOL/L (ref 3.5–5.2)
PROT SERPL-MCNC: 6.7 G/DL (ref 6–8.5)
PROT UR QL STRIP: NEGATIVE
PROTHROMBIN TIME: 12.1 SECONDS (ref 11.9–14.6)
RBC # BLD AUTO: 4.83 10*6/MM3 (ref 4.14–5.8)
SODIUM SERPL-SCNC: 141 MMOL/L (ref 136–145)
SP GR UR STRIP: 1.03 (ref 1–1.03)
UROBILINOGEN UR QL STRIP: NORMAL
WBC # BLD AUTO: 7.85 10*3/MM3 (ref 3.4–10.8)

## 2020-07-02 PROCEDURE — 85025 COMPLETE CBC W/AUTO DIFF WBC: CPT | Performed by: NURSE PRACTITIONER

## 2020-07-02 PROCEDURE — 93010 ELECTROCARDIOGRAM REPORT: CPT | Performed by: INTERNAL MEDICINE

## 2020-07-02 PROCEDURE — 93005 ELECTROCARDIOGRAM TRACING: CPT

## 2020-07-02 PROCEDURE — 80053 COMPREHEN METABOLIC PANEL: CPT | Performed by: NURSE PRACTITIONER

## 2020-07-02 PROCEDURE — 85730 THROMBOPLASTIN TIME PARTIAL: CPT | Performed by: NURSE PRACTITIONER

## 2020-07-02 PROCEDURE — 36415 COLL VENOUS BLD VENIPUNCTURE: CPT

## 2020-07-02 PROCEDURE — 81003 URINALYSIS AUTO W/O SCOPE: CPT | Performed by: NURSE PRACTITIONER

## 2020-07-02 PROCEDURE — 85610 PROTHROMBIN TIME: CPT | Performed by: NURSE PRACTITIONER

## 2020-07-02 RX ORDER — LORATADINE 10 MG/1
10 TABLET ORAL DAILY
COMMUNITY

## 2020-07-02 RX ORDER — ACETAMINOPHEN 500 MG
500 TABLET ORAL EVERY 6 HOURS PRN
COMMUNITY

## 2020-07-02 NOTE — DISCHARGE INSTRUCTIONS
DAY OF SURGERY INSTRUCTIONS        YOUR SURGEON: *** Dr. Hardin    PROCEDURE: ***Right Cubital Tunnel Release-Right    DATE OF SURGERY: *** July 15, 2020    ARRIVAL TIME: AS DIRECTED BY OFFICE    YOU MAY TAKE THE FOLLOWING MEDICATION(S) THE MORNING OF SURGERY WITH A SIP OF WATER: *** as directed by doctor.      ALL OTHER HOME MEDICATION CHECK WITH YOUR PHYSICIAN      DO NOT TAKE ANY ERECTILE DYSFUNCTION MEDICATIONS (EX: CIALIS, VIAGRA) 24 HOURS PRIOR TO SURGERY                      MANAGING PAIN AFTER SURGERY    We know you are probably wondering what your pain will be like after surgery.  Following surgery it is unrealistic to expect you will not have pain.   Pain is how our bodies let us know that something is wrong or cautions us to be careful.  That said, our goal is to make your pain tolerable.    Methods we may use to treat your pain include (oral or IV medications, PCAs, epidurals, nerve blocks, etc.)   While some procedures require IV pain medications for a short time after surgery, transitioning to pain medications by mouth allows for better management of pain.   Your nurse will encourage you to take oral pain medications whenever possible.  IV medications work almost immediately, but only last a short while.  Taking medications by mouth allows for a more constant level of medication in your blood stream for a longer period of time.      Once your pain is out of control it is harder to get back under control.  It is important you are aware when your next dose of pain medication is due.  If you are admitted, your nurse may write the time of your next dose on the white board in your room to help you remember.      We are interested in your pain and encourage you to inform us about aggravating factors during your visit.   Many times a simple repositioning every few hours can make a big difference.    If your physician says it is okay, do not let your pain prevent you from getting out of bed. Be sure to call  your nurse for assistance prior to getting up so you do not fall.      Before surgery, please decide your tolerable pain goal.  These faces help describe the pain ratings we use on a 0-10 scale.   Be prepared to tell us your goal and whether or not you take pain or anxiety medications at home.          BEFORE YOU COME TO THE HOSPITAL  (Pre-op instructions)  • Do not eat, drink, smoke or chew gum after midnight the night before surgery.  This also includes no mints.  • Morning of surgery take only the medicines you have been instructed with a sip of water unless otherwise instructed  by your physician.  • Do not shave, wear makeup or dark nail polish.  • Remove all jewelry including rings.  • Leave anything you consider valuable at home.  • Leave your suitcase in the car until after your surgery.  • Bring the following with you if applicable:  o Picture ID and insurance, Medicare or Medicaid cards  o Co-pay/deductible required by insurance (cash, check, credit card)  o Copy of advance directive, living will or power-of- documents if not brought to PAT  o CPAP or BIPAP mask and tubing  o Relaxation aids ( book, magazine), etc.  o Hearing aids                        ON THE DAY OF SURGERY  · On the day of surgery check in at registration located at the main entrance of the hospital.   ? You will be registered and given a beeper with instructions where to wait in the main lobby.  ? When your beeper lights up and vibrates a member of the Outpatient Surgery staff will meet you at the double doors under the stair steps and escort you to your preoperative room.   · You may have cloth compression devices placed on your legs. These help to prevent blood clots and reduce swelling in your legs.  · An IV may be inserted into one of your veins.  · In the operating room, you may be given one or more of the following:  ? A medicine to help you relax (sedative).  ? A medicine to numb the area (local anesthetic).  ? A medicine  "to make you fall asleep (general anesthetic).  ? A medicine that is injected into an area of your body to numb everything below the injection site (regional anesthetic).  · Your surgical site will be marked or identified.  · You may be given an antibiotic through your IV to help prevent infection.  Contact a health care provider if you:  · Develop a fever of more than 100.4°F (38°C) or other feelings of illness during the 48 hours before your surgery.  · Have symptoms that get worse.  Have questions or concerns about your surgery    General Anesthesia/Surgery, Adult  General anesthesia is the use of medicines to make a person \"go to sleep\" (unconscious) for a medical procedure. General anesthesia must be used for certain procedures, and is often recommended for procedures that:  · Last a long time.  · Require you to be still or in an unusual position.  · Are major and can cause blood loss.  The medicines used for general anesthesia are called general anesthetics. As well as making you unconscious for a certain amount of time, these medicines:  · Prevent pain.  · Control your blood pressure.  · Relax your muscles.  Tell a health care provider about:  · Any allergies you have.  · All medicines you are taking, including vitamins, herbs, eye drops, creams, and over-the-counter medicines.  · Any problems you or family members have had with anesthetic medicines.  · Types of anesthetics you have had in the past.  · Any blood disorders you have.  · Any surgeries you have had.  · Any medical conditions you have.  · Any recent upper respiratory, chest, or ear infections.  · Any history of:  ? Heart or lung conditions, such as heart failure, sleep apnea, asthma, or chronic obstructive pulmonary disease (COPD).  ?  service.  ? Depression or anxiety.  · Any tobacco or drug use, including marijuana or alcohol use.  · Whether you are pregnant or may be pregnant.  What are the risks?  Generally, this is a safe procedure. " However, problems may occur, including:  · Allergic reaction.  · Lung and heart problems.  · Inhaling food or liquid from the stomach into the lungs (aspiration).  · Nerve injury.  · Air in the bloodstream, which can lead to stroke.  · Extreme agitation or confusion (delirium) when you wake up from the anesthetic.  · Waking up during your procedure and being unable to move. This is rare.  These problems are more likely to develop if you are having a major surgery or if you have an advanced or serious medical condition. You can prevent some of these complications by answering all of your health care provider's questions thoroughly and by following all instructions before your procedure.  General anesthesia can cause side effects, including:  · Nausea or vomiting.  · A sore throat from the breathing tube.  · Hoarseness.  · Wheezing or coughing.  · Shaking chills.  · Tiredness.  · Body aches.  · Anxiety.  · Sleepiness or drowsiness.  · Confusion or agitation.  RISKS AND COMPLICATIONS OF SURGERY  Your health care provider will discuss possible risks and complications with you before surgery. Common risks and complications include:    · Problems due to the use of anesthetics.  · Blood loss and replacement (does not apply to minor surgical procedures).  · Temporary increase in pain due to surgery.  · Uncorrected pain or problems that the surgery was meant to correct.  · Infection.  · New damage.    What happens before the procedure?    Medicines  Ask your health care provider about:  · Changing or stopping your regular medicines. This is especially important if you are taking diabetes medicines or blood thinners.  · Taking medicines such as aspirin and ibuprofen. These medicines can thin your blood. Do not take these medicines unless your health care provider tells you to take them.  · Taking over-the-counter medicines, vitamins, herbs, and supplements. Do not take these during the week before your procedure unless your  health care provider approves them.  General instructions  · Starting 3-6 weeks before the procedure, do not use any products that contain nicotine or tobacco, such as cigarettes and e-cigarettes. If you need help quitting, ask your health care provider.  · If you brush your teeth on the morning of the procedure, make sure to spit out all of the toothpaste.  · Tell your health care provider if you become ill or develop a cold, cough, or fever.  · If instructed by your health care provider, bring your sleep apnea device with you on the day of your surgery (if applicable).  · Ask your health care provider if you will be going home the same day, the following day, or after a longer hospital stay.  ? Plan to have someone take you home from the hospital or clinic.  ? Plan to have a responsible adult care for you for at least 24 hours after you leave the hospital or clinic. This is important.  What happens during the procedure?  · You will be given anesthetics through both of the following:  ? A mask placed over your nose and mouth.  ? An IV in one of your veins.  · You may receive a medicine to help you relax (sedative).  · After you are unconscious, a breathing tube may be inserted down your throat to help you breathe. This will be removed before you wake up.  · An anesthesia specialist will stay with you throughout your procedure. He or she will:  ? Keep you comfortable and safe by continuing to give you medicines and adjusting the amount of medicine that you get.  ? Monitor your blood pressure, pulse, and oxygen levels to make sure that the anesthetics do not cause any problems.  The procedure may vary among health care providers and hospitals.  What happens after the procedure?  · Your blood pressure, temperature, heart rate, breathing rate, and blood oxygen level will be monitored until the medicines you were given have worn off.  · You will wake up in a recovery area. You may wake up slowly.  · If you feel anxious  or agitated, you may be given medicine to help you calm down.  · If you will be going home the same day, your health care provider may check to make sure you can walk, drink, and urinate.  · Your health care provider will treat any pain or side effects you have before you go home.  · Do not drive for 24 hours if you were given a sedative.  Summary  · General anesthesia is used to keep you still and prevent pain during a procedure.  · It is important to tell your healthcare provider about your medical history and any surgeries you have had, and previous experience with anesthesia.  · Follow your healthcare provider’s instructions about when to stop eating, drinking, or taking certain medicines before your procedure.  · Plan to have someone take you home from the hospital or clinic.  This information is not intended to replace advice given to you by your health care provider. Make sure you discuss any questions you have with your health care provider.  Document Released: 03/26/2009 Document Revised: 08/03/2018 Document Reviewed: 08/03/2018  [employer] Interactive Patient Education © 2019 [employer]       Fall Prevention in Hospitals, Adult  As a hospital patient, your condition and the treatments you receive can increase your risk for falls. Some additional risk factors for falls in a hospital include:  · Being in an unfamiliar environment.  · Being on bed rest.  · Your surgery.  · Taking certain medicines.  · Your tubing requirements, such as intravenous (IV) therapy or catheters.  It is important that you learn how to decrease fall risks while at the hospital. Below are important tips that can help prevent falls.  SAFETY TIPS FOR PREVENTING FALLS  Talk about your risk of falling.  · Ask your health care provider why you are at risk for falling. Is it your medicine, illness, tubing placement, or something else?  · Make a plan with your health care provider to keep you safe from falls.  · Ask your health care  provider or pharmacist about side effects of your medicines. Some medicines can make you dizzy or affect your coordination.  Ask for help.  · Ask for help before getting out of bed. You may need to press your call button.  · Ask for assistance in getting safely to the toilet.  · Ask for a walker or cane to be put at your bedside. Ask that most of the side rails on your bed be placed up before your health care provider leaves the room.  · Ask family or friends to sit with you.  · Ask for things that are out of your reach, such as your glasses, hearing aids, telephone, bedside table, or call button.  Follow these tips to avoid falling:  · Stay lying or seated, rather than standing, while waiting for help.  · Wear rubber-soled slippers or shoes whenever you walk in the hospital.  · Avoid quick, sudden movements.  ¨ Change positions slowly.  ¨ Sit on the side of your bed before standing.  ¨ Stand up slowly and wait before you start to walk.  · Let your health care provider know if there is a spill on the floor.  · Pay careful attention to the medical equipment, electrical cords, and tubes around you.  · When you need help, use your call button by your bed or in the bathroom. Wait for one of your health care providers to help you.  · If you feel dizzy or unsure of your footing, return to bed and wait for assistance.  · Avoid being distracted by the TV, telephone, or another person in your room.  · Do not lean or support yourself on rolling objects, such as IV poles or bedside tables.     This information is not intended to replace advice given to you by your health care provider. Make sure you discuss any questions you have with your health care provider.     Document Released: 12/15/2001 Document Revised: 01/08/2016 Document Reviewed: 08/25/2013  [employer] Interactive Patient Education ©2016 [employer]       [facility] 4% Patient Instruction Sheet    Chlorhexidine Before Surgery  Chlorhexidine  gluconate (CHG) is a germ-killing (antiseptic) solution that is used to clean the skin. It gets rid of the bacteria that normally live on the skin. Cleaning your skin with CHG before surgery helps lower the risk for infection after surgery.    How to use CHG solution  · You will take 2 showers, one shower the night before surgery, the second shower the morning of surgery before coming to the hospital.  · Use CHG only as told by your health care provider, and follow the instructions on the label.  · Use CHG solution while taking a shower. Follow these steps when using CHG solution (unless your health care provider gives you different instructions):  1. Start the shower.  2. Use your normal soap and shampoo to wash your face and hair.  3. Turn off the shower or move out of the shower stream.  4. Pour the CHG onto a clean washcloth. Do not use any type of brush or rough-edged sponge.  5. Starting at your neck, lather your body down to your toes. Make sure you:  6. Pay special attention to the part of your body where you will be having surgery. Scrub this area for at least 1 minute.  7. Use the full amount of CHG as directed. Usually, this is one half bottle for each shower.  8. Do not use CHG on your head or face. If the solution gets into your ears or eyes, rinse them well with water.  9. Avoid your genital area.  10. Avoid any areas of skin that have broken skin, cuts, or scrapes.  11. Scrub your back and under your arms. Make sure to wash skin folds.  12. Let the lather sit on your skin for 1-2 minutes or as long as told by your health care  provider.  13. Thoroughly rinse your entire body in the shower. Make sure that all body creases and crevices are rinsed well.  14. Dry off with a clean towel. Do not put any substances on your body afterward, such as powder, lotion, or perfume.  15. Put on clean clothes or pajamas.  16. If it is the night before your surgery, sleep in clean sheets.    What are the risks?  Risks  of using CHG include:  · A skin reaction.  · Hearing loss, if CHG gets in your ears.  · Eye injury, if CHG gets in your eyes and is not rinsed out.  · The CHG product catching fire.  Make sure that you avoid smoking and flames after applying CHG to your skin.  Do not use CHG:  · If you have a chlorhexidine allergy or have previously reacted to chlorhexidine.  · On babies younger than 2 months of age.      On the day of surgery, when you are taken to your room in Outpatient Surgery you will be given a CHG prepackaged cloth to wipe the site for your surgery.  How to use CHG prepackaged cloths  · Follow the instructions on the label.  · Use the CHG cloth on clean, dry skin. Follow these steps when using a CHG cloth (unless your health care provider gives you different instructions):  1. Using the CHG cloth, vigorously scrub the part of your body where you will be having surgery. Scrub using a back-and-forth motion for 3 minutes. The area on your body should be completely wet with CHG when you are finished scrubbing.  2. Do not rinse. Discard the cloth and let the area air-dry for 1 minute. Do not put any substances on your body afterward, such as powder, lotion, or perfume.  Contact a health care provider if:  · Your skin gets irritated after scrubbing.  · You have questions about using your solution or cloth.  Get help right away if:  · Your eyes become very red or swollen.  · Your eyes itch badly.  · Your skin itches badly and is red or swollen.  · Your hearing changes.  · You have trouble seeing.  · You have swelling or tingling in your mouth or throat.  · You have trouble breathing.  · You swallow any chlorhexidine.  Summary  · Chlorhexidine gluconate (CHG) is a germ-killing (antiseptic) solution that is used to clean the skin. Cleaning your skin with CHG before surgery helps lower the risk for infection after surgery.  · You may be given CHG to use at home. It may be in a bottle or in a prepackaged cloth to use on  your skin. Carefully follow your health care provider's instructions and the instructions on the product label.  · Do not use CHG if you have a chlorhexidine allergy.  · Contact your health care provider if your skin gets irritated after scrubbing.  This information is not intended to replace advice given to you by your health care provider. Make sure you discuss any questions you have with your health care provider.  Document Released: 09/11/2013 Document Revised: 11/15/2018 Document Reviewed: 11/15/2018  [employer] Interactive Patient Education © 2019 [employer]          PATIENT/FAMILY/RESPONSIBLE PARTY VERBALIZES UNDERSTANDING OF ABOVE EDUCATION.  COPY OF PAIN SCALE GIVEN AND REVIEWED WITH VERBALIZED UNDERSTANDING.

## 2020-07-07 ENCOUNTER — TRANSCRIBE ORDERS (OUTPATIENT)
Dept: ADMINISTRATIVE | Facility: HOSPITAL | Age: 35
End: 2020-07-07

## 2020-07-07 DIAGNOSIS — Z01.818 PRE-OP TESTING: Primary | ICD-10-CM

## 2020-07-13 ENCOUNTER — LAB (OUTPATIENT)
Dept: LAB | Facility: HOSPITAL | Age: 35
End: 2020-07-13

## 2020-07-13 LAB — SARS-COV-2 N GENE RESP QL NAA+PROBE: NOT DETECTED

## 2020-07-13 PROCEDURE — C9803 HOPD COVID-19 SPEC COLLECT: HCPCS | Performed by: NEUROLOGICAL SURGERY

## 2020-07-13 PROCEDURE — 87635 SARS-COV-2 COVID-19 AMP PRB: CPT | Performed by: NEUROLOGICAL SURGERY

## 2020-07-15 ENCOUNTER — HOSPITAL ENCOUNTER (OUTPATIENT)
Facility: HOSPITAL | Age: 35
Setting detail: HOSPITAL OUTPATIENT SURGERY
Discharge: HOME | End: 2020-07-15
Attending: NEUROLOGICAL SURGERY | Admitting: NEUROLOGICAL SURGERY

## 2020-07-15 ENCOUNTER — ANESTHESIA (OUTPATIENT)
Dept: PERIOP | Facility: HOSPITAL | Age: 35
End: 2020-07-15

## 2020-07-15 ENCOUNTER — ANESTHESIA EVENT (OUTPATIENT)
Dept: PERIOP | Facility: HOSPITAL | Age: 35
End: 2020-07-15

## 2020-07-15 VITALS
DIASTOLIC BLOOD PRESSURE: 67 MMHG | RESPIRATION RATE: 18 BRPM | HEART RATE: 65 BPM | SYSTOLIC BLOOD PRESSURE: 110 MMHG | TEMPERATURE: 98.2 F | OXYGEN SATURATION: 95 %

## 2020-07-15 DIAGNOSIS — G56.21 CUBITAL TUNNEL SYNDROME ON RIGHT: ICD-10-CM

## 2020-07-15 LAB
ABO GROUP BLD: NORMAL
BLD GP AB SCN SERPL QL: NEGATIVE
RH BLD: POSITIVE
T&S EXPIRATION DATE: NORMAL

## 2020-07-15 PROCEDURE — 86901 BLOOD TYPING SEROLOGIC RH(D): CPT | Performed by: NURSE PRACTITIONER

## 2020-07-15 PROCEDURE — 64718 REVISE ULNAR NERVE AT ELBOW: CPT | Performed by: NEUROLOGICAL SURGERY

## 2020-07-15 PROCEDURE — 25010000002 PROPOFOL 10 MG/ML EMULSION: Performed by: NURSE ANESTHETIST, CERTIFIED REGISTERED

## 2020-07-15 PROCEDURE — 25010000003 CEFAZOLIN PER 500 MG: Performed by: NEUROLOGICAL SURGERY

## 2020-07-15 PROCEDURE — 25010000002 FENTANYL CITRATE (PF) 100 MCG/2ML SOLUTION: Performed by: NURSE ANESTHETIST, CERTIFIED REGISTERED

## 2020-07-15 PROCEDURE — S0260 H&P FOR SURGERY: HCPCS | Performed by: NEUROLOGICAL SURGERY

## 2020-07-15 PROCEDURE — 86850 RBC ANTIBODY SCREEN: CPT | Performed by: NURSE PRACTITIONER

## 2020-07-15 PROCEDURE — 25010000002 MIDAZOLAM PER 1 MG: Performed by: NURSE ANESTHETIST, CERTIFIED REGISTERED

## 2020-07-15 PROCEDURE — 86900 BLOOD TYPING SEROLOGIC ABO: CPT | Performed by: NURSE PRACTITIONER

## 2020-07-15 RX ORDER — SODIUM CHLORIDE 0.9 % (FLUSH) 0.9 %
3 SYRINGE (ML) INJECTION AS NEEDED
Status: DISCONTINUED | OUTPATIENT
Start: 2020-07-15 | End: 2020-07-15 | Stop reason: HOSPADM

## 2020-07-15 RX ORDER — LIDOCAINE HYDROCHLORIDE 10 MG/ML
0.5 INJECTION, SOLUTION EPIDURAL; INFILTRATION; INTRACAUDAL; PERINEURAL ONCE AS NEEDED
Status: DISCONTINUED | OUTPATIENT
Start: 2020-07-15 | End: 2020-07-15 | Stop reason: HOSPADM

## 2020-07-15 RX ORDER — NALOXONE HCL 0.4 MG/ML
0.4 VIAL (ML) INJECTION AS NEEDED
Status: DISCONTINUED | OUTPATIENT
Start: 2020-07-15 | End: 2020-07-15 | Stop reason: HOSPADM

## 2020-07-15 RX ORDER — BUPIVACAINE HYDROCHLORIDE AND EPINEPHRINE 2.5; 5 MG/ML; UG/ML
INJECTION, SOLUTION EPIDURAL; INFILTRATION; INTRACAUDAL; PERINEURAL AS NEEDED
Status: DISCONTINUED | OUTPATIENT
Start: 2020-07-15 | End: 2020-07-15 | Stop reason: HOSPADM

## 2020-07-15 RX ORDER — HYDROCODONE BITARTRATE AND ACETAMINOPHEN 5; 325 MG/1; MG/1
1 TABLET ORAL EVERY 8 HOURS PRN
Qty: 15 TABLET | Refills: 0 | Status: SHIPPED | OUTPATIENT
Start: 2020-07-15 | End: 2021-09-01

## 2020-07-15 RX ORDER — ONDANSETRON 2 MG/ML
4 INJECTION INTRAMUSCULAR; INTRAVENOUS ONCE AS NEEDED
Status: DISCONTINUED | OUTPATIENT
Start: 2020-07-15 | End: 2020-07-15 | Stop reason: HOSPADM

## 2020-07-15 RX ORDER — LABETALOL HYDROCHLORIDE 5 MG/ML
5 INJECTION, SOLUTION INTRAVENOUS
Status: DISCONTINUED | OUTPATIENT
Start: 2020-07-15 | End: 2020-07-15 | Stop reason: HOSPADM

## 2020-07-15 RX ORDER — MAGNESIUM HYDROXIDE 1200 MG/15ML
LIQUID ORAL AS NEEDED
Status: DISCONTINUED | OUTPATIENT
Start: 2020-07-15 | End: 2020-07-15 | Stop reason: HOSPADM

## 2020-07-15 RX ORDER — SODIUM CHLORIDE 0.9 % (FLUSH) 0.9 %
3 SYRINGE (ML) INJECTION EVERY 12 HOURS SCHEDULED
Status: DISCONTINUED | OUTPATIENT
Start: 2020-07-15 | End: 2020-07-15 | Stop reason: HOSPADM

## 2020-07-15 RX ORDER — SODIUM CHLORIDE, SODIUM LACTATE, POTASSIUM CHLORIDE, CALCIUM CHLORIDE 600; 310; 30; 20 MG/100ML; MG/100ML; MG/100ML; MG/100ML
1000 INJECTION, SOLUTION INTRAVENOUS CONTINUOUS
Status: DISCONTINUED | OUTPATIENT
Start: 2020-07-15 | End: 2020-07-15 | Stop reason: HOSPADM

## 2020-07-15 RX ORDER — FLUMAZENIL 0.1 MG/ML
0.2 INJECTION INTRAVENOUS AS NEEDED
Status: DISCONTINUED | OUTPATIENT
Start: 2020-07-15 | End: 2020-07-15 | Stop reason: HOSPADM

## 2020-07-15 RX ORDER — MIDAZOLAM HYDROCHLORIDE 1 MG/ML
2 INJECTION INTRAMUSCULAR; INTRAVENOUS ONCE
Status: COMPLETED | OUTPATIENT
Start: 2020-07-15 | End: 2020-07-15

## 2020-07-15 RX ORDER — BUPIVACAINE HCL/0.9 % NACL/PF 0.1 %
2 PLASTIC BAG, INJECTION (ML) EPIDURAL ONCE
Status: COMPLETED | OUTPATIENT
Start: 2020-07-15 | End: 2020-07-15

## 2020-07-15 RX ORDER — FENTANYL CITRATE 50 UG/ML
25 INJECTION, SOLUTION INTRAMUSCULAR; INTRAVENOUS
Status: DISCONTINUED | OUTPATIENT
Start: 2020-07-15 | End: 2020-07-15 | Stop reason: HOSPADM

## 2020-07-15 RX ORDER — OXYCODONE AND ACETAMINOPHEN 10; 325 MG/1; MG/1
1 TABLET ORAL ONCE AS NEEDED
Status: DISCONTINUED | OUTPATIENT
Start: 2020-07-15 | End: 2020-07-15 | Stop reason: HOSPADM

## 2020-07-15 RX ORDER — SODIUM CHLORIDE, SODIUM LACTATE, POTASSIUM CHLORIDE, CALCIUM CHLORIDE 600; 310; 30; 20 MG/100ML; MG/100ML; MG/100ML; MG/100ML
100 INJECTION, SOLUTION INTRAVENOUS CONTINUOUS
Status: DISCONTINUED | OUTPATIENT
Start: 2020-07-15 | End: 2020-07-15 | Stop reason: HOSPADM

## 2020-07-15 RX ORDER — OXYCODONE AND ACETAMINOPHEN 7.5; 325 MG/1; MG/1
2 TABLET ORAL EVERY 4 HOURS PRN
Status: DISCONTINUED | OUTPATIENT
Start: 2020-07-15 | End: 2020-07-15 | Stop reason: HOSPADM

## 2020-07-15 RX ORDER — FENTANYL CITRATE 50 UG/ML
INJECTION, SOLUTION INTRAMUSCULAR; INTRAVENOUS AS NEEDED
Status: DISCONTINUED | OUTPATIENT
Start: 2020-07-15 | End: 2020-07-15 | Stop reason: SURG

## 2020-07-15 RX ORDER — PROPOFOL 10 MG/ML
VIAL (ML) INTRAVENOUS AS NEEDED
Status: DISCONTINUED | OUTPATIENT
Start: 2020-07-15 | End: 2020-07-15 | Stop reason: SURG

## 2020-07-15 RX ORDER — SODIUM CHLORIDE 0.9 % (FLUSH) 0.9 %
3-10 SYRINGE (ML) INJECTION AS NEEDED
Status: DISCONTINUED | OUTPATIENT
Start: 2020-07-15 | End: 2020-07-15 | Stop reason: HOSPADM

## 2020-07-15 RX ADMIN — SODIUM CHLORIDE, POTASSIUM CHLORIDE, SODIUM LACTATE AND CALCIUM CHLORIDE 1000 ML: 600; 310; 30; 20 INJECTION, SOLUTION INTRAVENOUS at 12:12

## 2020-07-15 RX ADMIN — Medication 2 G: at 12:51

## 2020-07-15 RX ADMIN — PROPOFOL 200 MG: 10 INJECTION, EMULSION INTRAVENOUS at 12:46

## 2020-07-15 RX ADMIN — FENTANYL CITRATE 25 MCG: 50 INJECTION, SOLUTION INTRAMUSCULAR; INTRAVENOUS at 14:15

## 2020-07-15 RX ADMIN — FENTANYL CITRATE 100 MCG: 50 INJECTION, SOLUTION INTRAMUSCULAR; INTRAVENOUS at 12:46

## 2020-07-15 RX ADMIN — FENTANYL CITRATE 25 MCG: 50 INJECTION, SOLUTION INTRAMUSCULAR; INTRAVENOUS at 14:10

## 2020-07-15 RX ADMIN — MIDAZOLAM 2 MG: 1 INJECTION INTRAMUSCULAR; INTRAVENOUS at 12:35

## 2020-07-15 RX ADMIN — OXYCODONE HYDROCHLORIDE AND ACETAMINOPHEN 2 TABLET: 7.5; 325 TABLET ORAL at 14:08

## 2020-07-15 RX ADMIN — FENTANYL CITRATE 25 MCG: 50 INJECTION, SOLUTION INTRAMUSCULAR; INTRAVENOUS at 14:05

## 2020-07-15 RX ADMIN — LIDOCAINE HYDROCHLORIDE 100 MG: 20 INJECTION, SOLUTION INTRAVENOUS at 12:46

## 2020-07-15 NOTE — ANESTHESIA POSTPROCEDURE EVALUATION
Patient: Jayce Gonzalez III    Procedure Summary     Date:  07/15/20 Room / Location:   [facility] OR  /  [facility]    Anesthesia Start:  1244 Anesthesia Stop:  1344    Procedure:  right CUBITAL TUNNEL RELEASE (Right Elbow) Diagnosis:       Cubital tunnel syndrome on right      (Cubital tunnel syndrome on right [G56.21])    Surgeon:  Jean-Paul Hardin MD Provider:  Geovanny Metcalf CRNA    Anesthesia Type:  general ASA Status:  2          Anesthesia Type: general    Vitals  Vitals Value Taken Time   /65 7/15/2020  1:53 PM   Temp 97.7 °F (36.5 °C) 7/15/2020  1:43 PM   Pulse 80 7/15/2020  1:53 PM   Resp 14 7/15/2020  1:53 PM   SpO2 99 % 7/15/2020  1:53 PM           Post Anesthesia Care and Evaluation    Patient location during evaluation: PACU  Patient participation: complete - patient participated  Level of consciousness: awake and alert  Pain management: adequate  Airway patency: patent  Anesthetic complications: No anesthetic complications  PONV Status: controlled  Cardiovascular status: acceptable and hemodynamically stable  Respiratory status: acceptable  Hydration status: acceptable    Comments: Patient discharged from PACU prior to anesthesia evaluation based on En Score.  For details, see RN note.     /74   Pulse 62   Temp 98.2 °F (36.8 °C) (Temporal)   Resp 18   SpO2 96%

## 2020-07-15 NOTE — ANESTHESIA PROCEDURE NOTES
Airway  Urgency: elective    Date/Time: 7/15/2020 12:48 PM  Airway not difficult    General Information and Staff    Patient location during procedure: OR    Indications and Patient Condition  Indications for airway management: airway protection    Preoxygenated: yes  MILS maintained throughout  Mask difficulty assessment: 1 - vent by mask    Final Airway Details  Final airway type: supraglottic airway      Successful airway: classic and I-gel  Size 4    Number of attempts at approach: 1  Assessment: lips, teeth, and gum same as pre-op and atraumatic intubation

## 2020-07-15 NOTE — OP NOTE
Procedure Note  Preop Diagnosis: Cubital tunnel syndrome on right [G56.21]    Post-Op Diagnosis Codes:     * Cubital tunnel syndrome on right [G56.21]     Procedure Name: Cubital Tunnel Release    Indications:  A EMG revealed findings of right cubital tunnel syndrome. The patient now presents for right cubital tunnel release after discussing therapeutic alternatives.          Surgeon: Jean-Paul Hardin MD     Assistants: none    Anesthesia: General LMA anesthesia    ASA Class: 3    Procedure Details   After obtaining informed consent, having the risks and benefits of the procedure explained including but not limited to infection, bleeding, damage to the ulnar or median nerve, permanent pain numbness tingling and paralysis on the hand, stroke, coma, and death.  The patient was brought to the operating.  The patient was given general anesthesia via an endotracheal tube.  The right upper extremity preplan incisions in the palm and right elbow were marked with indelible marker.  The patient was then prepped and draped in a standard sterile fashion.  The right medial portion of the elbow at the medial epicondyle was injected with Marcaine and epinephrine.  A 10 blade scalpel was used to make an incision to the dermis and epidermis.  Bipolar cautery was used for hemostasis.  Tenotomy scissors were then used to dissect through the connective tissues bluntly and sharply until the ulnar nerve was identified in the cubital tunnel.  The ulnar nerve was then decompressed both proximally and distally using tenotomy scissors.  It was circumferentially released from the surrounding connective tissues.  Once we were satisfied that the ulnar nerve had been adequately decompressed the wound was copiously irrigated with an antibiotic solution was inspected for hemostasis.  The subcutaneous tissues were closed using a series of inverted interrupted 3-0 Vicryl sutures and the skin was closed using a running 4-0 Monocryl.  All sponge  needle and instrument counts were correct at the end of the procedure.  Patient was extubated in stable condition returned recovery with about 20 mL of blood loss.          Findings:  Right Cubital tunnel syndrome    Estimated Blood Loss:  20           Drains: None           Total IV Fluids: ml           Specimens: None           Implants: * No implants in log *           Complications:  none           Disposition: PACU - hemodynamically stable.           Condition: stable        Jean-Paul Hardin MD

## 2020-07-15 NOTE — ANESTHESIA PREPROCEDURE EVALUATION
Anesthesia Evaluation     Patient summary reviewed and Nursing notes reviewed   NPO Solid Status: > 8 hours  NPO Liquid Status: > 8 hours           Airway   Mallampati: II  No difficulty expected  Dental          Pulmonary    (+) sleep apnea,   (-) COPD, not a smoker  Cardiovascular   Exercise tolerance: good (4-7 METS)    (+) hypertension,   (-) past MI, CAD      Neuro/Psych- negative ROS  GI/Hepatic/Renal/Endo    (+)  GERD,    (-) liver disease, no renal disease, diabetes    Musculoskeletal (-) negative ROS    Abdominal    Substance History - negative use     OB/GYN negative ob/gyn ROS         Other                        Anesthesia Plan    ASA 2     general     intravenous induction     Anesthetic plan, all risks, benefits, and alternatives have been provided, discussed and informed consent has been obtained with: patient.      
no

## 2020-07-15 NOTE — DISCHARGE INSTRUCTIONS
YOUR NEXT PAIN MEDICATION IS DUE AT 6:10 PM        General Anesthesia, Adult, Care After  Refer to this sheet in the next few weeks. These instructions provide you with information on caring for yourself after your procedure. Your health care provider may also give you more specific instructions. Your treatment has been planned according to current medical practices, but problems sometimes occur. Call your health care provider if you have any problems or questions after your procedure.  WHAT TO EXPECT AFTER THE PROCEDURE  After the procedure, it is typical to experience:  · Sleepiness.  · Nausea and vomiting.  HOME CARE INSTRUCTIONS  · For the first 24 hours after general anesthesia:  ¨ Have a responsible person with you.  ¨ Do not drive a car. If you are alone, do not take public transportation.  ¨ Do not drink alcohol.  ¨ Do not take medicine that has not been prescribed by your health care provider.  ¨ Do not sign important papers or make important decisions.  ¨ You may resume a normal diet and activities as directed by your health care provider.  · Change bandages (dressings) as directed.  · If you have questions or problems that seem related to general anesthesia, call the hospital and ask for the anesthetist or anesthesiologist on call.  SEEK MEDICAL CARE IF:  · You have nausea and vomiting that continue the day after anesthesia.  · You develop a rash.  SEEK IMMEDIATE MEDICAL CARE IF:    · You have difficulty breathing.  · You have chest pain.  · You have any allergic problems.     This information is not intended to replace advice given to you by your health care provider. Make sure you discuss any questions you have with your health care provider.     Document Released: 03/26/2002 Document Revised: 01/08/2016 Document Reviewed: 07/03/2014  [employer] Interactive Patient Education ©2016 [employer]    CALL YOUR PHYSICIAN IF YOU EXPERIENCE  INCREASED PAIN NOT HELPED BY YOUR PAIN MEDICATION.    .                                               Fall Prevention in the Home      Falls can cause injuries. They can happen to people of all ages. There are many things you can do to make your home safe and to help prevent falls.    WHAT CAN I DO ON THE OUTSIDE OF MY HOME?  · Regularly fix the edges of walkways and driveways and fix any cracks.  · Remove anything that might make you trip as you walk through a door, such as a raised step or threshold.  · Trim any bushes or trees on the path to your home.  · Use bright outdoor lighting.  · Clear any walking paths of anything that might make someone trip, such as rocks or tools.  · Regularly check to see if handrails are loose or broken. Make sure that both sides of any steps have handrails.  · Any raised decks and porches should have guardrails on the edges.  · Have any leaves, snow, or ice cleared regularly.  · Use sand or salt on walking paths during winter.  · Clean up any spills in your garage right away. This includes oil or grease spills.  WHAT CAN I DO IN THE BATHROOM?    · Use night lights.  · Install grab bars by the toilet and in the tub and shower. Do not use towel bars as grab bars.  · Use non-skid mats or decals in the tub or shower.  · If you need to sit down in the shower, use a plastic, non-slip stool.  · Keep the floor dry. Clean up any water that spills on the floor as soon as it happens.  · Remove soap buildup in the tub or shower regularly.  · Attach bath mats securely with double-sided non-slip rug tape.  · Do not have throw rugs and other things on the floor that can make you trip.  WHAT CAN I DO IN THE BEDROOM?  · Use night lights.  · Make sure that you have a light by your bed that is easy to reach.  · Do not use any sheets or blankets that are too big for your bed. They should not hang down onto the floor.  · Have a firm chair that has side arms. You can use this for support while you get dressed.  · Do not have throw rugs and other things on the floor that can  make you trip.  WHAT CAN I DO IN THE KITCHEN?  · Clean up any spills right away.  · Avoid walking on wet floors.  · Keep items that you use a lot in easy-to-reach places.  · If you need to reach something above you, use a strong step stool that has a grab bar.  · Keep electrical cords out of the way.  · Do not use floor polish or wax that makes floors slippery. If you must use wax, use non-skid floor wax.  · Do not have throw rugs and other things on the floor that can make you trip.  WHAT CAN I DO WITH MY STAIRS?  · Do not leave any items on the stairs.  · Make sure that there are handrails on both sides of the stairs and use them. Fix handrails that are broken or loose. Make sure that handrails are as long as the stairways.  · Check any carpeting to make sure that it is firmly attached to the stairs. Fix any carpet that is loose or worn.  · Avoid having throw rugs at the top or bottom of the stairs. If you do have throw rugs, attach them to the floor with carpet tape.  · Make sure that you have a light switch at the top of the stairs and the bottom of the stairs. If you do not have them, ask someone to add them for you.  WHAT ELSE CAN I DO TO HELP PREVENT FALLS?  · Wear shoes that:  ¨ Do not have high heels.  ¨ Have rubber bottoms.  ¨ Are comfortable and fit you well.  ¨ Are closed at the toe. Do not wear sandals.  · If you use a stepladder:  ¨ Make sure that it is fully opened. Do not climb a closed stepladder.  ¨ Make sure that both sides of the stepladder are locked into place.  ¨ Ask someone to hold it for you, if possible.  · Clearly lourdes and make sure that you can see:  ¨ Any grab bars or handrails.  ¨ First and last steps.  ¨ Where the edge of each step is.  · Use tools that help you move around (mobility aids) if they are needed. These include:  ¨ Canes.  ¨ Walkers.  ¨ Scooters.  ¨ Crutches.  · Turn on the lights when you go into a dark area. Replace any light bulbs as soon as they burn out.  · Set up your  furniture so you have a clear path. Avoid moving your furniture around.  · If any of your floors are uneven, fix them.  · If there are any pets around you, be aware of where they are.  · Review your medicines with your doctor. Some medicines can make you feel dizzy. This can increase your chance of falling.  Ask your doctor what other things that you can do to help prevent falls.     This information is not intended to replace advice given to you by your health care provider. Make sure you discuss any questions you have with your health care provider.     Document Released: 10/14/2010 Document Revised: 05/03/2016 Document Reviewed: 01/22/2016  [employer] Interactive Patient Education ©2016 [employer]     PATIENT/FAMILY/RESPONSIBLE PARTY VERBALIZES UNDERSTANDING OF ABOVE EDUCATION.  COPY OF PAIN SCALE GIVEN AND REVIEWED WITH VERBALIZED UNDERSTANDING.

## 2020-07-15 NOTE — H&P
Chief Complaint   Patient presents with   • Neck Pain       Jayce is returning for follow up for his cervical pain, with right arm numbness and pain, he had an EMG/NCV in February, results are in chart.  He also [facility] for [facility].             Subjective         HPI: This is a 34-year-old male gentleman who we have been following for neck and bilateral arm pain.  We did send him for dedicated course of physical therapy as well as an EMG/NCS of the right upper extremity.  He is here in evaluation today.  He states that he was able to do the physical therapy but did not have any significant improvement in the pain that he is experiencing in his neck.  He continues to have pain in his neck that is constant.  It is worse with activity.  He has pain that radiates from his neck over into his right upper extremity.  He also states that he can have pain that will radiate in particular from his right elbow down to the last 2 digits of his right hand.  He said when this pain occurs it is a sharp shooting pain.  He is left-hand-dominant.  He currently is not working due to COVID[employer].  Denies any tobacco, alcohol, or illicit drug use.     Review of Systems   HENT: Positive for facial swelling.    Eyes: Positive for pain and redness.   Respiratory: Positive for apnea.    Musculoskeletal: Positive for back pain, joint swelling, neck pain and neck stiffness.   Neurological: Positive for weakness, numbness and headaches.   Psychiatric/Behavioral: Positive for decreased concentration and sleep disturbance.   All other systems reviewed and are negative.        Medical History        Past Medical History:   Diagnosis Date   • Arthritis     • GERD (gastroesophageal reflux disease)     • Hypertension           Surgical History         Past Surgical History:   Procedure Laterality Date   • EYE SURGERY       • FRACTURE SURGERY Right       Right arm    • KNEE ARTHROSCOPY Right     • TONSILLECTOMY  "AND ADENOIDECTOMY                   Family History   Problem Relation Age of Onset   • Arthritis Mother        Social History      Tobacco Use   • Smoking status: Former Smoker   • Smokeless tobacco: Never Used   Substance Use Topics   • Alcohol use: No       Frequency: Never   • Drug use: Never        Prescriptions Prior to Admission      (Not in a hospital admission)     Allergies:  Mobic [meloxicam] and Nsaids        Objective          Vital Signs  /80 (BP Location: Right arm, Patient Position: Sitting)   Ht 180.3 cm (71\")   Wt 96.6 kg (213 lb)   BMI 29.71 kg/m²      Physical Exam   Constitutional: He is oriented to person, place, and time. He appears well-developed and well-nourished.   HENT:   Head: Normocephalic.   Eyes: Pupils are equal, round, and reactive to light. Conjunctivae, EOM and lids are normal.   Neck: Normal range of motion.   Cardiovascular: Normal rate, regular rhythm and normal heart sounds.   Pulmonary/Chest: Effort normal and breath sounds normal.   Abdominal: Normal appearance.   Musculoskeletal: Normal range of motion.        Cervical back: He exhibits pain.   Neurological: He is alert and oriented to person, place, and time. He has normal reflexes. He displays normal reflexes. No cranial nerve deficit or sensory deficit. Gait abnormal. GCS eye subscore is 4. GCS verbal subscore is 5. GCS motor subscore is 6.   Muscle wasting noted in the right hand.  Decreased  strength   Skin: Skin is warm.   Psychiatric: He has a normal mood and affect. His speech is normal and behavior is normal. Thought content normal. Cognition and memory are normal.         Neurologic Exam      Mental Status   Oriented to person, place, and time.   Attention: normal. Concentration: normal.   Speech: speech is normal   Level of consciousness: alert  Normal comprehension.      Cranial Nerves   Cranial nerves II through XII intact.      CN III, IV, VI   Pupils are equal, round, and reactive to " light.  Extraocular motions are normal.         Results Review: MRI of the cervical spine that was done in November 2018 at the [facility] shows patient has right-sided spurs at C3-4 and 4-5.          EMG/NCS of the right upper extremity that was done on February 11, 2020 shows the patient does have moderate right ulnar nerve compression and mild right carpal tunnel.          Assessment/Plan: Dr. Gong did review the imaging and EMG results and did come in and discussed this with the patient.  It is felt that the patient would benefit from a right cubital tunnel release to help with the pain that he is experiencing in his arm as well as the numbness and tingling.  Dr. Hardin did go over the risks and benefits of the procedure with the patient.  His questions and concerns were addressed.  It was discussed with the patient that he will have to go for COVID-19 testing prior to the surgery and if it is positive that this would delay him having surgery until he was tested negative.  He acknowledged understanding of this.  Please see Dr. Hardin's addendum to this patient.  Patient is a non-smoker  BMI shows the patient is Overweight. BMI chart was given to the patient.      Jayce was seen today for neck pain.     Diagnoses and all orders for this visit:     Cervical radiculopathy     Cubital tunnel syndrome on right     Numbness and tingling in right hand     Non-smoker     Body mass index (bmi) 29.0-29.9, adult            I discussed the patients findings and my recommendations with patient     Terrance EUN Ayala, APRN  05/19/20  11:27     Attending addendum: Patient presents with a longstanding history of neck pain and some right shoulder pain.  He also complains of right pain about the elbow which then radiates into the right hand involving the fifth and fourth fingers along with numbness and tingling.  He has an MRI of his cervical spine from about 18 months ago which is essentially unremarkable for any  compressive lesion.  He has EMG nerve conduction study which is consistent with right cubital tunnel syndrome and a small amount of right carpal tunnel syndrome.  He does have good beginnings of some clawhand deformity in the fourth and fifth fingers along with the beginnings of some hyperthenar wasting, interosseous wasting.  Clinically he presents very consistently with cubital tunnel syndrome.  I would recommend cubital tunnel release at the right elbow.  The risks and benefits were discussed at length which include but are not limited to infection, bleeding, damage to the ulnar nerve resulting in permanent pain numbness tingling and weakness in the right hand, stroke, coma, and death.  His questions and concerns were addressed.  We will get him prepped and scheduled soon as possible.  I advised the patient that he will require COVID 19 testing prior to surgery and if his testing is positive that would delay or cancel his operation.

## 2020-07-21 ENCOUNTER — TELEPHONE (OUTPATIENT)
Dept: NEUROSURGERY | Facility: CLINIC | Age: 35
End: 2020-07-21

## 2020-07-21 NOTE — TELEPHONE ENCOUNTER
Patient left a voicemail stating he is having blisters come up around his surgical site.  He says he removed the bandage but continues to have blisters come up and some of them have already opened up and drained.  He is concerned about whether or not he needs to be seen.    Will forward this to Terrance zhong CMA

## 2020-07-22 ENCOUNTER — OFFICE VISIT (OUTPATIENT)
Dept: NEUROSURGERY | Facility: CLINIC | Age: 35
End: 2020-07-22

## 2020-07-22 VITALS
SYSTOLIC BLOOD PRESSURE: 110 MMHG | BODY MASS INDEX: 29.54 KG/M2 | DIASTOLIC BLOOD PRESSURE: 70 MMHG | WEIGHT: 211 LBS | HEIGHT: 71 IN

## 2020-07-22 DIAGNOSIS — Z78.9 NON-SMOKER: ICD-10-CM

## 2020-07-22 DIAGNOSIS — G56.21 CUBITAL TUNNEL SYNDROME ON RIGHT: Primary | ICD-10-CM

## 2020-07-22 PROCEDURE — 99024 POSTOP FOLLOW-UP VISIT: CPT | Performed by: NURSE PRACTITIONER

## 2020-07-22 NOTE — PATIENT INSTRUCTIONS

## 2020-07-22 NOTE — PROGRESS NOTES
"  Chief complaint:   Chief Complaint   Patient presents with   • Post-op     Jayce is returning today for  wound check after a call to the office with concerns about blisters around his incision site.         Subjective     HPI: This is a 34 y.o. male patient who went to the operating room on 7/15/2020 for a right CUBITAL TUNNEL RELEASE - Right. The patient is here in follow up today for postoperative visit.  Patient was concerned because he saw some blisters come up close to his incision and wanted to make sure that there was nothing like an allergic reaction happening at this time.  He does still have the numbness but he says it may be improving to a degree.          Review of Systems   Skin:        Mild abrasion in the biceps region         Objective      Vital Signs  /70 (BP Location: Right arm, Patient Position: Sitting)   Ht 180.3 cm (71\")   Wt 95.7 kg (211 lb)   BMI 29.43 kg/m²     Physical Exam   Constitutional: He is oriented to person, place, and time. He appears well-developed and well-nourished.   HENT:   Head: Normocephalic.   Eyes: Pupils are equal, round, and reactive to light. EOM are normal.   Neck: Normal range of motion.   Pulmonary/Chest: Effort normal.   Musculoskeletal: Normal range of motion.   Neurological: He is alert and oriented to person, place, and time. He has normal strength and normal reflexes. No cranial nerve deficit or sensory deficit. Gait normal. GCS eye subscore is 4. GCS verbal subscore is 5. GCS motor subscore is 6.   Skin: Skin is warm.   Psychiatric: He has a normal mood and affect. His speech is normal and behavior is normal. Thought content normal.     Incisions clean dry and intact    Neurologic Exam     Mental Status   Oriented to person, place, and time.   Speech: speech is normal     Cranial Nerves     CN III, IV, VI   Pupils are equal, round, and reactive to light.  Extraocular motions are normal.     Motor Exam     Strength   Strength 5/5 throughout. "       Results Review: No new imaging          Assessment/Plan: At this point I do not see any concern for any kind of incision breakdown nor did there appear to be any kind of allergic reaction along the incision.  I did remove the Steri-Strips.  There was an area along the biceps region that looked like it had been rubbed a little raw but this was far away from the incision.  I did redress the incision with Kerlix and Coban.  He is can continue with restrictions for his arm at this time.  He was told to call us if he had any further problems.  He will keep his appointment with me in a couple weeks.    Patient is a non-smoker  BMI shows the patient is Overweight. BMI chart was given to the patient.     Jayce was seen today for post-op.    Diagnoses and all orders for this visit:    Cubital tunnel syndrome on right    Body mass index (bmi) 29.0-29.9, adult    Non-smoker        I discussed the patients findings and my recommendations with patient  Terrance Ayala, WHITNEY  07/22/20  12:11

## 2020-08-04 ENCOUNTER — OFFICE VISIT (OUTPATIENT)
Dept: NEUROSURGERY | Facility: CLINIC | Age: 35
End: 2020-08-04

## 2020-08-04 VITALS
SYSTOLIC BLOOD PRESSURE: 110 MMHG | DIASTOLIC BLOOD PRESSURE: 78 MMHG | BODY MASS INDEX: 30.13 KG/M2 | HEIGHT: 71 IN | WEIGHT: 215.2 LBS

## 2020-08-04 DIAGNOSIS — G56.21 CUBITAL TUNNEL SYNDROME ON RIGHT: Primary | ICD-10-CM

## 2020-08-04 DIAGNOSIS — Z78.9 NON-SMOKER: ICD-10-CM

## 2020-08-04 PROCEDURE — 99024 POSTOP FOLLOW-UP VISIT: CPT | Performed by: NURSE PRACTITIONER

## 2020-08-04 NOTE — PROGRESS NOTES
"  Chief complaint:   Chief Complaint   Patient presents with   • Post-op     Jayce is returning for a post op visit for a 07/15/2020 cubital tunnel on the right.         Subjective     HPI: This is a 34 y.o. male patient who went to the operating room on 7/15/2020 for a right CUBITAL TUNNEL RELEASE - Right. The patient is here in follow up today for postoperative visit.  He states that overall he does feel like the surgery has been helping improve the pain and the numbness and tingling that he was having in his right upper extremity going from the elbow down to the last 2 digits of his hand.  He says that he feels like he is about 70% improved compared to what he was before surgery.  He rates his pain on a scale of 0-2.  He does feel like he does still have weakness in the arm but he does like this is gradually improving at this time.        Review of Systems   Musculoskeletal: Positive for myalgias.   Neurological: Positive for numbness.         Objective      Vital Signs  /78 (BP Location: Right arm, Patient Position: Sitting)   Ht 180.3 cm (71\")   Wt 97.6 kg (215 lb 3.2 oz)   BMI 30.01 kg/m²     Physical Exam   Constitutional: He is oriented to person, place, and time. He appears well-developed and well-nourished.   HENT:   Head: Normocephalic.   Eyes: Pupils are equal, round, and reactive to light. EOM are normal.   Neck: Normal range of motion.   Pulmonary/Chest: Effort normal.   Musculoskeletal: Normal range of motion.   Neurological: He is alert and oriented to person, place, and time. He has normal strength and normal reflexes. No cranial nerve deficit or sensory deficit. Gait normal. GCS eye subscore is 4. GCS verbal subscore is 5. GCS motor subscore is 6.   Skin: Skin is warm.   Psychiatric: He has a normal mood and affect. His speech is normal and behavior is normal. Thought content normal.     Incisions clean dry and intact    Neurologic Exam     Mental Status   Oriented to person, place, and " time.   Speech: speech is normal     Cranial Nerves     CN III, IV, VI   Pupils are equal, round, and reactive to light.  Extraocular motions are normal.     Motor Exam     Strength   Strength 5/5 throughout.       Results Review: No new imaging          Assessment/Plan: The patient can start doing light activities with his right arm.  He was told that he needs to get to 1 month out from surgery for starts doing any kind of increased activity with the arm that would be strenuous.  He acknowledged understanding of this.  His questions and concerns were addressed.    Patient is a non-smoker  BMI shows the patient is Obese. BMI chart was given to the patient.     Jayce was seen today for post-op.    Diagnoses and all orders for this visit:    Cubital tunnel syndrome on right    Non-smoker    Body mass index (BMI) of 30.0 to 30.9 in adult        I discussed the patients findings and my recommendations with patient  Terrance Ayala, WHITNEY  08/04/20  14:48

## 2020-08-04 NOTE — PATIENT INSTRUCTIONS

## 2020-08-13 ENCOUNTER — OFFICE VISIT (OUTPATIENT)
Dept: SLEEP MEDICINE | Facility: HOSPITAL | Age: 35
End: 2020-08-13

## 2020-08-13 DIAGNOSIS — G47.33 OBSTRUCTIVE SLEEP APNEA, ADULT: Primary | ICD-10-CM

## 2020-08-13 PROCEDURE — 99441 PR PHYS/QHP TELEPHONE EVALUATION 5-10 MIN: CPT | Performed by: NURSE PRACTITIONER

## 2020-08-13 NOTE — PROGRESS NOTES
Sleep Clinic Follow Up      You have chosen to receive care through a telephone visit. Do you consent to use a telephone visit for your medical care today? Yes    Date: 2020  Primary Care Physician: Nasra Eduardo MD    Last office visit: 05/10/2019 (I reviewed this note)    CC: Follow up: PRASANTH on CPAP      Interim History:  Since the last visit:    1) mild PRASANTH -  Jayce BARNHART Cullman III has reportedly remained compliant with CPAP. He denies mask and machine issues, dry mouth, headaches, or pressures intolerance. He denies abnormal dreams, sleep paralysis, nasal congestion, URI sx.    Sleep Testin. HST on 2019, AHI of 9.9   2. Currently on 5-20 cm H2O    PAP Data:    Data unavailable  Mask type: Nasal mask (cushion)  DME: [facility]    Bed time:   Sleep latency: 10-20 minutes  Number of times awakens during the night: 5  Wake time: [id number]  Estimated total sleep time at night: 6-7 hours  Caffeine intake: 0 cups of coffee, 0 cups of tea, and 0 sodas per day  Alcohol intake: 0 drinks per week  Nap time: rare   Sleepiness with Driving: none       2) Patient reports rare RLS symptoms.     PMHx, FH, SH reviewed and pertinent changes are: Pinched nerve release in right arm.       REVIEW OF SYSTEMS:   Negative for chest pain, SOA, fever, chills, cough, N/V/D, abdominal pain.    Smoking:none      Exam:  Unable to perform physical exam due to conducting telephone visit    Past Medical History:   Diagnosis Date   • Arthritis    • GERD (gastroesophageal reflux disease)    • Hypertension        Current Outpatient Medications:   •  acetaminophen (TYLENOL) 500 MG tablet, Take 500 mg by mouth Every 6 (Six) Hours As Needed for Mild Pain ., Disp: , Rfl:   •  albuterol sulfate  (90 Base) MCG/ACT inhaler, Inhale., Disp: , Rfl:   •  cyclobenzaprine (FLEXERIL) 10 MG tablet, cyclobenzaprine 10 mg tablet, Disp: , Rfl:   •  HYDROcodone-acetaminophen (NORCO) 5-325 MG per tablet, Take 1 tablet by mouth Every 8 (Eight) Hours  As Needed for Moderate Pain ., Disp: 15 tablet, Rfl: 0  •  loratadine (Claritin) 10 MG tablet, Take 10 mg by mouth Daily., Disp: , Rfl:   •  omeprazole (priLOSEC) 20 MG capsule, TAKE 1 CAPSULE BY MOUTH ONCE DAILY FOR 90 DAYS, Disp: , Rfl:       Assessment and Plan:    1. Obstructive sleep apnea Established, stable (1)  1. Reportedly compliant with PAP therapy - obtain compliance report  2. Continue PAP as prescribed  3. Script for PAP supplies  4. Return to clinic in 1 year with compliance report unless change in symptoms in interim period  2. Restless leg syndrome/ Forrest-Ekbom disease (RLS/WED) self-limited or minor problem      This visit has been rescheduled as a phone visit to comply with patient safety concerns in accordance with CDC recommendations. Total time of discussion was 6 minutes.    4 of 6 minutes spent telephone counseling patient extensively regarding:   PAP therapy, PAP compliance and PAP maintenance      RTC in 12 months. Patient agrees to return sooner if changes in symptoms.        This document has been electronically signed by WHITNEY Mares on August 13, 2020 14:18          CC: Nasra Eduardo MD          No ref. provider found

## 2020-08-17 ENCOUNTER — DOCUMENTATION (OUTPATIENT)
Dept: SLEEP MEDICINE | Facility: HOSPITAL | Age: 35
End: 2020-08-17

## 2020-08-17 NOTE — PROGRESS NOTES
PAP Data:    Time frame: 08/14/2019-08/12/2020   Compliance 100 %  Average use on days used: 8 hrs 33 min  Percent of days with usage greater than or equal to 4 hours: 100%  PAP range : 5-20 cm H2O  Average 90% pressure: 6.8 cmH2O  Leak: <1 minutes  Average AHI 3.3 events/hr

## 2020-10-14 ENCOUNTER — TELEPHONE (OUTPATIENT)
Dept: NEUROSURGERY | Facility: CLINIC | Age: 35
End: 2020-10-14

## 2020-10-14 NOTE — TELEPHONE ENCOUNTER
Called to confirm patient's appt with Dr Hardin on 10.15.2020  - no answer so left a message to call me back      an zhong CMA

## 2020-10-15 ENCOUNTER — OFFICE VISIT (OUTPATIENT)
Dept: NEUROSURGERY | Facility: CLINIC | Age: 35
End: 2020-10-15

## 2020-10-15 VITALS
DIASTOLIC BLOOD PRESSURE: 70 MMHG | WEIGHT: 223.2 LBS | BODY MASS INDEX: 31.25 KG/M2 | HEIGHT: 71 IN | SYSTOLIC BLOOD PRESSURE: 122 MMHG

## 2020-10-15 DIAGNOSIS — G56.21 CUBITAL TUNNEL SYNDROME ON RIGHT: Primary | ICD-10-CM

## 2020-10-15 DIAGNOSIS — Z78.9 NON-SMOKER: ICD-10-CM

## 2020-10-15 PROCEDURE — 99213 OFFICE O/P EST LOW 20 MIN: CPT | Performed by: NEUROLOGICAL SURGERY

## 2020-10-15 NOTE — PROGRESS NOTES
SUBJECTIVE:  Patient ID: Jayce Gonzalez III is a 35 y.o. male is here today for follow-up.    Chief Complaint: Right cubital tunnel syndrome  Chief Complaint   Patient presents with   • RIGHT CUBITAL TUNNEL SYNDROME     patient is here for follow up; patient underwent RT cubital tunnel syndrome on 7/15/2020.       HPI  35-year-old gentleman who underwent a right cubital tunnel release in July 15, 2020.  He says the discomfort and numbness and tingling in his right hand from the elbow to the ulnar aspect of his hand is markedly improved.  He still gets occasional discomfort but not the way it was before surgery.  He still complains of some myofascial neck pain and some right trapezius pain.  Got an extensive course of physical therapy along with oral anti-inflammatory medicines and pain management injections.    The following portions of the patient's history were reviewed and updated as appropriate: allergies, current medications, past family history, past medical history, past social history, past surgical history and problem list.    OBJECTIVE:    Review of Systems   Musculoskeletal: Positive for myalgias and neck pain.   All other systems reviewed and are negative.         Physical Exam  Eyes:      Extraocular Movements: EOM normal.      Pupils: Pupils are equal, round, and reactive to light.   Neurological:      Mental Status: He is oriented to person, place, and time.      Coordination: Finger-Nose-Finger Test normal.      Gait: Gait is intact.      Deep Tendon Reflexes: Strength normal.   Psychiatric:         Speech: Speech normal.         Neurologic Exam     Mental Status   Oriented to person, place, and time.   Attention: normal.   Speech: speech is normal   Level of consciousness: alert  Knowledge: good.     Cranial Nerves     CN II   Visual fields full to confrontation.     CN III, IV, VI   Pupils are equal, round, and reactive to light.  Extraocular motions are normal.     CN V   Facial sensation intact.      CN VII   Facial expression full, symmetric.     CN VIII   CN VIII normal.     CN IX, X   CN IX normal.   CN X normal.     CN XI   CN XI normal.     CN XII   CN XII normal.     Motor Exam   Muscle bulk: normal  Overall muscle tone: normal  Right arm pronator drift: absent  Left arm pronator drift: absent    Strength   Strength 5/5 throughout. Ulnar  strength in the fourth and fifth fingers 4-5 on the right     Sensory Exam   Light touch normal.   Pinprick normal.     Gait, Coordination, and Reflexes     Gait  Gait: normal    Coordination   Finger to nose coordination: normal    Tremor   Resting tremor: absent  Intention tremor: absent  Action tremor: absent    Reflexes   Reflexes 2+ except as noted.       Independent Review of Radiographic Studies:       ASSESSMENT/PLAN:  The patient has a seen an improvement in his ulnar neuropathy after cubital tunnel release.  He still complains of some myofascial right paraspinal neck pain.  He does not require any surgical intervention.  At this point we will need see him on an as-needed basis.      1. Cubital tunnel syndrome on right    2. Non-smoker            No follow-ups on file.      Jean-Paul Hardin MD

## 2020-10-15 NOTE — PATIENT INSTRUCTIONS
"PATIENT TO CONTINUE TO FOLLOW UP WITH HIS PRIMARY CARE PROVIDER FOR YEARLY PHYSICAL EXAMS TO ENSURE COMPLETE HEALTH MAINTENANCE        BMI for Adults  What is BMI?  Body mass index (BMI) is a number that is calculated from a person's weight and height. BMI can help estimate how much of a person's weight is composed of fat. BMI does not measure body fat directly. Rather, it is an alternative to procedures that directly measure body fat, which can be difficult and expensive.  BMI can help identify people who may be at higher risk for certain medical problems.  What are BMI measurements used for?  BMI is used as a screening tool to identify possible weight problems. It helps determine whether a person is obese, overweight, a healthy weight, or underweight.  BMI is useful for:  · Identifying a weight problem that may be related to a medical condition or may increase the risk for medical problems.  · Promoting changes, such as changes in diet and exercise, to help reach a healthy weight. BMI screening can be repeated to see if these changes are working.  How is BMI calculated?  BMI involves measuring your weight in relation to your height. Both height and weight are measured, and the BMI is calculated from those numbers. This can be done either in English (U.S[place] or metric measurements. Note that charts and online BMI calculators are available to help you find your BMI quickly and easily without having to do these calculations yourself.  To calculate your BMI in English (U.S.) measurements:    1. Measure your weight in pounds (lb).  2. Multiply the number of pounds by 703.  ? For example, for a person who weighs 180 lb, multiply that number by 703, which equals 126,540.  3. Measure your height in inches. Then multiply that number by itself to get a measurement called \"inches squared.\"  ? For example, for a person who is 70 inches tall, the \"inches squared\" measurement is 70 inches x 70 inches, which equals 4,900 inches " "squared.  4. Divide the total from step 2 (number of lb x 703) by the total from step 3 (inches squared): 126,540 ÷ 4,900 = 25.8. This is your BMI.  To calculate your BMI in metric measurements:  1. Measure your weight in kilograms (kg).  2. Measure your height in meters (m). Then multiply that number by itself to get a measurement called \"meters squared.\"  ? For example, for a person who is 1.75 m tall, the \"meters squared\" measurement is 1.75 m x 1.75 m, which is equal to 3.1 meters squared.  3. Divide the number of kilograms (your weight) by the meters squared number. In this example: 70 ÷ 3.1 = 22.6. This is your BMI.  What do the results mean?  BMI charts are used to identify whether you are underweight, normal weight, overweight, or obese. The following guidelines will be used:  · Underweight: BMI less than 18.5.  · Normal weight: BMI between 18.5 and 24.9.  · Overweight: BMI between 25 and 29.9.  · Obese: BMI of 30 or above.  Keep these notes in mind:  · Weight includes both fat and muscle, so someone with a muscular build, such as an athlete, may have a BMI that is higher than 24.9. In cases like these, BMI is not an accurate measure of body fat.  · To determine if excess body fat is the cause of a BMI of 25 or higher, further assessments may need to be done by a health care provider.  · BMI is usually interpreted in the same way for men and women.  Where to find more information  For more information about BMI, including tools to quickly calculate your BMI, go to these websites:  · Centers for Disease Control and Prevention: www.cdc.gov  · American Heart Association: www.heart.org  · National Heart, Lung, and Blood [place]: www.nhlbi.nih.gov  Summary  · Body mass index (BMI) is a number that is calculated from a person's weight and height.  · BMI may help estimate how much of a person's weight is composed of fat. BMI can help identify those who may be at higher risk for certain medical problems.  · BMI " can be measured using English measurements or metric measurements.  · BMI charts are used to identify whether you are underweight, normal weight, overweight, or obese.  This information is not intended to replace advice given to you by your health care provider. Make sure you discuss any questions you have with your health care provider.  Document Released: 08/29/2005 Document Revised: 09/09/2020 Document Reviewed: 07/17/2020  Elsevier Patient Education © 2020 Elsevier Inc.

## 2021-03-02 ENCOUNTER — TELEPHONE (OUTPATIENT)
Dept: NEUROSURGERY | Facility: CLINIC | Age: 36
End: 2021-03-02

## 2021-03-02 NOTE — TELEPHONE ENCOUNTER
I contacted the patient to let him know the CD is no longer available in our office as it was sent to [facility] on 1/27/21 after it was loaded onto the PACS.    an zhong CMA

## 2021-03-02 NOTE — TELEPHONE ENCOUNTER
Of note: the imaging CD was put in the [email or web address] file on 1/27/2021 and is no longer available in the office.    an zhong CMA

## 2021-03-02 NOTE — TELEPHONE ENCOUNTER
PATIENT WAS LAST SEEN ON 10/15/20 & PATIENT/CAREGIVER CALLED REGARDING IMAGING DISC THAT WAS LEFT WITH PROVIDER SINCE OFFICE VISIT IN MAY. IF STAFF CAN LOCATE DISC IN OFFICE, PATIENT PREFERS TO RECEIVE DISC VIA  ON THUR MORNING    PATIENT/CAREGIVER CAN BE CONTACTED [phone] WITH AN UPDATE.

## 2021-04-14 ENCOUNTER — DOCUMENTATION (OUTPATIENT)
Dept: PHYSICAL THERAPY | Facility: CLINIC | Age: 36
End: 2021-04-14

## 2021-04-14 DIAGNOSIS — M54.12 CERVICAL RADICULOPATHY: ICD-10-CM

## 2021-04-14 DIAGNOSIS — M54.2 CERVICALGIA: Primary | ICD-10-CM

## 2021-04-14 DIAGNOSIS — G56.21 CUBITAL TUNNEL SYNDROME ON RIGHT: ICD-10-CM

## 2021-04-14 NOTE — PROGRESS NOTES
Discharge Summary  Discharge Summary from Physical Therapy Report      Dates  PT visit: Initial eval on 2/13/2020  Number of Visits: 9     Discharge Status of Patient: Pt was placed on hold on 3/13/2020 due to lack of progress until his MD appt. Pt was scheduled for surgery at that time.    Goals: Partially Met; see last treatment noted dated 3/13/2021    Discharge Plan: Pt will be having surgery    Comments N/A    Date of Discharge 5/19/2020        Nupur Orr, PT  Physical Therapist

## 2021-08-31 ENCOUNTER — OFFICE VISIT (OUTPATIENT)
Dept: SLEEP MEDICINE | Facility: HOSPITAL | Age: 36
End: 2021-08-31

## 2021-08-31 VITALS
OXYGEN SATURATION: 99 % | SYSTOLIC BLOOD PRESSURE: 131 MMHG | WEIGHT: 219 LBS | HEART RATE: 49 BPM | DIASTOLIC BLOOD PRESSURE: 83 MMHG | HEIGHT: 71 IN | BODY MASS INDEX: 30.66 KG/M2

## 2021-08-31 DIAGNOSIS — R00.1 BRADYCARDIA: ICD-10-CM

## 2021-08-31 DIAGNOSIS — G47.33 OBSTRUCTIVE SLEEP APNEA, ADULT: Primary | ICD-10-CM

## 2021-08-31 PROCEDURE — 99214 OFFICE O/P EST MOD 30 MIN: CPT | Performed by: NURSE PRACTITIONER

## 2021-08-31 NOTE — PATIENT INSTRUCTIONS
*Call [employer] to register machine's serial number: [phone]  *[employer]' website: [email or web address]

## 2021-08-31 NOTE — PROGRESS NOTES
Sleep Clinic Follow Up    Date: 2021  Primary Care Provider: Nasra Eduardo MD    Last office visit: 2020 (telephone visit) (I reviewed this note)    CC: Follow up: PRASANTH on CPAP      Interim History:  Since the last visit:    1) mild PRASANTH -  Jayce Gonzalez III has remained compliant with CPAP. He denies mask and machine issues, dry mouth, headaches, or pressures intolerance. He denies abnormal dreams, sleep paralysis, nasal congestion, URI sx.    2) Patient denies RLS symptoms.     3) Bradycardia - Original resting pulse rate today with vital sign check was 49 bpm. Recheck was 59 bpm. Patient denies any chest pain, palpitations, blurry vision, shortness of breath, or headaches currently. He does state that this is a reoccurring problem - he was supposed to have seen a Cardiologist before but never made an appointment. Patient does appear tired but states that he has been working 15-hour shifts lately and has been tired from working so much.    Sleep Testin. HST on 2019, AHI of 9.9   2. Currently on 5-20 cm H2O    PAP Data:    Time frame: 2020-2021   Compliance: 100%  Average use on days used: 8 hrs 28 min  Percent of days with usage greater than or equal to 4 hours: 99.7%  PAP range: 5-20 cm H2O  Average 90% pressure: 7.0 cmH2O  Leak: 0 minutes  Average AHI: 2.7 events/hr  Mask type: Nasal cushion  DME: [facility]    Bed time: 9846-0932  Sleep latency: 10-20 minutes  Number of times awakens during the night: 3-4  Wake time: [id number]  Estimated total sleep time at night: 6-8 hours  Caffeine intake: 0 cups of coffee, 0 cups of tea, and 0-1 sodas per day  Alcohol intake: 0 drinks per week  Nap time: none   Sleepiness with Driving: none     Ruso - 9    PMHx, FH, SH reviewed and pertinent changes are: Neck fusion surgery ~3 months ago. Right elbow surgery.      REVIEW OF SYSTEMS:   Negative for chest pain, SOA, fever, chills, cough, N/V/D, abdominal pain.    Smoking:none    Jayce BARNHART  [place] III  reports that he has quit smoking. He has never used smokeless tobacco.      Exam:  Vitals:    08/31/21 1419   BP: 131/83   Pulse: (!) 49   SpO2: 99%           08/31/21  1419   Weight: 99.3 kg (219 lb)     Body mass index is 30.56 kg/m². Patient's Body mass index is 30.56 kg/m². indicating that he is obese (BMI >30). Obesity-related health conditions include the following: obstructive sleep apnea and GERD. Obesity is unchanged. BMI is is above average; BMI management plan is completed. I recommend portion control and increasing exercise.      Gen:                No distress, conversant, pleasant, appears stated age, alert, oriented  Eyes:               Anicteric sclera, moist conjunctiva, no lid lag                           PERRL, EOMI   Heent:             NC/AT                          Oropharynx clear                          Normal hearing  Lungs:             Normal effort, non-labored breathing                          Clear to auscultation bilaterally          CV:                  Normal S1/S2, no murmur                          No lower extremity edema  ABD:               Soft, rounded, non-distended                          Normal bowel sounds                    Psych:             Appropriate affect  Neuro:             CN 2-12 appear intact      Past Medical History:   Diagnosis Date   • Arthritis    • GERD (gastroesophageal reflux disease)    • Hypertension        Current Outpatient Medications:   •  acetaminophen (TYLENOL) 500 MG tablet, Take 500 mg by mouth Every 6 (Six) Hours As Needed for Mild Pain ., Disp: , Rfl:   •  albuterol sulfate  (90 Base) MCG/ACT inhaler, Inhale., Disp: , Rfl:   •  cyclobenzaprine (FLEXERIL) 10 MG tablet, cyclobenzaprine 10 mg tablet, Disp: , Rfl:   •  HYDROcodone-acetaminophen (NORCO) 5-325 MG per tablet, Take 1 tablet by mouth Every 8 (Eight) Hours As Needed for Moderate Pain ., Disp: 15 tablet, Rfl: 0  •  loratadine (Claritin) 10 MG tablet, Take 10 mg by  mouth Daily., Disp: , Rfl:   •  omeprazole (priLOSEC) 20 MG capsule, TAKE 1 CAPSULE BY MOUTH ONCE DAILY FOR 90 DAYS, Disp: , Rfl:     WBC   Date Value Ref Range Status   07/02/2020 7.85 3.40 - 10.80 10*3/mm3 Final     RBC   Date Value Ref Range Status   07/02/2020 4.83 4.14 - 5.80 10*6/mm3 Final     Hemoglobin   Date Value Ref Range Status   07/02/2020 14.1 13.0 - 17.7 g/dL Final     Hematocrit   Date Value Ref Range Status   07/02/2020 41.9 37.5 - 51.0 % Final     MCV   Date Value Ref Range Status   07/02/2020 86.7 79.0 - 97.0 fL Final     MCH   Date Value Ref Range Status   07/02/2020 29.2 26.6 - 33.0 pg Final     MCHC   Date Value Ref Range Status   07/02/2020 33.7 31.5 - 35.7 g/dL Final     RDW   Date Value Ref Range Status   07/02/2020 13.2 12.3 - 15.4 % Final     RDW-SD   Date Value Ref Range Status   07/02/2020 41.5 37.0 - 54.0 fl Final     MPV   Date Value Ref Range Status   07/02/2020 9.2 6.0 - 12.0 fL Final     Platelets   Date Value Ref Range Status   07/02/2020 224 140 - 450 10*3/mm3 Final     Neutrophil %   Date Value Ref Range Status   07/02/2020 56.7 42.7 - 76.0 % Final     Lymphocyte %   Date Value Ref Range Status   07/02/2020 32.0 19.6 - 45.3 % Final     Monocyte %   Date Value Ref Range Status   07/02/2020 6.0 5.0 - 12.0 % Final     Eosinophil %   Date Value Ref Range Status   07/02/2020 4.3 0.3 - 6.2 % Final     Basophil %   Date Value Ref Range Status   07/02/2020 0.5 0.0 - 1.5 % Final     Immature Grans %   Date Value Ref Range Status   07/02/2020 0.5 0.0 - 0.5 % Final     Neutrophils, Absolute   Date Value Ref Range Status   07/02/2020 4.45 1.70 - 7.00 10*3/mm3 Final     Lymphocytes, Absolute   Date Value Ref Range Status   07/02/2020 2.51 0.70 - 3.10 10*3/mm3 Final     Monocytes, Absolute   Date Value Ref Range Status   07/02/2020 0.47 0.10 - 0.90 10*3/mm3 Final     Eosinophils, Absolute   Date Value Ref Range Status   07/02/2020 0.34 0.00 - 0.40 10*3/mm3 Final     Basophils, Absolute   Date  Value Ref Range Status   07/02/2020 0.04 0.00 - 0.20 10*3/mm3 Final     Immature Grans, Absolute   Date Value Ref Range Status   07/02/2020 0.04 0.00 - 0.05 10*3/mm3 Final     nRBC   Date Value Ref Range Status   07/02/2020 0.0 0.0 - 0.2 /100 WBC Final       Lab Results   Component Value Date    GLUCOSE 97 07/02/2020    BUN 15 07/02/2020    CREATININE 0.95 07/02/2020    EGFRIFNONA 91 07/02/2020    BCR 15.8 07/02/2020    K 3.8 07/02/2020    CO2 26.0 07/02/2020    CALCIUM 9.4 07/02/2020    ALBUMIN 4.40 07/02/2020    AST 24 07/02/2020    ALT 31 07/02/2020       Assessment and Plan:    1. Obstructive sleep apnea - Established, stable (1)  1. Compliant with PAP therapy  2. Advised patient of new safety recall of [employer] CPAP/BiPAP/AVAPS machines. We discussed the risk versus benefit of continuing usage of PAP therapy. The company has recommended that patients stop usage of their current machines. Instructed patient to call [employer] ([phone]) to check if the machine is under warranty for repair or replacement. [place] machine with serial number on website: [email or web address]/[email or web address]. Follow up with DME company regarding any further questions, or for consideration for new machine once eligible. Advised patient to avoid unapproved ozone-type CPAP . Advised to call us if any further questions or problems.  3. Script for PAP supplies  4. Return to clinic in 1 year with compliance report unless change in symptoms in interim period  2. Bradycardia - New (to me), no additional work-up planned (3)   1. This is a reoccurring problem according to patient  2. Referral to Cardiology for evaluation ([place])      I spent 20 minutes caring for Jayce on this date of service. This time includes time spent by me in the following activities: preparing for the visit, reviewing tests, obtaining and/or reviewing a separately obtained history, performing a medically appropriate examination and/or  evaluation , counseling and educating the patient/family/caregiver, referring and communicating with other health care professionals  and documenting information in the medical record; discussing PAP therapy, PAP compliance and PAP maintenance    RTC in 12 months. Patient agrees to return sooner if changes in symptoms.        This document has been electronically signed by WHITNEY Mares on August 31, 2021 14:23 CDT          CC: Nasra Eduardo MD          No ref. provider found

## 2021-09-01 ENCOUNTER — OFFICE VISIT (OUTPATIENT)
Dept: CARDIOLOGY | Facility: CLINIC | Age: 36
End: 2021-09-01

## 2021-09-01 VITALS
HEART RATE: 43 BPM | OXYGEN SATURATION: 98 % | DIASTOLIC BLOOD PRESSURE: 80 MMHG | HEIGHT: 71 IN | BODY MASS INDEX: 30.8 KG/M2 | SYSTOLIC BLOOD PRESSURE: 120 MMHG | WEIGHT: 220 LBS

## 2021-09-01 DIAGNOSIS — G47.33 OBSTRUCTIVE SLEEP APNEA, ADULT: ICD-10-CM

## 2021-09-01 DIAGNOSIS — R00.1 BRADYCARDIA, SINUS: Primary | ICD-10-CM

## 2021-09-01 DIAGNOSIS — R42 DIZZINESS: ICD-10-CM

## 2021-09-01 DIAGNOSIS — E66.09 CLASS 1 OBESITY DUE TO EXCESS CALORIES WITHOUT SERIOUS COMORBIDITY WITH BODY MASS INDEX (BMI) OF 30.0 TO 30.9 IN ADULT: ICD-10-CM

## 2021-09-01 DIAGNOSIS — R07.9 CHEST PAIN, UNSPECIFIED TYPE: ICD-10-CM

## 2021-09-01 PROCEDURE — 93000 ELECTROCARDIOGRAM COMPLETE: CPT | Performed by: INTERNAL MEDICINE

## 2021-09-01 PROCEDURE — 99204 OFFICE O/P NEW MOD 45 MIN: CPT | Performed by: INTERNAL MEDICINE

## 2021-09-01 RX ORDER — TIZANIDINE 4 MG/1
4 TABLET ORAL
COMMUNITY
Start: 2021-03-26 | End: 2022-04-11

## 2021-09-01 NOTE — PROGRESS NOTES
Reason for Visit: Bradycardia.    HPI:  Jayce Gonzalez III is a 35 y.o. male is being seen for consultation today at the request of WHITNEY Mares for evaluation of bradycardia.  He reports having a slow heart rate for years.  He was seen at [facility] several years ago.  He was told to follow up with a cardiologist but never did.  He ended up getting diagnosed with dehydration.  He is active at work but does not get much formal exercise.  He has been having symptoms of dizziness, lightheadedness, low blood pressure, and intermittent chest pain.  These symptoms started about a month ago.  It happens about 2 to 3 times per week and will last anywhere from a couple minutes to a few hours.  The chest pain is random and not associated with exertion.  He describes it as a dull ache.  He follows in the sleep clinic for obstructive sleep apnea.  He notes he is compliant with the CPAP.    Previous Cardiac Testing and Procedures:  -BMP (5/6/2021) creatinine 1.1, BUN 20, GFR 76, sodium 140, potassium 4  -EKG (7/2/2020) normal sinus rhythm at 69 bpm    Patient Active Problem List   Diagnosis   • Neck pain   • Obstructive sleep apnea, adult   • Asthma   • Cervicalgia   • Cervical radiculopathy   • Cubital tunnel syndrome on right   • Non-smoker   • Allergic rhinitis   • Gastroesophageal reflux disease   • Muscle weakness   • Numbness and tingling in right hand   • Skin sensation disturbance   • Vertigo   • Class 1 obesity due to excess calories without serious comorbidity with body mass index (BMI) of 30.0 to 30.9 in adult       Social History     Tobacco Use   • Smoking status: Former Smoker   • Smokeless tobacco: Never Used   Substance Use Topics   • Alcohol use: No   • Drug use: Never       Family History   Problem Relation Age of Onset   • Arthritis Mother        The following portions of the patient's history were reviewed and updated as appropriate: allergies, current medications, past family history, past  "medical history, past social history, past surgical history and problem list.      Current Outpatient Medications:   •  loratadine (Claritin) 10 MG tablet, Take 10 mg by mouth Daily., Disp: , Rfl:   •  omeprazole (priLOSEC) 20 MG capsule, TAKE 1 CAPSULE BY MOUTH ONCE DAILY FOR 90 DAYS, Disp: , Rfl:   •  tiZANidine (ZANAFLEX) 4 MG tablet, Take 4 mg by mouth., Disp: , Rfl:   •  acetaminophen (TYLENOL) 500 MG tablet, Take 500 mg by mouth Every 6 (Six) Hours As Needed for Mild Pain ., Disp: , Rfl:   •  albuterol sulfate  (90 Base) MCG/ACT inhaler, Inhale., Disp: , Rfl:     Review of Systems   Constitutional: Positive for malaise/fatigue. Negative for chills and fever.   Cardiovascular: Positive for chest pain. Negative for paroxysmal nocturnal dyspnea.   Respiratory: Negative for cough and shortness of breath.    Skin: Negative for rash.   Gastrointestinal: Negative for abdominal pain and heartburn.   Neurological: Positive for dizziness and vertigo. Negative for numbness.       Objective   /80 (BP Location: Left arm, Patient Position: Sitting, Cuff Size: Adult)   Pulse (!) 43   Ht 180.3 cm (71\")   Wt 99.8 kg (220 lb)   SpO2 98%   BMI 30.68 kg/m²   Constitutional:       Appearance: Well-developed. Obese.   HENT:      Head: Normocephalic and atraumatic.   Pulmonary:      Effort: Pulmonary effort is normal.      Breath sounds: Normal breath sounds.   Cardiovascular:      Bradycardia present. Regular rhythm.      Murmurs: There is no murmur.      No gallop. No click.   Edema:     Peripheral edema absent.   Skin:     General: Skin is warm and dry.   Neurological:      Mental Status: Alert and oriented to person, place, and time.         ECG 12 Lead    Date/Time: 9/1/2021 10:32 AM  Performed by: Romero Huber MD  Authorized by: Romero Huber MD   Comparison: compared with previous ECG from 7/2/2020  Comparison to previous ECG: Heart rate has decreased  Rhythm: sinus bradycardia              ICD-10-CM " ICD-9-CM   1. Bradycardia, sinus  R00.1 427.89   2. Chest pain, unspecified type  R07.9 786.50   3. Dizziness  R42 780.4   4. Obstructive sleep apnea, adult  G47.33 327.23   5. Class 1 obesity due to excess calories without serious comorbidity with body mass index (BMI) of 30.0 to 30.9 in adult  E66.09 278.00    Z68.30 V85.30         Assessment/Plan:  1.  Sinus bradycardia: Heart rate of 43 bpm today.  Unclear if this is contributing to any of his symptoms.  Check a TSH.  Check a treadmill stress test to evaluate chronotropic response.  Check a Holter monitor.    2.  Chest pain: Mostly atypical characteristics.  No exertional chest pain.  Will evaluate further with a EKG treadmill stress.    3.  Dizziness: Unclear etiology.  Evaluate further with a Holter monitor.    4.  Obstructive sleep apnea: Patient reports being compliant with CPAP.    5.  Obesity: Patient's Body mass index is 30.68 kg/m². indicating that he is obese (BMI >30). Obesity-related health conditions include the following: obstructive sleep apnea. Obesity is newly identified. BMI is is above average; BMI management plan is completed. We discussed portion control and increasing exercise.

## 2021-09-13 ENCOUNTER — HOSPITAL ENCOUNTER (OUTPATIENT)
Dept: CARDIOLOGY | Facility: HOSPITAL | Age: 36
Discharge: HOME | End: 2021-09-13

## 2021-09-13 VITALS
SYSTOLIC BLOOD PRESSURE: 119 MMHG | BODY MASS INDEX: 30.8 KG/M2 | WEIGHT: 220.02 LBS | DIASTOLIC BLOOD PRESSURE: 79 MMHG | HEIGHT: 71 IN | HEART RATE: 64 BPM

## 2021-09-13 LAB
BH CV STRESS BP STAGE 1: NORMAL
BH CV STRESS BP STAGE 2: NORMAL
BH CV STRESS BP STAGE 3: NORMAL
BH CV STRESS DURATION MIN STAGE 1: 3
BH CV STRESS DURATION MIN STAGE 2: 3
BH CV STRESS DURATION MIN STAGE 3: 1
BH CV STRESS DURATION SEC STAGE 1: 0
BH CV STRESS DURATION SEC STAGE 2: 0
BH CV STRESS DURATION SEC STAGE 3: 12
BH CV STRESS GRADE STAGE 1: 10
BH CV STRESS GRADE STAGE 2: 12
BH CV STRESS GRADE STAGE 3: 14
BH CV STRESS HR STAGE 1: 109
BH CV STRESS HR STAGE 2: 146
BH CV STRESS HR STAGE 3: 173
BH CV STRESS METS STAGE 1: 5
BH CV STRESS METS STAGE 2: 7.5
BH CV STRESS METS STAGE 3: 10
BH CV STRESS PROTOCOL 1: NORMAL
BH CV STRESS RECOVERY BP: NORMAL MMHG
BH CV STRESS RECOVERY HR: 81 BPM
BH CV STRESS SPEED STAGE 1: 1.7
BH CV STRESS SPEED STAGE 2: 2.5
BH CV STRESS SPEED STAGE 3: 3.4
BH CV STRESS STAGE 1: 1
BH CV STRESS STAGE 2: 2
BH CV STRESS STAGE 3: 3
MAXIMAL PREDICTED HEART RATE: 185 BPM
PERCENT MAX PREDICTED HR: 93.51 %
STRESS BASELINE BP: NORMAL MMHG
STRESS BASELINE HR: 60 BPM
STRESS PERCENT HR: 110 %
STRESS POST ESTIMATED WORKLOAD: 10 METS
STRESS POST EXERCISE DUR MIN: 7 MIN
STRESS POST EXERCISE DUR SEC: 12 SEC
STRESS POST PEAK BP: NORMAL MMHG
STRESS POST PEAK HR: 173 BPM
STRESS TARGET HR: 157 BPM

## 2021-09-13 PROCEDURE — 93246 EXT ECG>7D<15D RECORDING: CPT

## 2021-09-13 PROCEDURE — 93017 CV STRESS TEST TRACING ONLY: CPT

## 2021-09-13 PROCEDURE — 93018 CV STRESS TEST I&R ONLY: CPT | Performed by: INTERNAL MEDICINE

## 2021-09-15 ENCOUNTER — TELEPHONE (OUTPATIENT)
Dept: CARDIOLOGY | Facility: CLINIC | Age: 36
End: 2021-09-15

## 2021-09-15 DIAGNOSIS — R94.39 ABNORMAL STRESS ECG: ICD-10-CM

## 2021-09-15 DIAGNOSIS — R07.9 CHEST PAIN, UNSPECIFIED TYPE: Primary | ICD-10-CM

## 2021-09-15 NOTE — TELEPHONE ENCOUNTER
----- Message from Romero Huber MD sent at 9/15/2021 12:07 PM CDT -----  I tried to call him but was unable to reach him. Please let him know that his stress test was intermediate risk. I recommend further evaluation with a stress echo and have placed an order for this.

## 2021-09-15 NOTE — TELEPHONE ENCOUNTER
I called pt home number. I spoke with pt wife Kimber. I told her to have her  call me back in [place] office.

## 2021-09-21 ENCOUNTER — HOSPITAL ENCOUNTER (OUTPATIENT)
Dept: CARDIOLOGY | Facility: HOSPITAL | Age: 36
Discharge: HOME | End: 2021-09-21
Admitting: INTERNAL MEDICINE

## 2021-09-21 VITALS
DIASTOLIC BLOOD PRESSURE: 76 MMHG | HEIGHT: 71 IN | WEIGHT: 220.02 LBS | BODY MASS INDEX: 30.8 KG/M2 | SYSTOLIC BLOOD PRESSURE: 108 MMHG | HEART RATE: 46 BPM

## 2021-09-21 PROCEDURE — 93352 ADMIN ECG CONTRAST AGENT: CPT | Performed by: INTERNAL MEDICINE

## 2021-09-21 PROCEDURE — 93350 STRESS TTE ONLY: CPT

## 2021-09-21 PROCEDURE — 93017 CV STRESS TEST TRACING ONLY: CPT

## 2021-09-21 PROCEDURE — 93018 CV STRESS TEST I&R ONLY: CPT | Performed by: INTERNAL MEDICINE

## 2021-09-21 PROCEDURE — 93350 STRESS TTE ONLY: CPT | Performed by: INTERNAL MEDICINE

## 2021-09-21 PROCEDURE — 25010000002 PERFLUTREN 6.52 MG/ML SUSPENSION: Performed by: INTERNAL MEDICINE

## 2021-09-21 RX ADMIN — PERFLUTREN 8.48 MG: 6.52 INJECTION, SUSPENSION INTRAVENOUS at 13:49

## 2021-09-22 LAB
BH CV STRESS BP STAGE 1: NORMAL
BH CV STRESS BP STAGE 2: NORMAL
BH CV STRESS BP STAGE 3: NORMAL
BH CV STRESS DURATION MIN STAGE 1: 3
BH CV STRESS DURATION MIN STAGE 2: 3
BH CV STRESS DURATION MIN STAGE 3: 3
BH CV STRESS DURATION SEC STAGE 1: 0
BH CV STRESS DURATION SEC STAGE 2: 0
BH CV STRESS DURATION SEC STAGE 3: 0
BH CV STRESS GRADE STAGE 1: 10
BH CV STRESS GRADE STAGE 2: 12
BH CV STRESS GRADE STAGE 3: 14
BH CV STRESS HR STAGE 1: 104
BH CV STRESS HR STAGE 2: 137
BH CV STRESS HR STAGE 3: 178
BH CV STRESS METS STAGE 1: 5
BH CV STRESS METS STAGE 2: 7.5
BH CV STRESS METS STAGE 3: 10
BH CV STRESS PROTOCOL 1: NORMAL
BH CV STRESS RECOVERY BP: NORMAL MMHG
BH CV STRESS RECOVERY HR: 82 BPM
BH CV STRESS SPEED STAGE 1: 1.7
BH CV STRESS SPEED STAGE 2: 2.5
BH CV STRESS SPEED STAGE 3: 3.4
BH CV STRESS STAGE 1: 1
BH CV STRESS STAGE 2: 2
BH CV STRESS STAGE 3: 3
MAXIMAL PREDICTED HEART RATE: 185 BPM
PERCENT MAX PREDICTED HR: 96.22 %
STRESS BASELINE BP: NORMAL MMHG
STRESS BASELINE HR: 46 BPM
STRESS PERCENT HR: 113 %
STRESS POST ESTIMATED WORKLOAD: 10 METS
STRESS POST EXERCISE DUR MIN: 9 MIN
STRESS POST EXERCISE DUR SEC: 0 SEC
STRESS POST PEAK BP: NORMAL MMHG
STRESS POST PEAK HR: 178 BPM
STRESS TARGET HR: 157 BPM

## 2021-09-24 ENCOUNTER — TELEPHONE (OUTPATIENT)
Dept: CARDIOLOGY | Facility: CLINIC | Age: 36
End: 2021-09-24

## 2021-09-24 NOTE — TELEPHONE ENCOUNTER
----- Message from Romero Huber MD sent at 9/24/2021  1:00 PM CDT -----  Please let him know that the stress test was low risk and showed no evidence of blockages.

## 2021-10-12 LAB
MAXIMAL PREDICTED HEART RATE: 185 BPM
STRESS TARGET HR: 157 BPM

## 2021-10-12 PROCEDURE — 93248 EXT ECG>7D<15D REV&INTERPJ: CPT | Performed by: INTERNAL MEDICINE

## 2021-10-20 ENCOUNTER — OFFICE VISIT (OUTPATIENT)
Dept: CARDIOLOGY | Facility: CLINIC | Age: 36
End: 2021-10-20

## 2021-10-20 VITALS
HEIGHT: 71 IN | BODY MASS INDEX: 31.22 KG/M2 | SYSTOLIC BLOOD PRESSURE: 120 MMHG | WEIGHT: 223 LBS | DIASTOLIC BLOOD PRESSURE: 80 MMHG | OXYGEN SATURATION: 98 % | HEART RATE: 69 BPM

## 2021-10-20 DIAGNOSIS — E66.09 CLASS 1 OBESITY DUE TO EXCESS CALORIES WITHOUT SERIOUS COMORBIDITY WITH BODY MASS INDEX (BMI) OF 31.0 TO 31.9 IN ADULT: ICD-10-CM

## 2021-10-20 DIAGNOSIS — R07.9 CHEST PAIN, UNSPECIFIED TYPE: ICD-10-CM

## 2021-10-20 DIAGNOSIS — R42 DIZZINESS: ICD-10-CM

## 2021-10-20 DIAGNOSIS — G47.33 OSA (OBSTRUCTIVE SLEEP APNEA): ICD-10-CM

## 2021-10-20 DIAGNOSIS — R00.1 BRADYCARDIA, SINUS: Primary | ICD-10-CM

## 2021-10-20 DIAGNOSIS — E78.5 DYSLIPIDEMIA: ICD-10-CM

## 2021-10-20 PROCEDURE — 99213 OFFICE O/P EST LOW 20 MIN: CPT | Performed by: INTERNAL MEDICINE

## 2021-10-20 NOTE — PROGRESS NOTES
Reason for Visit: cardiovascular follow up.    HPI:  Jyace Gonzalez III is a 36 y.o. male is here today for follow-up.  He was last seen in consultation for evaluation of bradycardia in September.  He noted this was a chronic problem and was previously evaluated at [place].  Last visit he complained of symptoms of dizziness, lightheadedness, low blood pressure, and chest pain that were intermittent and occurring several times a week.  He had a Holter monitor that showed an average heart rate of 72 bpm with rare ectopy and the symptoms were all associated with sinus rhythm.  His EKG treadmill stress test with intermediate risk due to complaints of chest pain but showed a normal chronotropic response.  He then had an exercise stress echo that was low risk for ischemia.      Previous Cardiac Testing and Procedures:  -BMP (5/6/2021) creatinine 1.1, BUN 20, GFR 76, sodium 140, potassium 4  -EKG (7/2/2020) normal sinus rhythm at 69 bpm  -Lipid panel (9/7/2021) total cholesterol 197, HDL 40, , triglycerides 115  -Holter monitor (9/13/2021) average heart rate 72 bpm, rare atrial and ventricular ectopic beat, patient symptoms were associated with sinus rhythm, benign monitor study  -EKG treadmill stress (9/13/2021) average functional capacity, normal chronotropic response to exercise, electrically negative, chest pain reported with stress, [facility] treadmill score 3.2, intermediate risk  -Exercise stress echo (9/22/2021) normal functional capacity, electrically negative, chest pain at both rest and stress, normal wall motion at rest and stress, low risk for ischemia    Patient Active Problem List   Diagnosis   • Neck pain   • Obstructive sleep apnea, adult   • Asthma   • Cervicalgia   • Cervical radiculopathy   • Cubital tunnel syndrome on right   • Non-smoker   • Allergic rhinitis   • Gastroesophageal reflux disease   • Muscle weakness   • Numbness and tingling in right hand   • Skin sensation disturbance   • Vertigo  "  • Class 1 obesity due to excess calories without serious comorbidity with body mass index (BMI) of 31.0 to 31.9 in adult   • Dyslipidemia       Social History     Tobacco Use   • Smoking status: Former Smoker   • Smokeless tobacco: Never Used   Substance Use Topics   • Alcohol use: No   • Drug use: Never       Family History   Problem Relation Age of Onset   • Arthritis Mother        The following portions of the patient's history were reviewed and updated as appropriate: allergies, current medications, past family history, past medical history, past social history, past surgical history and problem list.      Current Outpatient Medications:   •  acetaminophen (TYLENOL) 500 MG tablet, Take 500 mg by mouth Every 6 (Six) Hours As Needed for Mild Pain ., Disp: , Rfl:   •  albuterol sulfate  (90 Base) MCG/ACT inhaler, Inhale., Disp: , Rfl:   •  loratadine (Claritin) 10 MG tablet, Take 10 mg by mouth Daily., Disp: , Rfl:   •  omeprazole (priLOSEC) 20 MG capsule, TAKE 1 CAPSULE BY MOUTH ONCE DAILY FOR 90 DAYS, Disp: , Rfl:   •  tiZANidine (ZANAFLEX) 4 MG tablet, Take 4 mg by mouth., Disp: , Rfl:     Review of Systems   Constitutional: Negative for chills and fever.   Cardiovascular: Positive for chest pain. Negative for paroxysmal nocturnal dyspnea.   Respiratory: Negative for cough and shortness of breath.    Skin: Negative for rash.   Gastrointestinal: Negative for abdominal pain and heartburn.   Neurological: Positive for dizziness. Negative for numbness.       Objective   /80 (BP Location: Left arm, Patient Position: Sitting, Cuff Size: Adult)   Pulse 69   Ht 180.3 cm (70.98\")   Wt 101 kg (223 lb)   SpO2 98%   BMI 31.12 kg/m²   Constitutional:       Appearance: Well-developed. Obese.   HENT:      Head: Normocephalic and atraumatic.   Pulmonary:      Effort: Pulmonary effort is normal.      Breath sounds: Normal breath sounds.   Cardiovascular:      Normal rate. Regular rhythm.      Murmurs: There " is no murmur.      No gallop. No click.   Edema:     Peripheral edema absent.   Skin:     General: Skin is warm and dry.   Neurological:      Mental Status: Alert and oriented to person, place, and time.   Psychiatric:         Attention and Perception: Attention normal.         Mood and Affect: Affect is flat.       Procedures      ICD-10-CM ICD-9-CM   1. Bradycardia, sinus  R00.1 427.89   2. Chest pain, unspecified type  R07.9 786.50   3. Dizziness  R42 780.4   4. PRASANTH (obstructive sleep apnea)  G47.33 327.23   5. Dyslipidemia  E78.5 272.4   6. Class 1 obesity due to excess calories without serious comorbidity with body mass index (BMI) of 31.0 to 31.9 in adult  E66.09 278.00    Z68.31 V85.31         Assessment/Plan:  1.  Sinus bradycardia: Heart rate of 69 bpm in clinic today with average heart rate of 72 bpm on recent Holter monitor.  No evidence of any clinically significant bradycardia that would contribute to any of his symptoms.  He also had a normal chronotropic response on stress testing.  Offer reassurance.       2.  Chest pain: Atypical symptoms consistent with noncardiac etiology.  Exercise stress echocardiogram from 9/22/2021 was low risk.  Offered reassurance.     3.  Dizziness: No evidence of any cardiac etiology to his symptoms.     4.  Obstructive sleep apnea: Continue CPAP.     5.  Dyslipidemia: Mildly elevated LDL cholesterol but otherwise unremarkable lipid panel.  He is not in a statin benefit group.  Counseled on lifestyle modification.    6.  Obesity: Patient's Body mass index is 31.12 kg/m². indicating that he is obese (BMI >30). Obesity-related health conditions include the following: obstructive sleep apnea and dyslipidemias. Obesity is worsening. BMI is is above average; BMI management plan is completed. We discussed portion control and increasing exercise.

## 2022-03-06 ENCOUNTER — HOSPITAL ENCOUNTER (EMERGENCY)
Facility: HOSPITAL | Age: 37
Discharge: HOME | End: 2022-03-06
Attending: EMERGENCY MEDICINE | Admitting: EMERGENCY MEDICINE

## 2022-03-06 ENCOUNTER — APPOINTMENT (OUTPATIENT)
Dept: GENERAL RADIOLOGY | Facility: HOSPITAL | Age: 37
End: 2022-03-06

## 2022-03-06 VITALS
BODY MASS INDEX: 27.72 KG/M2 | SYSTOLIC BLOOD PRESSURE: 126 MMHG | RESPIRATION RATE: 20 BRPM | OXYGEN SATURATION: 99 % | WEIGHT: 198 LBS | HEART RATE: 57 BPM | DIASTOLIC BLOOD PRESSURE: 88 MMHG | HEIGHT: 71 IN | TEMPERATURE: 98.1 F

## 2022-03-06 DIAGNOSIS — R07.2 PRECORDIAL PAIN: Primary | ICD-10-CM

## 2022-03-06 DIAGNOSIS — R42 DIZZINESS: ICD-10-CM

## 2022-03-06 LAB
ALBUMIN SERPL-MCNC: 4.8 G/DL (ref 3.5–5.2)
ALBUMIN/GLOB SERPL: 1.9 G/DL
ALP SERPL-CCNC: 47 U/L (ref 39–117)
ALT SERPL W P-5'-P-CCNC: 14 U/L (ref 1–41)
ANION GAP SERPL CALCULATED.3IONS-SCNC: 9 MMOL/L (ref 5–15)
AST SERPL-CCNC: 16 U/L (ref 1–40)
BASOPHILS # BLD AUTO: 0.02 10*3/MM3 (ref 0–0.2)
BASOPHILS NFR BLD AUTO: 0.4 % (ref 0–1.5)
BILIRUB SERPL-MCNC: 0.8 MG/DL (ref 0–1.2)
BUN SERPL-MCNC: 17 MG/DL (ref 6–20)
BUN/CREAT SERPL: 17 (ref 7–25)
CALCIUM SPEC-SCNC: 10.3 MG/DL (ref 8.6–10.5)
CHLORIDE SERPL-SCNC: 106 MMOL/L (ref 98–107)
CO2 SERPL-SCNC: 27 MMOL/L (ref 22–29)
CREAT SERPL-MCNC: 1 MG/DL (ref 0.76–1.27)
D DIMER PPP FEU-MCNC: <0.22 MG/L (FEU) (ref 0–0.5)
DEPRECATED RDW RBC AUTO: 43.8 FL (ref 37–54)
EGFRCR SERPLBLD CKD-EPI 2021: 100 ML/MIN/1.73
EOSINOPHIL # BLD AUTO: 0.08 10*3/MM3 (ref 0–0.4)
EOSINOPHIL NFR BLD AUTO: 1.4 % (ref 0.3–6.2)
ERYTHROCYTE [DISTWIDTH] IN BLOOD BY AUTOMATED COUNT: 13.4 % (ref 12.3–15.4)
GLOBULIN UR ELPH-MCNC: 2.5 GM/DL
GLUCOSE SERPL-MCNC: 106 MG/DL (ref 65–99)
HCT VFR BLD AUTO: 44.5 % (ref 37.5–51)
HGB BLD-MCNC: 14.5 G/DL (ref 13–17.7)
IMM GRANULOCYTES # BLD AUTO: 0.01 10*3/MM3 (ref 0–0.05)
IMM GRANULOCYTES NFR BLD AUTO: 0.2 % (ref 0–0.5)
LYMPHOCYTES # BLD AUTO: 2.43 10*3/MM3 (ref 0.7–3.1)
LYMPHOCYTES NFR BLD AUTO: 43.6 % (ref 19.6–45.3)
MCH RBC QN AUTO: 29 PG (ref 26.6–33)
MCHC RBC AUTO-ENTMCNC: 32.6 G/DL (ref 31.5–35.7)
MCV RBC AUTO: 89 FL (ref 79–97)
MONOCYTES # BLD AUTO: 0.39 10*3/MM3 (ref 0.1–0.9)
MONOCYTES NFR BLD AUTO: 7 % (ref 5–12)
NEUTROPHILS NFR BLD AUTO: 2.64 10*3/MM3 (ref 1.7–7)
NEUTROPHILS NFR BLD AUTO: 47.4 % (ref 42.7–76)
NRBC BLD AUTO-RTO: 0 /100 WBC (ref 0–0.2)
NT-PROBNP SERPL-MCNC: 24.6 PG/ML (ref 0–450)
PLATELET # BLD AUTO: 249 10*3/MM3 (ref 140–450)
PMV BLD AUTO: 9.1 FL (ref 6–12)
POTASSIUM SERPL-SCNC: 4 MMOL/L (ref 3.5–5.2)
PROT SERPL-MCNC: 7.3 G/DL (ref 6–8.5)
RBC # BLD AUTO: 5 10*6/MM3 (ref 4.14–5.8)
S PYO AG THROAT QL: NEGATIVE
SARS-COV-2 RNA PNL SPEC NAA+PROBE: NOT DETECTED
SODIUM SERPL-SCNC: 142 MMOL/L (ref 136–145)
TROPONIN T SERPL-MCNC: <0.01 NG/ML (ref 0–0.03)
WBC NRBC COR # BLD: 5.57 10*3/MM3 (ref 3.4–10.8)

## 2022-03-06 PROCEDURE — 85379 FIBRIN DEGRADATION QUANT: CPT | Performed by: EMERGENCY MEDICINE

## 2022-03-06 PROCEDURE — 84484 ASSAY OF TROPONIN QUANT: CPT | Performed by: EMERGENCY MEDICINE

## 2022-03-06 PROCEDURE — 93010 ELECTROCARDIOGRAM REPORT: CPT | Performed by: INTERNAL MEDICINE

## 2022-03-06 PROCEDURE — 71045 X-RAY EXAM CHEST 1 VIEW: CPT

## 2022-03-06 PROCEDURE — 83880 ASSAY OF NATRIURETIC PEPTIDE: CPT | Performed by: EMERGENCY MEDICINE

## 2022-03-06 PROCEDURE — 99283 EMERGENCY DEPT VISIT LOW MDM: CPT

## 2022-03-06 PROCEDURE — 87880 STREP A ASSAY W/OPTIC: CPT | Performed by: EMERGENCY MEDICINE

## 2022-03-06 PROCEDURE — 87635 SARS-COV-2 COVID-19 AMP PRB: CPT | Performed by: EMERGENCY MEDICINE

## 2022-03-06 PROCEDURE — 80053 COMPREHEN METABOLIC PANEL: CPT | Performed by: EMERGENCY MEDICINE

## 2022-03-06 PROCEDURE — 85025 COMPLETE CBC W/AUTO DIFF WBC: CPT | Performed by: EMERGENCY MEDICINE

## 2022-03-06 PROCEDURE — 87081 CULTURE SCREEN ONLY: CPT | Performed by: EMERGENCY MEDICINE

## 2022-03-06 PROCEDURE — 93005 ELECTROCARDIOGRAM TRACING: CPT | Performed by: EMERGENCY MEDICINE

## 2022-03-06 NOTE — ED PROVIDER NOTES
Emergency Medicine Provider Note    Subjective:    HISTORY OF PRESENT ILLNESS     This is a very pleasant 36-year-old gentleman with a past medical history of hypertension, arthritis, asthma who presents with sharp left-sided chest pain that has been constant since Friday.  Moderate in severity.  Radiates up his neck.  Worse with taking a deep breath.  Associated with chills but no fevers.  No nausea or vomiting.  No cough.  Does state he has a sore throat.  Also accompanied with dizziness.  Patient has been seen by cardiology for these symptoms before.  He was most recently seen in October.  His sinus bradycardia was felt not to have clinical significance to contribute to symptoms.  Chest pain was felt to be atypical and his exercise stress echo and September which was reassuring.  Dyslipidemia was not felt to be in a statin benefit group.        History is obtained from the patient and chart review.       Review of Systems: All other systems are reviewed and are negative other than noted in the HPI.    Past Medical History:  Past Medical History:   Diagnosis Date   • Arthritis    • Asthma    • GERD (gastroesophageal reflux disease)    • Hypertension    • Sleep apnea     CPAP       Allergies:  Allergies   Allergen Reactions   • Mobic [Meloxicam] Shortness Of Breath     Chest pain with shortness of breath    • Nsaids Shortness Of Breath and Other (See Comments)     Chest pain       Past Surgical History:  Past Surgical History:   Procedure Laterality Date   • CERVICAL FUSION     • EYE SURGERY     • FRACTURE SURGERY Right     Right arm    • KNEE ARTHROSCOPY Right    • TONSILLECTOMY AND ADENOIDECTOMY     • ULNAR NERVE DECOMPRESSION Right 7/15/2020    Procedure: right CUBITAL TUNNEL RELEASE;  Surgeon: Jean-Paul Hardin MD;  Location: [facility];  Service: Neurosurgery;  Laterality: Right;       Family History:  Family History   Problem Relation Age of Onset   • Arthritis Mother        Social History:  Social History      Socioeconomic History   • Marital status:    Tobacco Use   • Smoking status: Former Smoker   • Smokeless tobacco: Never Used   Substance and Sexual Activity   • Alcohol use: No   • Drug use: Never   • Sexual activity: Defer       Home Medications:  Prior to Admission medications    Medication Sig Start Date End Date Taking? Authorizing Provider   acetaminophen (TYLENOL) 500 MG tablet Take 500 mg by mouth Every 6 (Six) Hours As Needed for Mild Pain .    Jolie Spencer MD   albuterol sulfate  (90 Base) MCG/ACT inhaler Inhale.    Jolie Spencer MD   loratadine (Claritin) 10 MG tablet Take 10 mg by mouth Daily.    Jolie Spencer MD   omeprazole (priLOSEC) 20 MG capsule TAKE 1 CAPSULE BY MOUTH ONCE DAILY FOR 90 DAYS 1/9/20   Jolie Spencer MD   tiZANidine (ZANAFLEX) 4 MG tablet Take 4 mg by mouth. 3/26/21   Jolie Spencer MD         Objective:    PHYSICAL EXAM     Vitals:   Vitals:    03/06/22 0826   BP:    Pulse: 57   Resp:    Temp:    SpO2: 99%     GENERAL: Well appearing, in no acute distress.   HEENT: Moist mucous membranes, oropharynx clear without lesions, exudates, thrush.   EYES: No scleral icterus, conjunctivae clear.   NECK: No cervical lymphadenopathy, no stiffness.  CARDIAC: Normal rate, regular rhythm, no murmurs, 2+ peripheral pulses in all four extremities, normal capillary refill.   PULMONARY: Normal work of breathing on room air, lungs are clear to auscultation bilaterally without wheezes, crackles, rhonchi.  ABDOMINAL: Normal bowel sounds, abdomen is soft, non-tender, non-distended, no hepatomegaly or splenomegaly.   MUSCULOSKELETAL: Normal range of motion, no lower extremity edema.  NEUROLOGIC: Alert and oriented x 3, EOM grossly intact and moves all four extremities with normal strength.  SKIN: Warm and dry without rashes.   PSYCHIATRIC: Mood and affect are normal.     PROCEDURES     Procedures    LAB AND RADIOLOGY RESULTS     Lab Results (last 24  hours)     Procedure Component Value Units Date/Time    CBC & Differential [[id number]]  (Normal) Collected: 03/06/22 0742    Specimen: Blood Updated: 03/06/22 0751    Narrative:      The following orders were created for panel order CBC & Differential.  Procedure                               Abnormality         Status                     ---------                               -----------         ------                     CBC Auto Differential[[id number]]        Normal              Final result                 Please view results for these tests on the individual orders.    Comprehensive Metabolic Panel [[id number]]  (Abnormal) Collected: 03/06/22 0742    Specimen: Blood Updated: 03/06/22 0810     Glucose 106 mg/dL      BUN 17 mg/dL      Creatinine 1.00 mg/dL      Sodium 142 mmol/L      Potassium 4.0 mmol/L      Chloride 106 mmol/L      CO2 27.0 mmol/L      Calcium 10.3 mg/dL      Total Protein 7.3 g/dL      Albumin 4.80 g/dL      ALT (SGPT) 14 U/L      AST (SGOT) 16 U/L      Alkaline Phosphatase 47 U/L      Total Bilirubin 0.8 mg/dL      Globulin 2.5 gm/dL      A/G Ratio 1.9 g/dL      BUN/Creatinine Ratio 17.0     Anion Gap 9.0 mmol/L      eGFR 100.0 mL/min/1.73      Comment: National Kidney Foundation and American Society of Nephrology (ASN) Task Force recommended calculation based on the Chronic Kidney Disease Epidemiology Collaboration (CKD-EPI) equation refit without adjustment for race.       Narrative:      GFR Normal >60  Chronic Kidney Disease <60  Kidney Failure <15      BNP [[id number]]  (Normal) Collected: 03/06/22 0742    Specimen: Blood Updated: 03/06/22 0808     proBNP 24.6 pg/mL     Narrative:      Among patients with dyspnea, NT-proBNP is highly sensitive for the detection of acute congestive heart failure. In addition NT-proBNP of <300 pg/ml effectively rules out acute congestive heart failure with 99% negative predictive value.    Results may be falsely decreased if patient taking Biotin.       D-dimer, Quantitative [[id number]]  (Normal) Collected: 03/06/22 0742    Specimen: Blood Updated: 03/06/22 0804     D-Dimer, Quantitative <0.22 mg/L (FEU)     Narrative:      Reference Range is 0-0.50 mg/L FEU. However, results <0.50 mg/L FEU tends to rule out DVT or PE. Results >0.50 mg/L FEU are not useful in predicting absence or presence of DVT or PE.      Troponin [[id number]]  (Normal) Collected: 03/06/22 0742    Specimen: Blood Updated: 03/06/22 0809     Troponin T <0.010 ng/mL     Narrative:      Troponin T Reference Range:  <= 0.03 ng/mL-   Negative for AMI  >0.03 ng/mL-     Abnormal for myocardial necrosis.  Clinicians would have to utilize clinical acumen, EKG, Troponin and serial changes to determine if it is an Acute Myocardial Infarction or myocardial injury due to an underlying chronic condition.       Results may be falsely decreased if patient taking Biotin.      CBC Auto Differential [[id number]]  (Normal) Collected: 03/06/22 0742    Specimen: Blood Updated: 03/06/22 0751     WBC 5.57 10*3/mm3      RBC 5.00 10*6/mm3      Hemoglobin 14.5 g/dL      Hematocrit 44.5 %      MCV 89.0 fL      MCH 29.0 pg      MCHC 32.6 g/dL      RDW 13.4 %      RDW-SD 43.8 fl      MPV 9.1 fL      Platelets 249 10*3/mm3      Neutrophil % 47.4 %      Lymphocyte % 43.6 %      Monocyte % 7.0 %      Eosinophil % 1.4 %      Basophil % 0.4 %      Immature Grans % 0.2 %      Neutrophils, Absolute 2.64 10*3/mm3      Lymphocytes, Absolute 2.43 10*3/mm3      Monocytes, Absolute 0.39 10*3/mm3      Eosinophils, Absolute 0.08 10*3/mm3      Basophils, Absolute 0.02 10*3/mm3      Immature Grans, Absolute 0.01 10*3/mm3      nRBC 0.0 /100 WBC     COVID-19,[employer] IN-HOUSE,Nasal Swab No Transport Media 3-4 HR TAT - Swab, Nasal Cavity [[id number]]  (Normal) Collected: 03/06/22 0743    Specimen: Swab from Nasal Cavity Updated: 03/06/22 0825     COVID19 Not Detected    Narrative:      Fact sheet for providers:  https://www.fda.gov/media/024766/download     Fact sheet for patients: https://www.fda.gov/media/529337/download    Test performed by PCR.    Consider negative results in combination with clinical observations, patient history, and epidemiological information.    Rapid Strep A Screen - Swab, Throat [[id number]]  (Normal) Collected: 03/06/22 0743    Specimen: Swab from Throat Updated: 03/06/22 0802     Strep A Ag Negative    Beta Strep Culture, Throat - Swab, Throat [[id number]] Collected: 03/06/22 0743    Specimen: Swab from Throat Updated: 03/06/22 0802          No results found.    ED course:    Medications - No data to display       Amount and/or complexity of data reviewed:    • Clinical lab tests ordered and reviewed.  • Tests in the radiology section ordered and reviewed.  • Independent visualization of imaging, tracing, or specimen is remarkable for an EKG was sinus bradycardia at a rate of 57 with no ST elevation or depression concerning for acute ischemia, narrow QRS, ND within normal limits, QT within normal limits, no Wellens, no Brugada.        Risk of significant complications, morbidity, and/or mortality.    •  Presenting problem: high  •  Diagnostic procedures: moderate  •  Management options: high        MEDICAL DECISION MAKING     Patient presents with chest pain, dizziness, sore throat, varying blood pressures. Upon arrival to the Emergency Department patient is in no acute distress vital signs are reassuring.  IV access is obtained and labs are sent.  He is evaluated with a chest x-ray.Work appears reassuring.  Chest x-ray interpreted by me with no cardiomegaly, no pleural effusion, no pneumothorax, no focal infiltrates, midline trachea.  EKG is reassuring.  CBC, BMP, troponin, D-dimer, strep, Covid are all reassuring.  CMP with no gross electrolyte abnormalities, no signs of kidney injury, no elevation the anion gap.  Patient is on the monitor throughout his ED stay here with close observation by  myself.  No arrhythmias noted.  He is in a sinus rhythm, intermittently becomes bradycardic to the 50s but this appears to be near his baseline.  I have discussed the findings with the patient and the person at bedside with the patient.  I explained we can do a Holter monitor again.  It is a weekend so I cannot get one now but I have ordered one as an outpatient.  He does have a primary care provider he is able to follow-up with this week.  He understands to return here for any new or worsening symptoms. Low concern for acute coronary syndrome as HEART score is 1([id number]) and troponin is within normal limits after days of persistent symptoms. Further reassured as he has a negative stress test in the last one year. Low concern for cardiac tamponade with no tachycardia, no hypotension, no narrow pulse pressure, no muffled heart sounds, no elevated JVD, and no known risk factors for pericardial effusion. Low concern for aortic dissection with no thunderclap in onset pain, no neurologic symptoms, no widened mediastinum on CXR, no tearing or ripping pain, and no pulse deficit and D-dimer is negative.  Low concern for pneumonia with no fevers, chills, or cough, and no findings of pneumonia on CXR or physical exam. Low concern for pulmonary embolism as they are low risk via Well's criteria and D-dimer is negative via age adjusted criteria. Low concern for myocarditis with no fever, no tachycardia, and no elevated troponin. Low concern for esophageal tear with no recent severe vomiting, no history of recent esophageal instrumentation, no crepitus on exam, or pneumomediastinum on CXR. Low concern for pneumothorax with no signs of this on physical exam or CXR. I discussed all of the findings with the patient and he verbalized understanding. I explained that there is always diagnostic uncertainty in the ER and this could be an early presentation of a process not detected at this time so he should return for any new or  worsening symptoms and should follow up rapidly with his primary care provider for further evaluation and management. he is discharged in good condition with reassuring vitals and is given commonsense return precautions which he verbalizes understanding of.             Diagnosis:    Final diagnoses:   Precordial pain   Dizziness         ED Disposition:     ED Disposition     ED Disposition   Discharge    Condition   Stable    Comment   --             Nasra Eduardo MD  [address] [facility] [address]  [phone]    Schedule an appointment as soon as possible for a visit in 2 days           Medication List      No changes were made to your prescriptions during this visit.              Philip Pineda MD  03/06/22 6103

## 2022-03-06 NOTE — EXTERNAL PATIENT INSTRUCTIONS
Patient Education   Table of Contents       Dizziness       Nonspecific Chest Pain, Adult     To view videos and all your education online visit,   [email or web address]   or scan this QR code with your smartphone.                  Dizziness     Dizziness is a common problem. It is a feeling of unsteadiness or light-headedness. You may feel like you are about to faint. Dizziness can lead to injury if you stumble or fall. Anyone can become dizzy, but dizziness is more common in older adults. This condition can be caused by a number of things, including medicines, dehydration, or illness.   Follow these instructions at home:   Eating and drinking         Drink enough fluid to keep your urine clear or pale yellow. This helps to keep you from becoming dehydrated. Try to drink more clear fluids, such as water.      Do not  drink alcohol.       Limit your caffeine intake if told to do so by your health care provider. Check ingredients and nutrition facts to see if a food or beverage contains caffeine.       Limit your salt (sodium) intake if told to do so by your health care provider. Check ingredients and nutrition facts to see if a food or beverage contains sodium.     Activity        Avoid making quick movements.       Rise slowly from chairs and steady yourself until you feel okay.       In the morning, first sit up on the side of the bed. When you feel okay, stand slowly while you hold onto something until you know that your balance is fine.       If you need to  one place for a long time, move your legs often. Tighten and relax the muscles in your legs while you are standing.      Do not  drive or use heavy machinery if you feel dizzy.       Avoid bending down if you feel dizzy. Place items in your home so that they are easy for you to reach without leaning over.     Lifestyle        Do not  use any products that contain nicotine or tobacco, such as cigarettes and e-cigarettes. If you need help  quitting, ask your health care provider.       Try to reduce your stress level by using methods such as yoga or meditation. Talk with your health care provider if you need help to manage your stress.     General instructions         Watch your dizziness for any changes.       Take over-the-counter and prescription medicines only as told by your health care provider. Talk with your health care provider if you think that your dizziness is caused by a medicine that you are taking.       Tell a friend or a family member that you are feeling dizzy. If he or she notices any changes in your behavior, have this person call your health care provider.       Keep all follow-up visits as told by your health care provider. This is important.     Contact a health care provider if:         Your dizziness does not go away.       Your dizziness or light-headedness gets worse.       You feel nauseous.       You have reduced hearing.       You have new symptoms.       You are unsteady on your feet or you feel like the room is spinning.     Get help right away if:         You vomit or have diarrhea and are unable to eat or drink anything.       You have problems talking, walking, swallowing, or using your arms, hands, or legs.       You feel generally weak.       You are not thinking clearly or you have trouble forming sentences. It may take a friend or family member to notice this.       You have chest pain, abdominal pain, shortness of breath, or sweating.       Your vision changes.       You have any bleeding.       You have a severe headache.       You have neck pain or a stiff neck.       You have a fever.     These symptoms may represent a serious problem that is an emergency. Do not wait to see if the symptoms will go away. Get medical help right away. Call your local emergency services (911 in the [place] Do not drive yourself to the hospital.   Summary         Dizziness is a feeling of unsteadiness or light-headedness. This  condition can be caused by a number of things, including medicines, dehydration, or illness.       Anyone can become dizzy, but dizziness is more common in older adults.       Drink enough fluid to keep your urine clear or pale yellow. Do not  drink alcohol.       Avoid making quick movements if you feel dizzy. Monitor your dizziness for any changes.     This information is not intended to replace advice given to you by your health care provider. Make sure you discuss any questions you have with your health care provider.     Document Released: 06/13/2002Document Revised: 12/21/2018Document Reviewed: 01/20/2018     [employer] Patient Education ? 2021 [employer]         Nonspecific Chest Pain, Adult     Chest pain can be caused by many different conditions. It can be caused by a condition that is life-threatening and requires treatment right away. It can also be caused by something that is not life-threatening. If you have chest pain, it can be hard to know the difference, so it is important to get help right away to make sure that you do not have a serious condition.    Some life-threatening causes of chest pain include:       Heart attack.       A tear in the body's main blood vessel (aortic dissection).       Inflammation around your heart (pericarditis).       A problem in the lungs, such as a blood clot (pulmonary embolism) or a collapsed lung (pneumothorax).      Some non life-threatening causes of chest pain include:       Heartburn.       Anxiety or stress.       Damage to the bones, muscles, and cartilage that make up your chest wall.       Pneumonia or bronchitis.       Shingles infection (varicella-zoster virus).      Chest pain can feel like:       Pain or discomfort on the surface of your chest or deep in your chest.       Crushing, pressure, aching, or squeezing pain.       Burning or tingling.       Dull or sharp pain that is worse when you move, cough, or take a deep breath.       Pain or discomfort  that is also felt in your back, neck, jaw, shoulder, or arm, or pain that spreads to any of these areas.     Your chest pain may come and go. It may also be constant. Your health care provider will do lab tests and other studies to find the cause of your pain. Treatment will depend on the cause of your chest pain.   Follow these instructions at home:   Medicines         Take over-the-counter and prescription medicines only as told by your health care provider.       If you were prescribed an antibiotic, take it as told by your health care provider. Do not  stop taking the antibiotic even if you start to feel better.     Lifestyle            Rest as directed by your health care provider.      Do not  use any products that contain nicotine or tobacco, such as cigarettes and e-cigarettes. If you need help quitting, ask your health care provider.      Do not  drink alcohol.      Make healthy lifestyle choices as recommended. These may include:       Getting regular exercise. Ask your health care provider to suggest some activities that are safe for you.       Eating a heart-healthy diet. This includes plenty of fresh fruits and vegetables, whole grains, low-fat (lean) protein, and low-fat dairy products. A dietitian can help you find healthy eating options.       Maintaining a healthy weight.       Managing any other health conditions you have, such as high blood pressure (hypertension) or diabetes.       Reducing stress, such as with yoga or relaxation techniques.     General instructions         Pay attention to any changes in your symptoms. Tell your health care provider about them or any new symptoms.       Avoid any activities that cause chest pain.       Keep all follow-up visits as told by your health care provider. This is important. This includes visits for any further testing if your chest pain does not go away.       Contact a health care provider if:         Your chest pain does not go away.       You feel  depressed.       You have a fever.     Get help right away if:         Your chest pain gets worse.       You have a cough that gets worse, or you cough up blood.       You have severe pain in your abdomen.       You faint.       You have sudden, unexplained chest discomfort.       You have sudden, unexplained discomfort in your arms, back, neck, or jaw.       You have shortness of breath at any time.       You suddenly start to sweat, or your skin gets clammy.       You feel nausea or you vomit.       You suddenly feel lightheaded or dizzy.       You have severe weakness, or unexplained weakness or fatigue.       Your heart begins to beat quickly, or it feels like it is skipping beats.     These symptoms may represent a serious problem that is an emergency. Do not wait to see if the symptoms will go away. Get medical help right away. Call your local emergency services (911 in the [place] Do not drive yourself to the hospital.   Summary         Chest pain can be caused by a condition that is serious and requires urgent treatment. It may also be caused by something that is not life-threatening.       If you have chest pain, it is very important to see your health care provider. Your health care provider may do lab tests and other studies to find the cause of your pain.       Follow your health care provider's instructions on taking medicines, making lifestyle changes, and getting emergency treatment if symptoms become worse.       Keep all follow-up visits as told by your health care provider. This includes visits for any further testing if your chest pain does not go away.     This information is not intended to replace advice given to you by your health care provider. Make sure you discuss any questions you have with your health care provider.     Document Released: 09/27/2006Document Revised: 06/20/2019Document Reviewed: 06/20/2019     [employer] Patient Education ? 2021 [employer]

## 2022-03-08 LAB
BACTERIA SPEC AEROBE CULT: NORMAL
QT INTERVAL: 464 MS
QTC INTERVAL: 451 MS

## 2022-03-16 ENCOUNTER — HOSPITAL ENCOUNTER (OUTPATIENT)
Dept: CARDIOLOGY | Facility: HOSPITAL | Age: 37
Discharge: HOME | End: 2022-03-16
Admitting: EMERGENCY MEDICINE

## 2022-03-16 PROCEDURE — 93246 EXT ECG>7D<15D RECORDING: CPT

## 2022-04-08 ENCOUNTER — TELEPHONE (OUTPATIENT)
Dept: EMERGENCY DEPT | Facility: HOSPITAL | Age: 37
End: 2022-04-08

## 2022-04-08 ENCOUNTER — TRANSCRIBE ORDERS (OUTPATIENT)
Dept: LAB | Facility: HOSPITAL | Age: 37
End: 2022-04-08

## 2022-04-08 DIAGNOSIS — Z11.59 SCREENING FOR VIRAL DISEASE: Primary | ICD-10-CM

## 2022-04-08 LAB
MAXIMAL PREDICTED HEART RATE: 184 BPM
STRESS TARGET HR: 156 BPM

## 2022-04-08 PROCEDURE — 93248 EXT ECG>7D<15D REV&INTERPJ: CPT | Performed by: INTERNAL MEDICINE

## 2022-04-11 ENCOUNTER — PRE-ADMISSION TESTING (OUTPATIENT)
Dept: PREADMISSION TESTING | Facility: HOSPITAL | Age: 37
End: 2022-04-11

## 2022-04-11 ENCOUNTER — LAB (OUTPATIENT)
Dept: LAB | Facility: HOSPITAL | Age: 37
End: 2022-04-11

## 2022-04-11 VITALS
HEART RATE: 58 BPM | WEIGHT: 197.09 LBS | HEIGHT: 71 IN | RESPIRATION RATE: 16 BRPM | BODY MASS INDEX: 27.59 KG/M2 | OXYGEN SATURATION: 100 % | SYSTOLIC BLOOD PRESSURE: 119 MMHG | DIASTOLIC BLOOD PRESSURE: 66 MMHG

## 2022-04-11 LAB
ALBUMIN SERPL-MCNC: 4.6 G/DL (ref 3.5–5.2)
ALBUMIN/GLOB SERPL: 2.2 G/DL
ALP SERPL-CCNC: 47 U/L (ref 39–117)
ALT SERPL W P-5'-P-CCNC: 15 U/L (ref 1–41)
ANION GAP SERPL CALCULATED.3IONS-SCNC: 10 MMOL/L (ref 5–15)
AST SERPL-CCNC: 17 U/L (ref 1–40)
BASOPHILS # BLD AUTO: 0.04 10*3/MM3 (ref 0–0.2)
BASOPHILS NFR BLD AUTO: 0.5 % (ref 0–1.5)
BILIRUB SERPL-MCNC: 0.4 MG/DL (ref 0–1.2)
BUN SERPL-MCNC: 19 MG/DL (ref 6–20)
BUN/CREAT SERPL: 19 (ref 7–25)
CALCIUM SPEC-SCNC: 9.7 MG/DL (ref 8.6–10.5)
CHLORIDE SERPL-SCNC: 105 MMOL/L (ref 98–107)
CO2 SERPL-SCNC: 26 MMOL/L (ref 22–29)
CREAT SERPL-MCNC: 1 MG/DL (ref 0.76–1.27)
DEPRECATED RDW RBC AUTO: 41.4 FL (ref 37–54)
EGFRCR SERPLBLD CKD-EPI 2021: 100 ML/MIN/1.73
EOSINOPHIL # BLD AUTO: 0.13 10*3/MM3 (ref 0–0.4)
EOSINOPHIL NFR BLD AUTO: 1.7 % (ref 0.3–6.2)
ERYTHROCYTE [DISTWIDTH] IN BLOOD BY AUTOMATED COUNT: 13.1 % (ref 12.3–15.4)
GLOBULIN UR ELPH-MCNC: 2.1 GM/DL
GLUCOSE SERPL-MCNC: 99 MG/DL (ref 65–99)
HCT VFR BLD AUTO: 42.4 % (ref 37.5–51)
HGB BLD-MCNC: 14.2 G/DL (ref 13–17.7)
IMM GRANULOCYTES # BLD AUTO: 0 10*3/MM3 (ref 0–0.05)
IMM GRANULOCYTES NFR BLD AUTO: 0 % (ref 0–0.5)
LYMPHOCYTES # BLD AUTO: 2.97 10*3/MM3 (ref 0.7–3.1)
LYMPHOCYTES NFR BLD AUTO: 37.8 % (ref 19.6–45.3)
MCH RBC QN AUTO: 29.1 PG (ref 26.6–33)
MCHC RBC AUTO-ENTMCNC: 33.5 G/DL (ref 31.5–35.7)
MCV RBC AUTO: 86.9 FL (ref 79–97)
MONOCYTES # BLD AUTO: 0.53 10*3/MM3 (ref 0.1–0.9)
MONOCYTES NFR BLD AUTO: 6.7 % (ref 5–12)
NEUTROPHILS NFR BLD AUTO: 4.19 10*3/MM3 (ref 1.7–7)
NEUTROPHILS NFR BLD AUTO: 53.3 % (ref 42.7–76)
NRBC BLD AUTO-RTO: 0 /100 WBC (ref 0–0.2)
PLATELET # BLD AUTO: 265 10*3/MM3 (ref 140–450)
PMV BLD AUTO: 9.3 FL (ref 6–12)
POTASSIUM SERPL-SCNC: 3.9 MMOL/L (ref 3.5–5.2)
PROT SERPL-MCNC: 6.7 G/DL (ref 6–8.5)
RBC # BLD AUTO: 4.88 10*6/MM3 (ref 4.14–5.8)
SARS-COV-2 ORF1AB RESP QL NAA+PROBE: NOT DETECTED
SODIUM SERPL-SCNC: 141 MMOL/L (ref 136–145)
WBC NRBC COR # BLD: 7.86 10*3/MM3 (ref 3.4–10.8)

## 2022-04-11 PROCEDURE — 80053 COMPREHEN METABOLIC PANEL: CPT

## 2022-04-11 PROCEDURE — C9803 HOPD COVID-19 SPEC COLLECT: HCPCS | Performed by: SPECIALIST

## 2022-04-11 PROCEDURE — 36415 COLL VENOUS BLD VENIPUNCTURE: CPT

## 2022-04-11 PROCEDURE — U0004 COV-19 TEST NON-CDC HGH THRU: HCPCS | Performed by: SPECIALIST

## 2022-04-11 PROCEDURE — 85025 COMPLETE CBC W/AUTO DIFF WBC: CPT

## 2022-04-13 ENCOUNTER — ANESTHESIA EVENT (OUTPATIENT)
Dept: PERIOP | Facility: HOSPITAL | Age: 37
End: 2022-04-13

## 2022-04-13 ENCOUNTER — HOSPITAL ENCOUNTER (OUTPATIENT)
Facility: HOSPITAL | Age: 37
Setting detail: HOSPITAL OUTPATIENT SURGERY
Discharge: HOME | End: 2022-04-13
Attending: SPECIALIST | Admitting: SPECIALIST

## 2022-04-13 ENCOUNTER — ANESTHESIA (OUTPATIENT)
Dept: PERIOP | Facility: HOSPITAL | Age: 37
End: 2022-04-13

## 2022-04-13 VITALS
TEMPERATURE: 97 F | DIASTOLIC BLOOD PRESSURE: 78 MMHG | RESPIRATION RATE: 16 BRPM | OXYGEN SATURATION: 100 % | HEART RATE: 49 BPM | SYSTOLIC BLOOD PRESSURE: 134 MMHG

## 2022-04-13 DIAGNOSIS — K82.9 GALLBLADDER DISEASE: ICD-10-CM

## 2022-04-13 PROCEDURE — 25010000002 FENTANYL CITRATE (PF) 250 MCG/5ML SOLUTION: Performed by: NURSE ANESTHETIST, CERTIFIED REGISTERED

## 2022-04-13 PROCEDURE — 25010000002 PROPOFOL 10 MG/ML EMULSION: Performed by: NURSE ANESTHETIST, CERTIFIED REGISTERED

## 2022-04-13 PROCEDURE — 25010000002 DEXAMETHASONE PER 1 MG: Performed by: NURSE ANESTHETIST, CERTIFIED REGISTERED

## 2022-04-13 PROCEDURE — 25010000002 MIDAZOLAM PER 1 MG: Performed by: ANESTHESIOLOGY

## 2022-04-13 PROCEDURE — 25010000002 FENTANYL CITRATE (PF) 100 MCG/2ML SOLUTION: Performed by: NURSE ANESTHETIST, CERTIFIED REGISTERED

## 2022-04-13 PROCEDURE — 25010000002 FENTANYL CITRATE (PF) 50 MCG/ML SOLUTION: Performed by: ANESTHESIOLOGY

## 2022-04-13 PROCEDURE — 25010000002 ONDANSETRON PER 1 MG: Performed by: NURSE ANESTHETIST, CERTIFIED REGISTERED

## 2022-04-13 PROCEDURE — 88304 TISSUE EXAM BY PATHOLOGIST: CPT | Performed by: SPECIALIST

## 2022-04-13 RX ORDER — FENTANYL CITRATE 50 UG/ML
25 INJECTION, SOLUTION INTRAMUSCULAR; INTRAVENOUS
Status: DISCONTINUED | OUTPATIENT
Start: 2022-04-13 | End: 2022-04-13 | Stop reason: HOSPADM

## 2022-04-13 RX ORDER — SODIUM CHLORIDE, SODIUM LACTATE, POTASSIUM CHLORIDE, CALCIUM CHLORIDE 600; 310; 30; 20 MG/100ML; MG/100ML; MG/100ML; MG/100ML
1000 INJECTION, SOLUTION INTRAVENOUS CONTINUOUS
Status: DISCONTINUED | OUTPATIENT
Start: 2022-04-13 | End: 2022-04-13 | Stop reason: HOSPADM

## 2022-04-13 RX ORDER — LIDOCAINE HYDROCHLORIDE 10 MG/ML
0.5 INJECTION, SOLUTION EPIDURAL; INFILTRATION; INTRACAUDAL; PERINEURAL ONCE AS NEEDED
Status: DISCONTINUED | OUTPATIENT
Start: 2022-04-13 | End: 2022-04-13 | Stop reason: HOSPADM

## 2022-04-13 RX ORDER — MAGNESIUM HYDROXIDE 1200 MG/15ML
LIQUID ORAL AS NEEDED
Status: DISCONTINUED | OUTPATIENT
Start: 2022-04-13 | End: 2022-04-13 | Stop reason: HOSPADM

## 2022-04-13 RX ORDER — LABETALOL HYDROCHLORIDE 5 MG/ML
5 INJECTION, SOLUTION INTRAVENOUS
Status: DISCONTINUED | OUTPATIENT
Start: 2022-04-13 | End: 2022-04-13 | Stop reason: HOSPADM

## 2022-04-13 RX ORDER — ONDANSETRON 2 MG/ML
INJECTION INTRAMUSCULAR; INTRAVENOUS AS NEEDED
Status: DISCONTINUED | OUTPATIENT
Start: 2022-04-13 | End: 2022-04-13 | Stop reason: SURG

## 2022-04-13 RX ORDER — SODIUM CHLORIDE 9 MG/ML
INJECTION, SOLUTION INTRAVENOUS AS NEEDED
Status: DISCONTINUED | OUTPATIENT
Start: 2022-04-13 | End: 2022-04-13 | Stop reason: HOSPADM

## 2022-04-13 RX ORDER — DEXAMETHASONE SODIUM PHOSPHATE 4 MG/ML
INJECTION, SOLUTION INTRA-ARTICULAR; INTRALESIONAL; INTRAMUSCULAR; INTRAVENOUS; SOFT TISSUE AS NEEDED
Status: DISCONTINUED | OUTPATIENT
Start: 2022-04-13 | End: 2022-04-13 | Stop reason: SURG

## 2022-04-13 RX ORDER — FLUMAZENIL 0.1 MG/ML
0.2 INJECTION INTRAVENOUS AS NEEDED
Status: DISCONTINUED | OUTPATIENT
Start: 2022-04-13 | End: 2022-04-13 | Stop reason: HOSPADM

## 2022-04-13 RX ORDER — LIDOCAINE HYDROCHLORIDE 20 MG/ML
INJECTION, SOLUTION EPIDURAL; INFILTRATION; INTRACAUDAL; PERINEURAL AS NEEDED
Status: DISCONTINUED | OUTPATIENT
Start: 2022-04-13 | End: 2022-04-13 | Stop reason: SURG

## 2022-04-13 RX ORDER — ROCURONIUM BROMIDE 10 MG/ML
INJECTION, SOLUTION INTRAVENOUS AS NEEDED
Status: DISCONTINUED | OUTPATIENT
Start: 2022-04-13 | End: 2022-04-13 | Stop reason: SURG

## 2022-04-13 RX ORDER — ONDANSETRON 2 MG/ML
4 INJECTION INTRAMUSCULAR; INTRAVENOUS ONCE AS NEEDED
Status: DISCONTINUED | OUTPATIENT
Start: 2022-04-13 | End: 2022-04-13 | Stop reason: HOSPADM

## 2022-04-13 RX ORDER — OXYCODONE HYDROCHLORIDE AND ACETAMINOPHEN 5; 325 MG/1; MG/1
1 TABLET ORAL ONCE AS NEEDED
Status: DISCONTINUED | OUTPATIENT
Start: 2022-04-13 | End: 2022-04-13 | Stop reason: HOSPADM

## 2022-04-13 RX ORDER — FENTANYL CITRATE 50 UG/ML
INJECTION, SOLUTION INTRAMUSCULAR; INTRAVENOUS AS NEEDED
Status: DISCONTINUED | OUTPATIENT
Start: 2022-04-13 | End: 2022-04-13 | Stop reason: SURG

## 2022-04-13 RX ORDER — OXYCODONE HYDROCHLORIDE AND ACETAMINOPHEN 5; 325 MG/1; MG/1
1 TABLET ORAL EVERY 4 HOURS PRN
Qty: 30 TABLET | Refills: 0 | Status: SHIPPED | OUTPATIENT
Start: 2022-04-13 | End: 2022-09-12

## 2022-04-13 RX ORDER — OXYCODONE AND ACETAMINOPHEN 10; 325 MG/1; MG/1
1 TABLET ORAL ONCE AS NEEDED
Status: DISCONTINUED | OUTPATIENT
Start: 2022-04-13 | End: 2022-04-13 | Stop reason: HOSPADM

## 2022-04-13 RX ORDER — MIDAZOLAM HYDROCHLORIDE 1 MG/ML
1 INJECTION INTRAMUSCULAR; INTRAVENOUS
Status: COMPLETED | OUTPATIENT
Start: 2022-04-13 | End: 2022-04-13

## 2022-04-13 RX ORDER — SODIUM CHLORIDE 0.9 % (FLUSH) 0.9 %
3-10 SYRINGE (ML) INJECTION AS NEEDED
Status: DISCONTINUED | OUTPATIENT
Start: 2022-04-13 | End: 2022-04-13 | Stop reason: HOSPADM

## 2022-04-13 RX ORDER — NALOXONE HCL 0.4 MG/ML
0.4 VIAL (ML) INJECTION AS NEEDED
Status: DISCONTINUED | OUTPATIENT
Start: 2022-04-13 | End: 2022-04-13 | Stop reason: HOSPADM

## 2022-04-13 RX ORDER — OXYCODONE AND ACETAMINOPHEN 7.5; 325 MG/1; MG/1
2 TABLET ORAL EVERY 4 HOURS PRN
Status: DISCONTINUED | OUTPATIENT
Start: 2022-04-13 | End: 2022-04-13 | Stop reason: HOSPADM

## 2022-04-13 RX ORDER — SODIUM CHLORIDE 0.9 % (FLUSH) 0.9 %
3 SYRINGE (ML) INJECTION EVERY 12 HOURS SCHEDULED
Status: DISCONTINUED | OUTPATIENT
Start: 2022-04-13 | End: 2022-04-13 | Stop reason: HOSPADM

## 2022-04-13 RX ORDER — DROPERIDOL 2.5 MG/ML
0.62 INJECTION, SOLUTION INTRAMUSCULAR; INTRAVENOUS ONCE AS NEEDED
Status: DISCONTINUED | OUTPATIENT
Start: 2022-04-13 | End: 2022-04-13 | Stop reason: HOSPADM

## 2022-04-13 RX ORDER — ACETAMINOPHEN 500 MG
1000 TABLET ORAL ONCE
Status: COMPLETED | OUTPATIENT
Start: 2022-04-13 | End: 2022-04-13

## 2022-04-13 RX ORDER — PROPOFOL 10 MG/ML
VIAL (ML) INTRAVENOUS AS NEEDED
Status: DISCONTINUED | OUTPATIENT
Start: 2022-04-13 | End: 2022-04-13 | Stop reason: SURG

## 2022-04-13 RX ORDER — BUPIVACAINE HYDROCHLORIDE AND EPINEPHRINE 2.5; 5 MG/ML; UG/ML
INJECTION, SOLUTION INFILTRATION; PERINEURAL AS NEEDED
Status: DISCONTINUED | OUTPATIENT
Start: 2022-04-13 | End: 2022-04-13 | Stop reason: HOSPADM

## 2022-04-13 RX ORDER — SODIUM CHLORIDE, SODIUM LACTATE, POTASSIUM CHLORIDE, CALCIUM CHLORIDE 600; 310; 30; 20 MG/100ML; MG/100ML; MG/100ML; MG/100ML
100 INJECTION, SOLUTION INTRAVENOUS CONTINUOUS
Status: DISCONTINUED | OUTPATIENT
Start: 2022-04-13 | End: 2022-04-13 | Stop reason: HOSPADM

## 2022-04-13 RX ORDER — SODIUM CHLORIDE 0.9 % (FLUSH) 0.9 %
3 SYRINGE (ML) INJECTION AS NEEDED
Status: DISCONTINUED | OUTPATIENT
Start: 2022-04-13 | End: 2022-04-13 | Stop reason: HOSPADM

## 2022-04-13 RX ADMIN — MIDAZOLAM 1 MG: 1 INJECTION INTRAMUSCULAR; INTRAVENOUS at 09:55

## 2022-04-13 RX ADMIN — FENTANYL CITRATE 25 MCG: 50 INJECTION INTRAMUSCULAR; INTRAVENOUS at 11:59

## 2022-04-13 RX ADMIN — FENTANYL CITRATE 25 MCG: 50 INJECTION INTRAMUSCULAR; INTRAVENOUS at 11:53

## 2022-04-13 RX ADMIN — MIDAZOLAM 1 MG: 1 INJECTION INTRAMUSCULAR; INTRAVENOUS at 09:45

## 2022-04-13 RX ADMIN — FENTANYL CITRATE 25 MCG: 50 INJECTION INTRAMUSCULAR; INTRAVENOUS at 11:32

## 2022-04-13 RX ADMIN — SODIUM CHLORIDE, POTASSIUM CHLORIDE, SODIUM LACTATE AND CALCIUM CHLORIDE: 600; 310; 30; 20 INJECTION, SOLUTION INTRAVENOUS at 11:10

## 2022-04-13 RX ADMIN — OXYCODONE HYDROCHLORIDE AND ACETAMINOPHEN 2 TABLET: 7.5; 325 TABLET ORAL at 11:45

## 2022-04-13 RX ADMIN — ROCURONIUM BROMIDE 25 MG: 10 INJECTION INTRAVENOUS at 10:37

## 2022-04-13 RX ADMIN — ONDANSETRON 4 MG: 2 INJECTION INTRAMUSCULAR; INTRAVENOUS at 10:37

## 2022-04-13 RX ADMIN — FENTANYL CITRATE 100 MCG: 50 INJECTION, SOLUTION INTRAMUSCULAR; INTRAVENOUS at 10:37

## 2022-04-13 RX ADMIN — ACETAMINOPHEN 1000 MG: 500 TABLET, FILM COATED ORAL at 09:45

## 2022-04-13 RX ADMIN — DEXAMETHASONE SODIUM PHOSPHATE 4 MG: 4 INJECTION, SOLUTION INTRA-ARTICULAR; INTRALESIONAL; INTRAMUSCULAR; INTRAVENOUS; SOFT TISSUE at 10:37

## 2022-04-13 RX ADMIN — SODIUM CHLORIDE, POTASSIUM CHLORIDE, SODIUM LACTATE AND CALCIUM CHLORIDE 1000 ML: 600; 310; 30; 20 INJECTION, SOLUTION INTRAVENOUS at 08:22

## 2022-04-13 RX ADMIN — CEFAZOLIN 1 G: 1 INJECTION, POWDER, FOR SOLUTION INTRAMUSCULAR; INTRAVENOUS; PARENTERAL at 10:43

## 2022-04-13 RX ADMIN — FENTANYL CITRATE 25 MCG: 50 INJECTION INTRAMUSCULAR; INTRAVENOUS at 11:39

## 2022-04-13 RX ADMIN — LIDOCAINE HYDROCHLORIDE 50 MG: 20 INJECTION, SOLUTION EPIDURAL; INFILTRATION; INTRACAUDAL; PERINEURAL at 10:37

## 2022-04-13 RX ADMIN — PROPOFOL 200 MG: 10 INJECTION, EMULSION INTRAVENOUS at 10:37

## 2022-04-13 NOTE — OP NOTE
Preoperative diagnosis:  Symptomatic cholelithiasis  Postoperative diagnosis:  Same  Procedure: laparoscopic cholecystectomy  Surgeon: Erin Sterling MD  Anesthesia;  Get loc  Ebl:  Minimal  Ivf:  See anesthesia  Indications:  The patient was laid supine. The abdomen was prepped and draped.  The abdomen was entered with veress.  Trocars were placed.  The fundus and infundibulum were grasped and elevated. The infundibulum was rather friable.  There was thickened strands of adhesions around the cystic duct and artery.  The gallbladder was taken down from the liver with bovie.  Three endoloops were placed around the proximal cystic duct.  The gallbladder was then transected just proximal to the loops.  The gallbladder was placed into an endocatch bag and was removed.  The fascia of the epigastric site was closed with 0 vicryl using an endostitch passer. The skin was closed with 3-0 and 4-0 vicryl  Dry dressings were applied. The sponge, needle,and instrument counts were correct.  Complications: none  Disposition good to pacu  Findings:  Friable gallbladder

## 2022-04-13 NOTE — ANESTHESIA PROCEDURE NOTES
Airway  Urgency: elective    Date/Time: 4/13/2022 10:37 AM  Airway not difficult    General Information and Staff    Patient location during procedure: OR  CRNA: Arik Perez CRNA    Indications and Patient Condition  Indications for airway management: airway protection    Preoxygenated: yes  MILS maintained throughout  Mask difficulty assessment: 1 - vent by mask    Final Airway Details  Final airway type: endotracheal airway      Successful airway: ETT  Cuffed: yes   Successful intubation technique: direct laryngoscopy  Facilitating devices/methods: intubating stylet  Endotracheal tube insertion site: oral  Blade: Sivan  Blade size: 4  ETT size (mm): 8.0  Cormack-Lehane Classification: grade I - full view of glottis  Placement verified by: chest auscultation and capnometry   Cuff volume (mL): 8  Measured from: lips  ETT/EBT  to lips (cm): 21  Number of attempts at approach: 1  Assessment: lips, teeth, and gum same as pre-op and atraumatic intubation

## 2022-04-13 NOTE — ANESTHESIA POSTPROCEDURE EVALUATION
Patient: Jayce Gonzalez III    Procedure Summary     Date: 04/13/22 Room / Location:  [facility] OR 06 /  [facility] OR    Anesthesia Start: 1034 Anesthesia Stop: 1117    Procedure: LAPAROSCOPIC CHOLECYSTECTOMY (N/A Abdomen) Diagnosis: (CHOLELITHIASIS)    Surgeons: Erin Sterling MD Provider: Arik Perez CRNA    Anesthesia Type: general ASA Status: 2          Anesthesia Type: general    Vitals  Vitals Value Taken Time   /77 04/13/22 1255   Temp 97 °F (36.1 °C) 04/13/22 1255   Pulse 49 04/13/22 1256   Resp 14 04/13/22 1255   SpO2 100 % 04/13/22 1256   Vitals shown include unvalidated device data.        Post Anesthesia Care and Evaluation    Patient location during evaluation: PACU  Patient participation: complete - patient participated  Level of consciousness: awake and alert  Pain management: adequate  Airway patency: patent  Anesthetic complications: No anesthetic complications  PONV Status: none  Cardiovascular status: acceptable and hemodynamically stable  Respiratory status: acceptable  Hydration status: acceptable    Comments: Blood pressure 134/78, pulse (!) 49, temperature 97 °F (36.1 °C), temperature source Temporal, resp. rate 16, SpO2 100 %.    Patient discharged from PACU based upon En score. Please see RN notes for further details

## 2022-04-13 NOTE — ANESTHESIA PREPROCEDURE EVALUATION
Anesthesia Evaluation     no history of anesthetic complications:  NPO Solid Status: > 8 hours  NPO Liquid Status: > 8 hours           Airway   Mallampati: I  TM distance: >3 FB  Neck ROM: full  No difficulty expected  Dental      Pulmonary    (+) asthma (resolved),sleep apnea on CPAP,   Cardiovascular   Exercise tolerance: good (4-7 METS)    (-) hypertension      Neuro/Psych  (-) seizures, TIA, CVA  GI/Hepatic/Renal/Endo    (+)  GERD,    (-) liver disease, no renal disease, diabetes    Musculoskeletal     Abdominal    Substance History      OB/GYN          Other   arthritis,                      Anesthesia Plan    ASA 2     general     intravenous induction     Anesthetic plan, all risks, benefits, and alternatives have been provided, discussed and informed consent has been obtained with: patient.        CODE STATUS:

## 2022-04-14 LAB
LAB AP CASE REPORT: NORMAL
PATH REPORT.FINAL DX SPEC: NORMAL
PATH REPORT.GROSS SPEC: NORMAL

## 2022-06-23 RX ORDER — OMEPRAZOLE 20 MG/1
CAPSULE, DELAYED RELEASE ORAL DAILY
Status: ON HOLD | COMMUNITY
Start: 2022-03-28 | End: 2022-08-15 | Stop reason: SDUPTHER

## 2022-06-23 RX ORDER — TIZANIDINE 4 MG/1
TABLET ORAL PRN
COMMUNITY
Start: 2022-05-18

## 2022-06-23 RX ORDER — NAPROXEN 500 MG/1
500 TABLET ORAL 2 TIMES DAILY PRN
COMMUNITY

## 2022-06-23 RX ORDER — OXYCODONE HYDROCHLORIDE AND ACETAMINOPHEN 5; 325 MG/1; MG/1
1 TABLET ORAL PRN
COMMUNITY
Start: 2022-04-13 | End: 2022-08-29 | Stop reason: ALTCHOICE

## 2022-06-23 RX ORDER — LORATADINE 10 MG/1
10 TABLET ORAL DAILY
COMMUNITY

## 2022-06-23 RX ORDER — ACETAMINOPHEN 650 MG/1
650 SUPPOSITORY RECTAL PRN
COMMUNITY

## 2022-07-18 ENCOUNTER — OFFICE VISIT (OUTPATIENT)
Dept: GASTROENTEROLOGY | Age: 37
End: 2022-07-18
Payer: MEDICAID

## 2022-07-18 VITALS
HEIGHT: 71 IN | WEIGHT: 199 LBS | BODY MASS INDEX: 27.86 KG/M2 | OXYGEN SATURATION: 99 % | SYSTOLIC BLOOD PRESSURE: 130 MMHG | HEART RATE: 58 BPM | DIASTOLIC BLOOD PRESSURE: 70 MMHG

## 2022-07-18 DIAGNOSIS — R07.89 NON-CARDIAC CHEST PAIN: ICD-10-CM

## 2022-07-18 DIAGNOSIS — R10.10 UPPER ABDOMINAL PAIN: Primary | ICD-10-CM

## 2022-07-18 DIAGNOSIS — K21.9 GASTROESOPHAGEAL REFLUX DISEASE, UNSPECIFIED WHETHER ESOPHAGITIS PRESENT: ICD-10-CM

## 2022-07-18 PROCEDURE — 99204 OFFICE O/P NEW MOD 45 MIN: CPT | Performed by: NURSE PRACTITIONER

## 2022-07-18 PROCEDURE — G8419 CALC BMI OUT NRM PARAM NOF/U: HCPCS | Performed by: NURSE PRACTITIONER

## 2022-07-18 PROCEDURE — 4004F PT TOBACCO SCREEN RCVD TLK: CPT | Performed by: NURSE PRACTITIONER

## 2022-07-18 PROCEDURE — G8427 DOCREV CUR MEDS BY ELIG CLIN: HCPCS | Performed by: NURSE PRACTITIONER

## 2022-07-18 ASSESSMENT — ENCOUNTER SYMPTOMS
CONSTIPATION: 0
ANAL BLEEDING: 0
ABDOMINAL PAIN: 1
SHORTNESS OF BREATH: 0
CHOKING: 0
VOMITING: 0
ABDOMINAL DISTENTION: 0
COUGH: 0
BLOOD IN STOOL: 0
DIARRHEA: 0
NAUSEA: 0
TROUBLE SWALLOWING: 0
RECTAL PAIN: 0
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
## 2022-08-11 ENCOUNTER — ANESTHESIA EVENT (OUTPATIENT)
Dept: OPERATING ROOM | Age: 37
End: 2022-08-11

## 2022-08-15 ENCOUNTER — HOSPITAL ENCOUNTER (OUTPATIENT)
Age: 37
Setting detail: SPECIMEN
Discharge: HOME | End: 2022-08-15
Payer: COMMERCIAL

## 2022-08-15 ENCOUNTER — ANESTHESIA (OUTPATIENT)
Dept: OPERATING ROOM | Age: 37
End: 2022-08-15

## 2022-08-15 ENCOUNTER — HOSPITAL ENCOUNTER (OUTPATIENT)
Age: 37
Setting detail: OUTPATIENT SURGERY
Discharge: HOME | End: 2022-08-15
Attending: INTERNAL MEDICINE | Admitting: INTERNAL MEDICINE
Payer: COMMERCIAL

## 2022-08-15 ENCOUNTER — APPOINTMENT (OUTPATIENT)
Dept: OPERATING ROOM | Age: 37
End: 2022-08-15

## 2022-08-15 VITALS
WEIGHT: 193 LBS | SYSTOLIC BLOOD PRESSURE: 110 MMHG | RESPIRATION RATE: 18 BRPM | OXYGEN SATURATION: 99 % | HEART RATE: 53 BPM | DIASTOLIC BLOOD PRESSURE: 67 MMHG | BODY MASS INDEX: 27.02 KG/M2 | HEIGHT: 71 IN | TEMPERATURE: 98.4 F

## 2022-08-15 PROCEDURE — 43239 EGD BIOPSY SINGLE/MULTIPLE: CPT

## 2022-08-15 PROCEDURE — 43239 EGD BIOPSY SINGLE/MULTIPLE: CPT | Performed by: INTERNAL MEDICINE

## 2022-08-15 PROCEDURE — 88342 IMHCHEM/IMCYTCHM 1ST ANTB: CPT

## 2022-08-15 PROCEDURE — 88305 TISSUE EXAM BY PATHOLOGIST: CPT

## 2022-08-15 RX ORDER — PROPOFOL 10 MG/ML
INJECTION, EMULSION INTRAVENOUS PRN
Status: DISCONTINUED | OUTPATIENT
Start: 2022-08-15 | End: 2022-08-15 | Stop reason: SDUPTHER

## 2022-08-15 RX ORDER — DIPHENHYDRAMINE HYDROCHLORIDE 50 MG/ML
12.5 INJECTION INTRAMUSCULAR; INTRAVENOUS
Status: CANCELLED | OUTPATIENT
Start: 2022-08-15 | End: 2022-08-15

## 2022-08-15 RX ORDER — SODIUM CHLORIDE 9 MG/ML
INJECTION, SOLUTION INTRAVENOUS PRN
Status: CANCELLED | OUTPATIENT
Start: 2022-08-15

## 2022-08-15 RX ORDER — LIDOCAINE HYDROCHLORIDE 10 MG/ML
INJECTION, SOLUTION EPIDURAL; INFILTRATION; INTRACAUDAL; PERINEURAL PRN
Status: DISCONTINUED | OUTPATIENT
Start: 2022-08-15 | End: 2022-08-15 | Stop reason: SDUPTHER

## 2022-08-15 RX ORDER — OMEPRAZOLE 20 MG/1
20 CAPSULE, DELAYED RELEASE ORAL
Qty: 30 CAPSULE | Refills: 3 | Status: SHIPPED | OUTPATIENT
Start: 2022-08-15 | End: 2022-10-31 | Stop reason: SDUPTHER

## 2022-08-15 RX ORDER — SODIUM CHLORIDE 0.9 % (FLUSH) 0.9 %
5-40 SYRINGE (ML) INJECTION PRN
Status: CANCELLED | OUTPATIENT
Start: 2022-08-15

## 2022-08-15 RX ORDER — SODIUM CHLORIDE 9 MG/ML
INJECTION, SOLUTION INTRAVENOUS CONTINUOUS
Status: DISCONTINUED | OUTPATIENT
Start: 2022-08-15 | End: 2022-08-15 | Stop reason: HOSPADM

## 2022-08-15 RX ORDER — ONDANSETRON 2 MG/ML
4 INJECTION INTRAMUSCULAR; INTRAVENOUS
Status: CANCELLED | OUTPATIENT
Start: 2022-08-15 | End: 2022-08-15

## 2022-08-15 RX ORDER — SODIUM CHLORIDE 0.9 % (FLUSH) 0.9 %
5-40 SYRINGE (ML) INJECTION EVERY 12 HOURS SCHEDULED
Status: CANCELLED | OUTPATIENT
Start: 2022-08-15

## 2022-08-15 RX ADMIN — PROPOFOL 150 MG: 10 INJECTION, EMULSION INTRAVENOUS at 11:49

## 2022-08-15 RX ADMIN — LIDOCAINE HYDROCHLORIDE 30 MG: 10 INJECTION, SOLUTION EPIDURAL; INFILTRATION; INTRACAUDAL; PERINEURAL at 11:49

## 2022-08-15 RX ADMIN — SODIUM CHLORIDE: 9 INJECTION, SOLUTION INTRAVENOUS at 10:50

## 2022-08-15 RX ADMIN — LIDOCAINE HYDROCHLORIDE 30 MG: 10 INJECTION, SOLUTION EPIDURAL; INFILTRATION; INTRACAUDAL; PERINEURAL at 11:55

## 2022-08-15 NOTE — DISCHARGE INSTRUCTIONS
POST-OP ORDERS: ENDOSCOPY & COLONOSCOPY:    1. Rest today. 2. DO NOT eat or drink until wide awake; eat your usual diet today in moderate amount only. 3. DO NOT drive today. 4. Call physician if you have severe pain, vomiting, fever, rectal bleeding or black bowel movements. 5.  If a biopsy was taken or a polyp removed, you should expect to hear results in about 21 days. If you have heard nothing from your physician by then, call the office for results. 6.  Discharge home when patient awake, vitals signs stable and tolerating liquids. RECOMMENDATIONS:    1. Await path results  2. Minimize NSAID use  3. Continue PPI as prescribed    NSAIDS Non-steroidal Anti-Inflammatory  You have been directed by your physician to avoid any NSAID's; the following medications are a list of those to avoid. If you think that you are taking any NSAID's notify your physician.      Over The Counter  Advil                      Motrin  Nuprin                   Ibuprofen  Midol                     Aleve  Naproxen              Orudis  Aspirin                   Stacie-[place]    Prescriptions and Generics    Cataflam              Relafen  Voltaren               Clinoril  Indocin                 Naproxen  Arthrotec              Lodine  Daypro                 Nalfon  Toradol                Ansaid  Feldene               Meclofenamate  Fenoprofen          Ponstel  Mobic                   Celebrex  Vioxx
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
## 2022-08-15 NOTE — OP NOTE
Endoscopic Procedure Note    Patient: Rupa Gonzalez : 1985  Med Rec#: [id number] Acc#: [id number]     Primary Care Provider RAMIRO Calixto - CNP  Referring Provider: Aileen Baumgarten, APRN    Endoscopist: Katharine Temple MD    Date of Procedure:  8/15/2022    Procedure:   1. EGD with biopsy    Indications:   1. Abdominal pain  2. Non-cardiac chest pain  3. Reflux     Anesthesia:  Sedation was administered by anesthesia who monitored the patient during the procedure. Estimated Blood Loss: minimal    Procedure:   After reviewing the patient's chart and obtaining informed consent, the patient was placed in the left lateral decubitus position. A forward-viewing Olympus endoscope was lubricated and inserted through the mouth into the oropharynx. Under direct visualization, the upper esophagus was intubated. The scope was advanced to the level of the third portion of duodenum. Scope was slowly withdrawn with careful inspection of the mucosal surfaces. The scope was retroflexed for inspection of the gastric fundus and incisura. Findings and maneuvers are listed in impression below. The patient tolerated the procedure well. The scope was removed. There were no immediate complications. Findings:   Esophagus: abnormal: there are some questionable linear mucosal changes in the esophagus, random biopsies obtained to rule out EoE. There is no hiatal hernia present. Stomach:  abnormal: Mild mucosal changes suggestive of gastritis noted -  Gastric biopsies were taken from the antrum and body to rule out Helicobacter pylori infection. Duodenum: normal      IMPRESSION:  1. No obvious cause for chest pain, s/p esophageal biopsies to rule out EoE     RECOMMENDATIONS:    1. Await path results  2. Minimize NSAID use  3. Continue PPI as prescribed    The results were discussed with the patient and family. A copy of the images obtained were given to the patient.      sandi Mitchell for and in the presence of Dr. Obdulio Saba MD.  Electronically signed by Danie Fox RN on 8/15/2022 at 10:34 AM    I personally performed the services described in this documentation as scribed by Brigitte Wyatt, and it appears accurate and complete.      Riley Nuñez MD  8/15/2022

## 2022-08-15 NOTE — ANESTHESIA PRE PROCEDURE
Department of Anesthesiology  Preprocedure Note       Name:  Edgardo Rossi   Age:  39 y.o.  :  1985                                          MRN:  [id number]         Date:  8/15/2022      Surgeon: Glenn Arzola):  Riley Nuñez MD    Procedure: Procedure(s):  EGD BIOPSY    Medications prior to admission:   Prior to Admission medications    Medication Sig Start Date End Date Taking? Authorizing Provider   Polyethylene Glycol 3350 (MIRALAX PO) Take by mouth daily    Historical Provider, MD   loratadine (CLARITIN) 10 MG tablet Take 10 mg by mouth daily    Historical Provider, MD   omeprazole (PRILOSEC) 20 MG delayed release capsule daily 3/28/22   Historical Provider, MD   oxyCODONE-acetaminophen (PERCOCET) 5-325 MG per tablet Take 1 tablet by mouth as needed for Pain. Patient not taking: Reported on 8/15/2022 4/13/22   Historical Provider, MD   tiZANidine (ZANAFLEX) 4 MG tablet as needed 22   Historical Provider, MD   naproxen (NAPROSYN) 500 MG tablet Take 500 mg by mouth 2 times daily as needed for Pain    Historical Provider, MD   acetaminophen (TYLENOL) 650 MG suppository Place 650 mg rectally as needed for Fever    Historical Provider, MD       Current medications:    Current Facility-Administered Medications   Medication Dose Route Frequency Provider Last Rate Last Admin    0.9 % sodium chloride infusion   IntraVENous Continuous Oral MD Joaquin 100 mL/hr at 08/15/22 1050 New Bag at 08/15/22 1050       Allergies:     Allergies   Allergen Reactions    Mobic [Meloxicam] Shortness Of Breath       Problem List:    Patient Active Problem List   Diagnosis Code    Muscle weakness M62.81    Vertigo R42    Numbness and tingling in right hand R20.0, R20.2       Past Medical History:        Diagnosis Date    Arthritis     Asthma     Bone spur     Neck     GERD (gastroesophageal reflux disease)     Obstructive sleep apnea     CPAP        Past Surgical History:        Procedure Laterality Date    ARM SURGERY Broke x 2     CERVICAL FUSION  05/2021    C3-4    CHOLECYSTECTOMY  04/13/2022    EYE SURGERY      Spider bite on eye at age 4     KNEE SURGERY      cleaned out right knee     MOUTH SURGERY      TONSILLECTOMY AND ADENOIDECTOMY         Social History:    Social History     Tobacco Use    Smoking status: Former    Smokeless tobacco: Never   Substance Use Topics    Alcohol use: No                                Counseling given: Not Answered      Vital Signs (Current):   Vitals:    08/15/22 1041   BP: 112/75   Pulse: 58   Resp: 16   Temp: 98.4 °F (36.9 °C)   SpO2: 98%   Weight: 193 lb (87.5 kg)   Height: 5' 11\" (1.803 m)                                              BP Readings from Last 3 Encounters:   08/15/22 112/75   07/18/22 130/70   07/18/18 (!) 151/81       NPO Status: Time of last liquid consumption: 2300                        Time of last solid consumption: 2300                        Date of last liquid consumption: 08/14/22                        Date of last solid food consumption: 08/14/22    BMI:   Wt Readings from Last 3 Encounters:   08/15/22 193 lb (87.5 kg)   07/18/22 199 lb (90.3 kg)   07/18/18 200 lb (90.7 kg)     Body mass index is 26.92 kg/m².     CBC:   Lab Results   Component Value Date/Time    WBC 7.1 06/24/2016 05:55 AM    RBC 5.33 06/24/2016 05:55 AM    HGB 15.8 06/24/2016 05:55 AM    HCT 46.6 06/24/2016 05:55 AM    MCV 87.4 06/24/2016 05:55 AM    RDW 13.2 06/24/2016 05:55 AM     06/24/2016 05:55 AM       CMP:   Lab Results   Component Value Date/Time     06/24/2016 09:55 AM    K 4.2 06/24/2016 09:55 AM     06/24/2016 09:55 AM    CO2 22 06/24/2016 09:55 AM    BUN 16 06/24/2016 09:55 AM    CREATININE 0.9 06/24/2016 09:55 AM    LABGLOM >60 06/24/2016 09:55 AM    GLUCOSE 112 06/24/2016 09:55 AM    PROT 7.6 06/24/2016 09:55 AM    CALCIUM 10.9 06/24/2016 09:55 AM    BILITOT 0.4 06/24/2016 09:55 AM    ALKPHOS 43 06/24/2016 09:55 AM    AST 21 06/24/2016 09:55 AM    ALT 25 06/24/2016 09:55 AM       POC Tests: No results for input(s): POCGLU, POCNA, POCK, POCCL, POCBUN, POCHEMO, POCHCT in the last 72 hours. Coags: No results found for: PROTIME, INR, APTT    HCG (If Applicable): No results found for: PREGTESTUR, PREGSERUM, HCG, HCGQUANT     ABGs: No results found for: PHART, PO2ART, VEW6THG, YTA9JBF, BEART, O8RXZULX     Type & Screen (If Applicable):  No results found for: LABABO, LABRH    Drug/Infectious Status (If Applicable):  No results found for: HIV, HEPCAB    COVID-19 Screening (If Applicable): No results found for: COVID19        Anesthesia Evaluation  Patient summary reviewed and Nursing notes reviewed  Airway: Mallampati: I  TM distance: >3 FB   Neck ROM: full  Mouth opening: > = 3 FB   Dental: normal exam         Pulmonary:Negative Pulmonary ROS and normal exam  breath sounds clear to auscultation  (+) sleep apnea: on CPAP,  asthma:                           ROS comment: Quit smoking age 15   Cardiovascular:Negative CV ROS  Exercise tolerance: good (>4 METS),           Rhythm: regular  Rate: normal           Beta Blocker:  Not on Beta Blocker         Neuro/Psych:   Negative Neuro/Psych ROS              GI/Hepatic/Renal: Neg GI/Hepatic/Renal ROS  (+) GERD:,           Endo/Other: Negative Endo/Other ROS   (+) : arthritis: OA., .                 Abdominal:             Vascular: negative vascular ROS. Other Findings:           Anesthesia Plan      general and TIVA     ASA 2       Induction: intravenous. Anesthetic plan and risks discussed with patient.                         RAMIRO Monroe - JUNIOR   8/15/2022

## 2022-08-29 ENCOUNTER — OFFICE VISIT (OUTPATIENT)
Dept: GASTROENTEROLOGY | Age: 37
End: 2022-08-29
Payer: COMMERCIAL

## 2022-08-29 VITALS
OXYGEN SATURATION: 99 % | DIASTOLIC BLOOD PRESSURE: 64 MMHG | BODY MASS INDEX: 27.89 KG/M2 | HEART RATE: 59 BPM | SYSTOLIC BLOOD PRESSURE: 118 MMHG | HEIGHT: 71 IN | WEIGHT: 199.2 LBS

## 2022-08-29 DIAGNOSIS — R10.11 RUQ PAIN: Primary | ICD-10-CM

## 2022-08-29 DIAGNOSIS — Z87.19 HX OF GALLSTONES: ICD-10-CM

## 2022-08-29 DIAGNOSIS — R10.11 RUQ PAIN: ICD-10-CM

## 2022-08-29 LAB
ALBUMIN SERPL-MCNC: 4.6 G/DL (ref 3.5–5.2)
ALP BLD-CCNC: 49 U/L (ref 40–130)
ALT SERPL-CCNC: 22 U/L (ref 5–41)
ANION GAP SERPL CALCULATED.3IONS-SCNC: 11 MMOL/L (ref 7–19)
AST SERPL-CCNC: 24 U/L (ref 5–40)
BILIRUB SERPL-MCNC: 0.3 MG/DL (ref 0.2–1.2)
BUN BLDV-MCNC: 17 MG/DL (ref 6–20)
CALCIUM SERPL-MCNC: 9.8 MG/DL (ref 8.6–10)
CHLORIDE BLD-SCNC: 106 MMOL/L (ref 98–111)
CO2: 24 MMOL/L (ref 22–29)
CREAT SERPL-MCNC: 0.9 MG/DL (ref 0.5–1.2)
GFR AFRICAN AMERICAN: >59
GFR NON-AFRICAN AMERICAN: >60
GLUCOSE BLD-MCNC: 112 MG/DL (ref 74–109)
LIPASE: 27 U/L (ref 13–60)
POTASSIUM SERPL-SCNC: 4.3 MMOL/L (ref 3.5–5)
SODIUM BLD-SCNC: 141 MMOL/L (ref 136–145)
TOTAL PROTEIN: 6.8 G/DL (ref 6.6–8.7)

## 2022-08-29 PROCEDURE — G8419 CALC BMI OUT NRM PARAM NOF/U: HCPCS | Performed by: NURSE PRACTITIONER

## 2022-08-29 PROCEDURE — 1036F TOBACCO NON-USER: CPT | Performed by: NURSE PRACTITIONER

## 2022-08-29 PROCEDURE — 99214 OFFICE O/P EST MOD 30 MIN: CPT | Performed by: NURSE PRACTITIONER

## 2022-08-29 PROCEDURE — G8427 DOCREV CUR MEDS BY ELIG CLIN: HCPCS | Performed by: NURSE PRACTITIONER

## 2022-08-29 RX ORDER — HYOSCYAMINE SULFATE 0.125 MG
125 TABLET ORAL EVERY 4 HOURS PRN
Qty: 90 TABLET | Refills: 2 | Status: SHIPPED | OUTPATIENT
Start: 2022-08-29

## 2022-08-29 ASSESSMENT — ENCOUNTER SYMPTOMS
BLOOD IN STOOL: 0
SHORTNESS OF BREATH: 0
CONSTIPATION: 0
CHOKING: 0
COUGH: 0
DIARRHEA: 0
ABDOMINAL DISTENTION: 0
ANAL BLEEDING: 0
TROUBLE SWALLOWING: 0
RECTAL PAIN: 0
VOMITING: 0
NAUSEA: 0

## 2022-08-29 NOTE — PROGRESS NOTES
Subjective:     Patient ID: Annemarie Phan is a 39 y.o. male  PCP: RAMIRO Ovalle - CNP  Referring Provider: No ref. provider found    HPI  Patient presents to the office today with the following complaints: Follow-up      Pt here today for follow up after EGD on 8/15/22 for c/o chest pains. Today, pt continues to c/o stabbing pain in ruq. Chest pain \"not really as much\"  Pain occurs daily, at random, and will last for about one hour. He is currently taking Omeprazole 20 BID. Hx cholecystectomy in April for gallstones. All scope and pathology reports were reviewed and discussed with patient. All questions answered, pt verbalized understanding. EGD Findings 8/15/22:   Esophagus: abnormal: there are some questionable linear mucosal changes in the esophagus, random biopsies obtained to rule out EoE. There is no hiatal hernia present. Stomach:  abnormal: Mild mucosal changes suggestive of gastritis noted -  Gastric biopsies were taken from the antrum and body to rule out Helicobacter pylori infection. Duodenum: normal  IMPRESSION:  1. No obvious cause for chest pain, s/p esophageal biopsies to rule out EoE  RECOMMENDATIONS:    1. Await path results  2. Minimize NSAID use  3. Continue PPI as prescribed    FINAL DIAGNOSIS:   A. Stomach, gastric biopsy:   Fundic type gastric mucosa with minimal chronic inflammation. Negative for active inflammation. Negative for Helicobacter pylori organisms by immunohistochemical stain. Negative for intestinal metaplasia. Negative for dysplasia. B.  Esophagus, random, biopsy:   Stratified squamous esophageal mucosa with reflux changes. Squamocolumnar gastroesophageal junction is not visualized. Criteria for eosinophilic esophagitis are not fulfilled in these   biopsies. Negative dysplasia or invasive carcinoma. Assessment:     1. RUQ pain    2.  Hx of gallstones            Plan:   - Check CMP and Lipase today  -  liver   - Trial of Hyoscyamine prn for abdominal pain   - Call with any questions or concerns       Orders  Orders Placed This Encounter   Procedures    US LIVER     This procedure can be scheduled via [employer]. Access your [employer] account by visiting [email or web address].      Standing Status:   Future     Standing Expiration Date:   2023     Order Specific Question:   Reason for exam:     Answer:   evaluatel for biliary dilation    Lipase     Standing Status:   Future     Number of Occurrences:   1     Standing Expiration Date:   2023    Comprehensive Metabolic Panel     Standing Status:   Future     Number of Occurrences:   1     Standing Expiration Date:   2023     Medications  Orders Placed This Encounter   Medications    hyoscyamine (ANASPAZ;LEVSIN) 125 MCG tablet     Sig: Take 1 tablet by mouth every 4 hours as needed for Cramping     Dispense:  90 tablet     Refill:  2         Patient History:     Past Medical History:   Diagnosis Date    Arthritis     Asthma     Bone spur     Neck     GERD (gastroesophageal reflux disease)     Obstructive sleep apnea     CPAP        Past Surgical History:   Procedure Laterality Date    ARM SURGERY      Broke x 2     CERVICAL FUSION  2021    C3-4    CHOLECYSTECTOMY  2022    EYE SURGERY      Spider bite on eye at age 3     KNEE SURGERY      cleaned out right knee     MOUTH SURGERY      TONSILLECTOMY AND ADENOIDECTOMY      UPPER GASTROINTESTINAL ENDOSCOPY N/A 08/15/2022    Dr Jacob Conti-Questionable linear mucosal changes in the esophagus-No EOE or h pylori       Family History   Problem Relation Age of Onset    Colon Cancer Neg Hx     Colon Polyps Neg Hx     Esophageal Cancer Neg Hx     Liver Cancer Neg Hx     Rectal Cancer Neg Hx     Stomach Cancer Neg Hx        Social History     Socioeconomic History    Marital status:    Tobacco Use    Smoking status: Former     Years: 1.00     Types: Cigarettes     Quit date:      Years since quittin.6    Smokeless tobacco: Never   Vaping Use    Vaping Use: Never used   Substance and Sexual Activity    Alcohol use: No    Drug use: No       Current Outpatient Medications   Medication Sig Dispense Refill    hyoscyamine (ANASPAZ;LEVSIN) 125 MCG tablet Take 1 tablet by mouth every 4 hours as needed for Cramping 90 tablet 2    omeprazole (PRILOSEC) 20 MG delayed release capsule Take 1 capsule by mouth in the morning and 1 capsule in the evening. Take before meals. (Patient taking differently: Take 20 mg by mouth in the morning and at bedtime) 30 capsule 3    Polyethylene Glycol 3350 (MIRALAX PO) Take by mouth daily      loratadine (CLARITIN) 10 MG tablet Take 10 mg by mouth daily      tiZANidine (ZANAFLEX) 4 MG tablet as needed      naproxen (NAPROSYN) 500 MG tablet Take 500 mg by mouth 2 times daily as needed for Pain      acetaminophen (TYLENOL) 650 MG suppository Place 650 mg rectally as needed for Fever       No current facility-administered medications for this visit. Allergies   Allergen Reactions    Corticosteroids Shortness Of Breath     chestpains    Mobic [Meloxicam] Shortness Of Breath    Nsaids Other (See Comments) and Shortness Of Breath     Chest pain  Chest pain         Review of Systems   Constitutional:  Negative for activity change, appetite change, fatigue, fever and unexpected weight change. HENT:  Negative for trouble swallowing. Respiratory:  Negative for cough, choking and shortness of breath. Cardiovascular:  Negative for chest pain. Gastrointestinal:  Positive for abdominal pain. Negative for abdominal distention, anal bleeding, blood in stool, constipation, diarrhea, nausea, rectal pain and vomiting. Allergic/Immunologic: Negative for food allergies. All other systems reviewed and are negative. Objective:     /64   Pulse 59   Ht 5' 11\" (1.803 m)   Wt 199 lb 3.2 oz (90.4 kg)   SpO2 99%   BMI 27.78 kg/m²     Physical Exam  Vitals reviewed.    Constitutional:       General: He is not in acute distress. Appearance: He is well-developed. HENT:      Head: Normocephalic and atraumatic. Right Ear: External ear normal.      Left Ear: External ear normal.      Nose: Nose normal.      Comments: Mask on     Mouth/Throat:      Comments: Mask on  Eyes:      General: No scleral icterus. Right eye: No discharge. Left eye: No discharge. Conjunctiva/sclera: Conjunctivae normal.      Pupils: Pupils are equal, round, and reactive to light. Cardiovascular:      Rate and Rhythm: Normal rate and regular rhythm. Heart sounds: Normal heart sounds. No murmur heard. Pulmonary:      Effort: Pulmonary effort is normal. No respiratory distress. Breath sounds: Normal breath sounds. No wheezing or rales. Abdominal:      General: Bowel sounds are normal. There is no distension. Palpations: Abdomen is soft. There is no mass. Tenderness: There is abdominal tenderness in the right upper quadrant and epigastric area. There is no guarding or rebound. Musculoskeletal:         General: Normal range of motion. Cervical back: Normal range of motion and neck supple. Skin:     General: Skin is warm and dry. Coloration: Skin is not pale. Neurological:      Mental Status: He is alert and oriented to person, place, and time.    Psychiatric:         Behavior: Behavior normal.

## 2022-08-31 ASSESSMENT — ENCOUNTER SYMPTOMS: ABDOMINAL PAIN: 1

## 2022-09-12 ENCOUNTER — OFFICE VISIT (OUTPATIENT)
Dept: SLEEP MEDICINE | Facility: HOSPITAL | Age: 37
End: 2022-09-12

## 2022-09-12 VITALS
HEART RATE: 53 BPM | BODY MASS INDEX: 27.02 KG/M2 | SYSTOLIC BLOOD PRESSURE: 118 MMHG | OXYGEN SATURATION: 97 % | HEIGHT: 71 IN | DIASTOLIC BLOOD PRESSURE: 78 MMHG | WEIGHT: 193 LBS

## 2022-09-12 DIAGNOSIS — G47.33 OBSTRUCTIVE SLEEP APNEA: Primary | ICD-10-CM

## 2022-09-12 PROCEDURE — 99213 OFFICE O/P EST LOW 20 MIN: CPT | Performed by: NURSE PRACTITIONER

## 2022-09-12 RX ORDER — TIZANIDINE 4 MG/1
TABLET ORAL AS NEEDED
COMMUNITY
Start: 2022-05-18

## 2022-09-12 RX ORDER — HYOSCYAMINE SULFATE 0.125 MG
TABLET ORAL
COMMUNITY
Start: 2022-08-29

## 2022-09-12 NOTE — PROGRESS NOTES
Sleep Clinic Follow Up    Date: 2022  Primary Care Provider: Nasra Eduardo MD    Last office visit: 2021 (I reviewed this note)    CC: Follow up: PRASANTH on CPAP      Interim History:  Since the last visit:    1) mild PRASANTH -  Jayce Gonzalez III has remained compliant with CPAP. He denies mask and machine issues, dry mouth, headaches, or pressures intolerance. He denies abnormal dreams, sleep paralysis, nasal congestion, URI sx. He has not received his replacement machine.    2) Patient denies RLS symptoms.     Sleep Testin. HST on 2019, AHI of 9.9   2. Currently on 5-20 cm H2O    PAP Data:    Time frame: 2021-2022   Compliance: 98.9%  Average use on days used: 8 hrs 1 min  Percent of days with usage greater than or equal to 4 hours: 98.9%  PAP range: 5-20 cm H2O  Average 90% pressure: 7.1 cmH2O  Leak: 0 minutes  Average AHI: 3.4 events/hr  Mask type: Nasal cushion  DME: [facility]  Machine type: [employer] DreamStation    Bed time:   Sleep latency: 20-30 minutes  Number of times awakens during the night: 6-8 (for the past couple of weeks)  Wake time: 7989-0047  Estimated total sleep time at night: 7-8 hours  Caffeine intake: 0 cups of coffee, 0 cups of tea, and 1 sodas per day  Alcohol intake: 0 drinks per week  Nap time: none   Sleepiness with Driving: none     Camilla - 8  Camilla Sleepiness Scale  Sitting and reading: Slight chance of dozing  Watching TV: High chance of dozing  Sitting, inactive in a public place (e.g. a theatre or a meeting): Slight chance of dozing  As a passenger in a car for an hour without a break: Would never doze  Lying down to rest in the afternoon when circumstances permit: Moderate chance of dozing  Sitting and talking to someone: Would never doze  Sitting quietly after a lunch without alcohol: Slight chance of dozing  In a car, while stopped for a few minutes in traffic: Would never doze  Total score: 8    PMHx, FH, SH reviewed and pertinent  changes are: Saw Cardiology - had stress test, echo, holter monitor - told WNL. Had gallbladder removed.    Review of Systems:   Negative for chest pain, SOA, fever, chills, cough, N/V/D, abdominal pain.    Smoking:none    Jayce A Wagner III  reports that he quit smoking about 21 years ago. His smoking use included cigarettes. He started smoking about 23 years ago. He has never used smokeless tobacco.      Physical Exam:  Vitals:    09/12/22 0954   BP: 118/78   Pulse: 53   SpO2: 97%           09/12/22  0954   Weight: 87.5 kg (193 lb)     Body mass index is 27.02 kg/m². BMI is elevated. Recommend follow up with PCP.    Gen:                No distress, conversant, pleasant, appears stated age, alert, oriented  Eyes:               Anicteric sclera, moist conjunctiva, no lid lag                           PERRL, EOMI   Heent:             NC/AT                          Oropharynx clear                          Normal hearing  Lungs:             Normal effort, non-labored breathing         CV:                  Normal S1/S2                          No lower extremity edema  ABD:               Rounded, non-distended                Psych:             Appropriate affect  Neuro:             CN 2-12 appear intact    Past Medical History:   Diagnosis Date   • Arthritis    • Asthma    • GERD (gastroesophageal reflux disease)    • Hypertension     HAD AS A CHILD NEVER TOOK MED FOR   • Sleep apnea     CPAP       Current Outpatient Medications:   •  acetaminophen (TYLENOL) 500 MG tablet, Take 500 mg by mouth Every 6 (Six) Hours As Needed for Mild Pain ., Disp: , Rfl:   •  hyoscyamine (ANASPAZ,LEVSIN) 0.125 MG tablet, TAKE 1 TABLET BY MOUTH EVERY 4 HOURS AS NEEDED FOR CRAMPS, Disp: , Rfl:   •  loratadine (CLARITIN) 10 MG tablet, Take 10 mg by mouth Daily., Disp: , Rfl:   •  omeprazole (priLOSEC) 20 MG capsule, Take 20 mg by mouth Daily., Disp: , Rfl:   •  tiZANidine (ZANAFLEX) 4 MG tablet, As Needed., Disp: , Rfl:     WBC   Date Value  Ref Range Status   04/11/2022 7.86 3.40 - 10.80 10*3/mm3 Final     RBC   Date Value Ref Range Status   04/11/2022 4.88 4.14 - 5.80 10*6/mm3 Final     Hemoglobin   Date Value Ref Range Status   04/11/2022 14.2 13.0 - 17.7 g/dL Final     Hematocrit   Date Value Ref Range Status   04/11/2022 42.4 37.5 - 51.0 % Final     MCV   Date Value Ref Range Status   04/11/2022 86.9 79.0 - 97.0 fL Final     MCH   Date Value Ref Range Status   04/11/2022 29.1 26.6 - 33.0 pg Final     MCHC   Date Value Ref Range Status   04/11/2022 33.5 31.5 - 35.7 g/dL Final     RDW   Date Value Ref Range Status   04/11/2022 13.1 12.3 - 15.4 % Final     RDW-SD   Date Value Ref Range Status   04/11/2022 41.4 37.0 - 54.0 fl Final     MPV   Date Value Ref Range Status   04/11/2022 9.3 6.0 - 12.0 fL Final     Platelets   Date Value Ref Range Status   04/11/2022 265 140 - 450 10*3/mm3 Final     Neutrophil %   Date Value Ref Range Status   04/11/2022 53.3 42.7 - 76.0 % Final     Lymphocyte %   Date Value Ref Range Status   04/11/2022 37.8 19.6 - 45.3 % Final     Monocyte %   Date Value Ref Range Status   04/11/2022 6.7 5.0 - 12.0 % Final     Eosinophil %   Date Value Ref Range Status   04/11/2022 1.7 0.3 - 6.2 % Final     Basophil %   Date Value Ref Range Status   04/11/2022 0.5 0.0 - 1.5 % Final     Immature Grans %   Date Value Ref Range Status   04/11/2022 0.0 0.0 - 0.5 % Final     Neutrophils, Absolute   Date Value Ref Range Status   04/11/2022 4.19 1.70 - 7.00 10*3/mm3 Final     Lymphocytes, Absolute   Date Value Ref Range Status   04/11/2022 2.97 0.70 - 3.10 10*3/mm3 Final     Monocytes, Absolute   Date Value Ref Range Status   04/11/2022 0.53 0.10 - 0.90 10*3/mm3 Final     Eosinophils, Absolute   Date Value Ref Range Status   04/11/2022 0.13 0.00 - 0.40 10*3/mm3 Final     Basophils, Absolute   Date Value Ref Range Status   04/11/2022 0.04 0.00 - 0.20 10*3/mm3 Final     Immature Grans, Absolute   Date Value Ref Range Status   04/11/2022 0.00 0.00 -  0.05 10*3/mm3 Final     nRBC   Date Value Ref Range Status   04/11/2022 0.0 0.0 - 0.2 /100 WBC Final       Lab Results   Component Value Date    GLUCOSE 112 (H) 08/29/2022    BUN 17 08/29/2022    CREATININE 0.9 08/29/2022    EGFRIFNONA >60 08/29/2022    EGFRIFAFRI >59 08/29/2022    BCR 19.0 04/11/2022    K 4.3 08/29/2022    CO2 24 08/29/2022    CALCIUM 9.8 08/29/2022    ALBUMIN 4.6 08/29/2022    AST 24 08/29/2022    ALT 22 08/29/2022       Assessment and Plan:    1. Obstructive sleep apnea - Established, stable (1)  1. Compliant with PAP therapy  2. Continue PAP as prescribed  3. Script for PAP supplies  4. Patient possibly wishes to have a pressure adjustment but wants to try changing his filters first - he is supposed to get a replacement machine this month. I advised him to call me if he feels like he still needs more pressure and I will make a pressure adjustment to at least 7-20 cm H2O initially  5. Return to clinic in 1 year with compliance report unless change in symptoms in interim period      I spent 20 minutes caring for Jayce on this date of service. This time includes time spent by me in the following activities: preparing for the visit, reviewing tests, obtaining and/or reviewing a separately obtained history, performing a medically appropriate examination and/or evaluation , counseling and educating the patient/family/caregiver, documenting information in the medical record and care coordination; discussing PAP therapy, PAP compliance and PAP maintenance    RTC in 12 months. Patient agrees to return sooner if changes in symptoms.        This document has been electronically signed by WHITNEY Wahl on September 12, 2022 10:11 CDT          CC: Nasra Eduardo MD          No ref. provider found

## 2022-10-31 RX ORDER — OMEPRAZOLE 20 MG/1
20 CAPSULE, DELAYED RELEASE ORAL
Qty: 180 CAPSULE | Refills: 2 | Status: SHIPPED | OUTPATIENT
Start: 2022-10-31

## 2022-10-31 NOTE — TELEPHONE ENCOUNTER
Last visit [facility] aprn 8-29-22. No FU scheduled. Egd  8-15-22.  faxed a RF request for 90 day supply on Omeprazole 20 mg Bid, this will save the patient money on co-pay. DEBRA PUCKETT to [address] [facility] 8-15-22 # 30 x 3 refills.   cma

## 2023-08-15 ENCOUNTER — TRANSCRIBE ORDERS (OUTPATIENT)
Dept: ADMINISTRATIVE | Facility: HOSPITAL | Age: 38
End: 2023-08-15
Payer: COMMERCIAL

## 2023-08-15 DIAGNOSIS — M54.2 CERVICAL PAIN: Primary | ICD-10-CM

## 2023-08-18 ENCOUNTER — TRANSCRIBE ORDERS (OUTPATIENT)
Dept: ADMINISTRATIVE | Facility: HOSPITAL | Age: 38
End: 2023-08-18
Payer: COMMERCIAL

## 2023-08-18 DIAGNOSIS — M54.2 CERVICALGIA: Primary | ICD-10-CM

## 2023-08-23 ENCOUNTER — HOSPITAL ENCOUNTER (OUTPATIENT)
Dept: PHYSICAL THERAPY | Facility: HOSPITAL | Age: 38
Setting detail: THERAPIES SERIES
Discharge: HOME | End: 2023-08-23
Payer: COMMERCIAL

## 2023-08-23 ENCOUNTER — TRANSCRIBE ORDERS (OUTPATIENT)
Dept: PHYSICAL THERAPY | Facility: HOSPITAL | Age: 38
End: 2023-08-23
Payer: COMMERCIAL

## 2023-08-23 DIAGNOSIS — M54.2 CERVICALGIA: Primary | ICD-10-CM

## 2023-08-23 PROCEDURE — 97161 PT EVAL LOW COMPLEX 20 MIN: CPT | Performed by: PHYSICAL THERAPIST

## 2023-08-23 NOTE — THERAPY EVALUATION
Outpatient Physical Therapy Ortho Initial Evaluation  [facility]     Patient Name: Jayce Gonzalez III  : 1985  MRN: [id number]  Today's Date: 2023      Visit Date: 2023    Attendance: 1 visits  Subjective improvement: N/A  MD appt: MRI/CT 2023; MD end   Recheck due: 2023      Patient Active Problem List   Diagnosis    Neck pain    Obstructive sleep apnea, adult    Asthma    Cervicalgia    Cervical radiculopathy    Cubital tunnel syndrome on right    Non-smoker    Allergic rhinitis    Gastroesophageal reflux disease    Muscle weakness    Numbness and tingling in right hand    Skin sensation disturbance    Vertigo    Class 1 obesity due to excess calories without serious comorbidity with body mass index (BMI) of 31.0 to 31.9 in adult    Dyslipidemia        Past Medical History:   Diagnosis Date    Arthritis     Asthma     GERD (gastroesophageal reflux disease)     Hypertension     HAD AS A CHILD NEVER TOOK MED FOR    Sleep apnea     CPAP        Past Surgical History:   Procedure Laterality Date    CERVICAL FUSION      CHOLECYSTECTOMY WITH INTRAOPERATIVE CHOLANGIOGRAM N/A 2022    Procedure: LAPAROSCOPIC CHOLECYSTECTOMY;  Surgeon: Erin Sterling MD;  Location: [facility];  Service: General;  Laterality: N/A;    EYE SURGERY      FRACTURE SURGERY Right     Right arm     KNEE ARTHROSCOPY Right     TONSILLECTOMY AND ADENOIDECTOMY      ULNAR NERVE DECOMPRESSION Right 7/15/2020    Procedure: right CUBITAL TUNNEL RELEASE;  Surgeon: Jean-Paul Hardin MD;  Location: [facility];  Service: Neurosurgery;  Laterality: Right;       Visit Dx:     ICD-10-CM ICD-9-CM   1. Cervicalgia  M54.2 723.1          Patient History       Row Name 23 0800             History    Chief Complaint Difficulty with daily activities;Headache;Pain  -KG      Type of Pain Neck pain;Upper Extremity / Arm  -KG      Date Current Problem(s) Began --  2022  -KG      Brief Description of Current  Complaint Presents with c/o R sided neck and RUE pain. States he had a C3-4 fusion in 2021. States he was doing good for a year, having no pain or symptoms. Went in for cholecystectomy in April 2022. Right after his surgery he starting having pain & numbness/tingling down RUE. CT/MRI scheduled for 9/5. States had an x-ray but it didn't show any issues with the hardware. Goes back to [place] towards the end September. Numbness/tingling in last 2 fingers (digts 4 & 5). Pain & N/T symptoms travels from R side of neck down the back of arm & shoulder. states has some pain into his R shoulder blade as well. Gets HA's about 2x/week.  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  -KG      What clinical tests have you had for this problem? X-ray  -KG      Results of Clinical Tests No issues with hardware per pt report  -KG         Pain     Pain Location Neck;Shoulder;Arm  -KG      Pain at Present 3  -KG      Pain at Best 3  -KG      Pain at Worst 9  -KG      Pain Frequency Constant/continuous  -KG      Pain Description Aching;Numbness;Tingling;Radiating;Sharp  -KG      What Performance Factors Make the Current Problem(s) WORSE? Work activities, any use of BUE's, lifting, driving at times  -KG      What Performance Factors Make the Current Problem(s) BETTER? TENS, position changes, states stretches of neck help improve his tingling sometimes  -KG      Pain Comments Anti-inflammatories, muscle relaxer prn  -KG      Is your sleep disturbed? Yes  -KG      What position do you sleep in? Supine;Left sidelying  L sidelying if lower back hurting  -KG         Fall Risk Assessment    Any falls in the past year: No  -KG         Services    Are you currently receiving Home Health  services No  -KG      Do you plan to receive Home Health services in the near future No  -KG                User Key  (r) = Recorded By, (t) = Taken By, (c) = Cosigned By      Initials Name Provider Type    ISATU Nupur Orr PT Physical Therapist                     PT Ortho       Row Name 08/23/23 0800       Subjective Comments    Subjective Comments Refer to therapy pt history for details  -KG       Precautions and Contraindications    Precautions C3-4 fusion, low back pain  -KG       Subjective Pain    Able to rate subjective pain? yes  -KG    Pre-Treatment Pain Level 3  -KG    Post-Treatment Pain Level 3  -KG       Posture/Observations    Posture/Observations Comments No acute distress. Decreased overall postural awareness. Slightly rounded shoulders  -KG       Quarter Clearing    Quarter Clearing Upper Quarter Clearing  -KG       Neural Tension Signs- Upper Quarter Clearing    ULNTT 1 Right:;Postive  -KG    ULNTT 3 Negative  -KG    ULNTT 4 Right:;Postive  -KG       Sensory Screen for Light Touch- Upper Quarter Clearing    C4 (posterior shoulder) Bilateral:;Intact  -KG    C5 (lateral upper arm) Bilateral:;Intact  -KG    C6 (tip of thumb) Bilateral:;Intact  -KG    C7 (tip of 3rd finger) Bilateral:;Intact  -KG    C8 (tip of 5th finger) Right:;Diminished;Left:;Intact  -KG    T1 (medial lower arm) Bilateral:;Intact  -KG       Special Tests/Palpation    Special Tests/Palpation Cervical/Thoracic  -KG       Cervical Palpation    Cervical Palpation- Location? Levator scapula;Upper traps;Middle traps;Suboccipital  -KG    Suboccipital Guarded/taut  -KG    Levator Scapula Right:;Tender;Guarded/taut  -KG    Upper Traps Right:;Tender;Guarded/taut  -KG    Middle Traps Right:;Tender;Guarded/taut  -KG       Thoracic Accessory Motions    Thoracic Accessory Motions Tested? Yes  -KG    Pa glide- Upper thoracic Center:;Hypomobile  mild  -KG    Pa glide- Middle thoracic Center:;Hypomobile  mild  -KG       Shoulder  Impingement/Rotator Cuff Special Tests    Ennis-Matthew Test (RC Lesion vs. Bursitis) Bilateral:;Negative  -KG    Full Can Test (RC Lesion) Bilateral:;Negative  -KG    Empty Can Test (RC Lesion) Bilateral:;Negative  -KG    Lift-Off Test (Subscapularis Lesion) Bilateral:;Negative  -KG    Belly Press (Subscapularis Lesion) Bilateral:;Negative  -KG       General ROM    Head/Neck/Trunk Neck Extension;Neck Flexion;Neck Lt Lateral Flexion;Neck Rt Lateral Flexion;Neck Lt Rotation;Neck Rt Rotation  -KG       Head/Neck/Trunk    Neck Extension AROM 57 deg, R scapula pain  -KG    Neck Flexion AROM 55 deg  -KG    Neck Lt Lateral Flexion AROM 25 deg pain R  -KG    Neck Rt Lateral Flexion AROM 30 deg no pain  -KG    Neck Lt Rotation AROM 69 deg no pain  -KG    Neck Rt Rotation AROM 55 deg no pain  -KG       MMT (Manual Muscle Testing)    Rt Upper Ext Rt Shoulder Flexion;Rt Shoulder ABduction;Rt Elbow Flexion;Rt Elbow Extension;Rt Wrist Flexion;Rt Wrist Extension  -KG    Lt Upper Ext Lt Shoulder Flexion;Lt Shoulder ABduction;Lt Elbow Extension;Lt Elbow Flexion;Lt Wrist Flexion;Lt Wrist Extension  -KG       MMT Right Upper Ext    Rt Shoulder Flexion MMT, Gross Movement (4+/5) good plus  -KG    Rt Shoulder ABduction MMT, Gross Movement (4+/5) good plus  -KG    Rt Elbow Flexion MMT, Gross Movement: (5/5) normal  -KG    Rt Elbow Extension MMT, Gross Movement: (5/5) normal  -KG    Rt Wrist Flexion MMT, Gross Movement (5/5) normal  -KG    Rt Wrist Extension MMT, Gross Movement (5/5) normal  -KG       MMT Left Upper Ext    Lt Shoulder Flexion MMT, Gross Movement (5/5) normal  -KG    Lt Shoulder ABduction MMT, Gross Movement (5/5) normal  -KG    Lt Elbow Flexion MMT, Gross Movement (5/5) normal  -KG    Lt Elbow Extension MMT, Gross Movement (5/5) normal  -KG    Lt Wrist Flexion MMT, Gross Movement (5/5) normal  -KG    Lt Wrist Extension MMT, Gross Movement (5/5) normal  -KG        Strength Right    # Reps 3  -KG    Right Rung 2  -KG     Right  Test 1 75  -KG    Right  Test 2 60  -KG    Right  Test 3 55  -KG     Strength Average Right 63.33  -KG        Strength Left    # Reps 3  -KG    Left Rung 2  -KG    Left  Test 1 100  -KG    Left  Test 2 90  -KG    Left  Test 3 90  -KG     Strength Average Left 93.33  -KG       Sensation    Sensation WNL? WFL  -KG    Light Touch Partial deficits in the RUE  -KG    Additional Comments see above for details  -KG       Flexibility    Flexibility Tested? Upper Extremity  -KG       Upper Extremity Flexibility    Upper Trapezius Bilateral:;Mildly limited  -KG    Pect Minor Bilateral:;Mildly limited  -KG       Gait/Stairs (Locomotion)    Comment, (Gait/Stairs) Ambulates with no AD, non-antalgic gait.  -KG       Hand  Strength     Strength Affected Side Bilateral  -KG              User Key  (r) = Recorded By, (t) = Taken By, (c) = Cosigned By      Initials Name Provider Type    KG Nupur Orr, PT Physical Therapist                                Therapy Education  Education Details: POC education. Anatomy ed. HEP: see exercise flowsheet for details  Given: HEP, Symptoms/condition management, Pain management, Posture/body mechanics  Program: New  How Provided: Verbal, Demonstration, Written  Provided to: Patient  Level of Understanding: Teach back education performed, Verbalized, Demonstrated      PT OP Goals       Row Name 08/23/23 0800          PT Short Term Goals    STG Date to Achieve 09/13/23  -KG     STG 1 Demonstrate independence/compliance in performance of initial HEP  -KG     STG 2 Demonstrate improved seated posture/positioning with seated scap/cervical stabilization activities with minimal cues required for correction  -KG     STG 3 Subjectively report 25% improvement in RUE radiating symptoms throughout a full work day  -KG     STG 4 Demosntrate improved L cervical lateral flex to 30 deg or greater without increased pain on R  -KG        Long Term Goals     LTG Date to Achieve 10/04/23  -KG     LTG 1 Subjectively report 50% overall improvement in pain/symptoms and functional activity tolerance/performance  -KG     LTG 2 Improve NDI score to 30% or less demonstrating imrpoved tolerance to daily functional activities  -KG     LTG 3 Demonstrate improve R shoulder flex/abd MMT to 5/5  -KG     LTG 4 Demonstrate improved R cervical rotation AROM to 65 deg without increased ipsilateral pain/symptoms  -KG     LTG 5 Demonstrate proper lifting/body mechanics when lifting lightly weighted crate from various surface heights without increased pain  -KG     LTG 6 Subjectively report/demonstrate no radiating RUE pain/symptoms distal to the elbow during full treatment session  -KG     LTG 7 Demonstrate improved R  strength to 75# or greater to assist with performance of work activities  -KG        Time Calculation    PT Goal Re-Cert Due Date 09/13/23  -KG               User Key  (r) = Recorded By, (t) = Taken By, (c) = Cosigned By      Initials Name Provider Type    KG Nupur Orr, PT Physical Therapist                     PT Assessment/Plan       Row Name 08/23/23 0800          PT Assessment    Functional Limitations Limitation in home management;Limitations in community activities;Performance in leisure activities;Performance in work activities;Limitations in functional capacity and performance  -KG     Impairments Impaired flexibility;Impaired muscle endurance;Muscle strength;Pain;Poor body mechanics;Posture;Range of motion  -KG     Assessment Comments Pt is a 37 y.o. male presenting with neck and RUE pain/n&T that is limiting performance of functional mobility & daily activities. Hx of C3-4 fusion on 5/24/2021. Notes he was doing very well until last April, started having radiating pain & numbness after his cholecystectomy. Pt does have some ulnar nerve tension in RUE. Quite tuat/TTP at R levator scap area and into interscap muscles. Mild limitations in cervical  mobility noted. Does have some mild reproduction of tingling/pain symptoms with spurling's on R. Pt did well with initial therex activities for small HEP adminstration. Pt will benefit from skilled PT services to address these deficits for improvements in RUE pain/symptoms, cervical mobility, postural/cervical stability, posture/ awareness, & functional activity tolerance.  -KG     Rehab Potential Good  -KG     Patient/caregiver participated in establishment of treatment plan and goals Yes  -KG     Patient would benefit from skilled therapy intervention Yes  -KG        PT Plan    PT Frequency 2x/week  -KG     Predicted Duration of Therapy Intervention (PT) 12 visits  -KG     Planned CPT's? PT EVAL LOW COMPLEXITY: 50761;PT THER PROC EA 15 MIN: 34953;PT THER ACT EA 15 MIN: 03397;PT MANUAL THERAPY EA 15 MIN: 40409;PT NEUROMUSC RE-EDUCATION EA 15 MIN: 36503  -KG     Physical Therapy Interventions (Optional Details) home exercise program;joint mobilization;manual therapy techniques;modalities;neuromuscular re-education;patient/family education;postural re-education;ROM (Range of Motion);stretching;strengthening  -KG     PT Plan Comments Work on centralizing/improving RUE symtpoms. work on overall cerivcal/UE stretching, cervical/scap stabilization. Posture/ awareness. Continue UE/ulnar nerve glides. Manual/soft tissue work to UT, levator scap, & interscap area.  -KG               User Key  (r) = Recorded By, (t) = Taken By, (c) = Cosigned By      Initials Name Provider Type    KG Nupur Orr, PT Physical Therapist                       OP Exercises       Row Name 08/23/23 0800             Subjective Comments    Subjective Comments Refer to therapy pt history for details  -KG         Subjective Pain    Able to rate subjective pain? yes  -KG      Pre-Treatment Pain Level 3  -KG      Post-Treatment Pain Level 3  -KG         Exercise 1    Exercise Name 1 Seated john UT stretch with over  "pressure  -KG      Reps 1 Review/demo for HEP  -KG      Additional Comments with overpressure  -KG         Exercise 2    Exercise Name 2 Seated john levator scap stretch  -KG      Reps 2 Review/demo for HEP  -KG      Additional Comments no overpressure  -KG         Exercise 3    Exercise Name 3 Supine chin tucks  -KG      Cueing 3 Verbal  -KG      Sets 3 1  -KG      Reps 3 10  -KG      Time 3 ~3\" hold  -KG         Exercise 4    Exercise Name 4 Seated posture no moneys  -KG      Cueing 4 Verbal  -KG      Sets 4 1  -KG      Reps 4 10  -KG         Exercise 5    Exercise Name 5 Low doorway pec stretch  -KG      Cueing 5 Verbal  -KG      Reps 5 1  -KG      Time 5 30\" hold  -KG         Exercise 6    Exercise Name 6 Seated R ulnar nerve glide: shoulder at 90 deg, palm facing outward - flexing elbow and extending wrist  -KG      Cueing 6 Verbal  -KG      Sets 6 1  -KG      Reps 6 5  -KG      Time 6 ~5\" hold  -KG                User Key  (r) = Recorded By, (t) = Taken By, (c) = Cosigned By      Initials Name Provider Type    Nupur Raman, PT Physical Therapist                                  Outcome Measure Options: Neck Disability Index (NDI), Quick DASH  Quick DASH  Open a tight or new jar.: Moderate Difficulty  Do heavy household chores (e.g., wash walls, wash floors): No Difficulty  Carry a shopping bag or briefcase: Mild Difficulty  Wash your back: No Difficulty  Use a knife to cut food: No Difficulty  Recreational activities in which you take some force or impact through your arm, should or hand (e.g. golf, hammering, tennis, etc.): Moderate Difficulty  During the past week, to what extent has your arm, shoulder, or hand problem interfered with your normal social activites with family, friends, neighbors or groups?: Slightly  During the past week, were you limited in your work or other regular daily activities as a result of your arm, shoulder or hand problem?: Moderately Limited  Arm, Shoulder, or hand pain: " Severe  Tingling (pins and needles) in your arm, shoulder, or hand: Moderate  During the past week, how much difficulty have you had sleeping because of the pain in your arm, shoulder or hand?: Moderate Difficiculty  Number of Questions Answered: 11  Quick DASH Score: 34.09  Neck Disability Index  Section 1 - Pain Intensity: The pain comes and goes and is moderate.  Section 2 - Personal Care: I can look after myself normally, but it causes extra pain.  Section 3 - Lifting: I can lift heavy weights, but it causes extra pain.  Section 4 - Work: I can do most of my usual work, but no more  Section 5 - Headaches: I have moderate headaches that come frequently  Section 6 - Concentration: I can concentrate fully when I want to with slight difficulty.  Section 7 - Sleeping: My sleep is moderately disturbed (2-3 hours sleepless).  Section 8 - Driving: I can drive as long as I want with moderate neck pain.  Section 9 - Reading: I can read as much as I want with moderate neck pain.  Section 10 - Recreation: I am able to engage in a few of my usual recreational activities because of pain in my neck.  Neck Disability Index Score: 20  Neck Disability Index Comments: 20 = 40%      Time Calculation:     Start Time: 0806  Stop Time: 0845  Time Calculation (min): 39 min  Total Timed Code Minutes- PT: 0 minute(s)     Therapy Charges for Today       Code Description Service Date Service Provider Modifiers Qty    [id number]  PT EVAL LOW COMPLEXITY 3 8/23/2023 Nupur Orr, PT GP 1            PT G-Codes  Outcome Measure Options: Neck Disability Index (NDI), Quick DASH  Quick DASH Score: 34.09  Neck Disability Index Score: 20         Nupur Orr, PT  8/23/2023

## 2023-08-28 ENCOUNTER — HOSPITAL ENCOUNTER (OUTPATIENT)
Dept: PHYSICAL THERAPY | Facility: HOSPITAL | Age: 38
Setting detail: THERAPIES SERIES
Discharge: HOME | End: 2023-08-28
Payer: COMMERCIAL

## 2023-08-28 DIAGNOSIS — M54.2 CERVICALGIA: Primary | ICD-10-CM

## 2023-08-28 PROCEDURE — 97110 THERAPEUTIC EXERCISES: CPT | Performed by: PHYSICAL THERAPIST

## 2023-08-28 PROCEDURE — 97140 MANUAL THERAPY 1/> REGIONS: CPT | Performed by: PHYSICAL THERAPIST

## 2023-08-28 NOTE — THERAPY TREATMENT NOTE
Outpatient Physical Therapy Ortho Treatment Note  [facility]     Patient Name: Jayce Gonzalez III  : 1985  MRN: [id number]  Today's Date: 2023      Visit Date: 2023    Attendance: 2 visits  Subjective improvement: N/A  MD appt: MRI/CT 2023; MD end   Recheck due: 2023    Visit Dx:    ICD-10-CM ICD-9-CM   1. Cervicalgia  M54.2 723.1       Patient Active Problem List   Diagnosis    Neck pain    Obstructive sleep apnea, adult    Asthma    Cervicalgia    Cervical radiculopathy    Cubital tunnel syndrome on right    Non-smoker    Allergic rhinitis    Gastroesophageal reflux disease    Muscle weakness    Numbness and tingling in right hand    Skin sensation disturbance    Vertigo    Class 1 obesity due to excess calories without serious comorbidity with body mass index (BMI) of 31.0 to 31.9 in adult    Dyslipidemia        Past Medical History:   Diagnosis Date    Arthritis     Asthma     GERD (gastroesophageal reflux disease)     Hypertension     HAD AS A CHILD NEVER TOOK MED FOR    Sleep apnea     CPAP        Past Surgical History:   Procedure Laterality Date    CERVICAL FUSION      CHOLECYSTECTOMY WITH INTRAOPERATIVE CHOLANGIOGRAM N/A 2022    Procedure: LAPAROSCOPIC CHOLECYSTECTOMY;  Surgeon: Erin Sterling MD;  Location: [facility];  Service: General;  Laterality: N/A;    EYE SURGERY      FRACTURE SURGERY Right     Right arm     KNEE ARTHROSCOPY Right     TONSILLECTOMY AND ADENOIDECTOMY      ULNAR NERVE DECOMPRESSION Right 7/15/2020    Procedure: right CUBITAL TUNNEL RELEASE;  Surgeon: Jean-Paul Hardin MD;  Location: [facility];  Service: Neurosurgery;  Laterality: Right;        PT Ortho       Row Name 23 0900       Subjective Comments    Subjective Comments Pt states HEP seems to be going ok. States he thinks one of them is aggraviting his symptoms, unsure which one as they don't bother him while he does them. States he's also worked everyday so doesn't  "know if that's part of it too.  -KG       Precautions and Contraindications    Precautions C3-4 fusion, low back pain  -KG       Subjective Pain    Subjective Pain Comment mostly midline neck and R scapular area  -KG       Posture/Observations    Posture/Observations Comments Minor cues for posture throughout.  -KG              User Key  (r) = Recorded By, (t) = Taken By, (c) = Cosigned By      Initials Name Provider Type    Nupur Raman, PT Physical Therapist                                 PT Assessment/Plan       Row Name 08/28/23 0900          PT Assessment    Assessment Comments Pt overall did well with treatment. Good recall of HEP. Minor cues for seated and standing posture but overall displayed fair awareness. No increased pain or symptoms with any activities performed this date and did have decrease in neck pain & symptoms post treatment. Pt most TTP at R interscap area and midline c-spine C3-T1.  -KG        PT Plan    PT Frequency 2x/week  -KG     PT Plan Comments Update HEP next visit.  -KG               User Key  (r) = Recorded By, (t) = Taken By, (c) = Cosigned By      Initials Name Provider Type    Nupur Raman, PT Physical Therapist                       OP Exercises       Row Name 08/28/23 0900             Subjective Comments    Subjective Comments Pt states HEP seems to be going ok. States he thinks one of them is aggraviting his symptoms, unsure which one as they don't bother him while he does them. States he's also worked everyday so doesn't know if that's part of it too.  -KG         Subjective Pain    Able to rate subjective pain? yes  -KG      Pre-Treatment Pain Level 6  -KG      Post-Treatment Pain Level 3  -KG      Subjective Pain Comment mostly midline neck and R scapular area  -KG         Exercise 1    Exercise Name 1 Seated john UT stretch with over pressure  -KG      Reps 1 2  -KG      Time 1 30\" hold  -KG         Exercise 2    Exercise Name 2 Seated john levator scap " "stretch  -KG      Reps 2 2  -KG      Time 2 30\" hold  -KG      Additional Comments no over pressure  -KG         Exercise 3    Exercise Name 3 Low doorway pec stretch  -KG      Cueing 3 Verbal  -KG      Reps 3 2  -KG      Time 3 30\" hold  -KG         Exercise 4    Exercise Name 4 Standing tband mid rows with posture  -KG      Cueing 4 Verbal  -KG      Sets 4 2  -KG      Reps 4 10  -KG      Additional Comments red  -KG         Exercise 5    Exercise Name 5 Standing john shoulder ext/scap retraction with posture  -KG      Cueing 5 Verbal  -KG      Sets 5 2  -KG      Reps 5 10  -KG      Additional Comments red tband  -KG         Exercise 6    Exercise Name 6 Cervical ball/wall rotation with posture  -KG      Cueing 6 Verbal  -KG      Sets 6 1  -KG      Reps 6 15  -KG         Exercise 7    Exercise Name 7 Standing median nerve glides  -KG      Cueing 7 Verbal  -KG      Sets 7 1  -KG      Reps 7 10  -KG         Exercise 8    Exercise Name 8 Seated no moneys/posture  -KG      Cueing 8 Verbal  -KG      Sets 8 1  -KG      Reps 8 15  -KG      Time 8 pause  -KG         Exercise 9    Exercise Name 9 Seated chin tucks pillow/towel roll  -KG      Cueing 9 Verbal  -KG      Sets 9 1  -KG      Reps 9 15  -KG      Time 9 pause-3\" hold  -KG                User Key  (r) = Recorded By, (t) = Taken By, (c) = Cosigned By      Initials Name Provider Type    KG Nupur Orr, PT Physical Therapist                             Manual Rx (last 36 hours)       Manual Treatments       Row Name 08/28/23 0900             Manual Rx 1    Manual Rx 1 Location Prone: R>L interscap border, levator scap, UT  -KG      Manual Rx 1 Type STM/MFR  -KG      Manual Rx 1 Duration 6 min  -KG         Manual Rx 2    Manual Rx 2 Location Supine: cervical spine  -KG      Manual Rx 2 Type lateral glides C5/6, UPA's and C5-7, UT/levator STM  -KG      Manual Rx 2 Duration 4 min  -KG                User Key  (r) = Recorded By, (t) = Taken By, (c) = Cosigned By      " Initials Name Provider Type    ISATU Nupur Orr, PT Physical Therapist                     PT OP Goals       Row Name 08/28/23 0900          PT Short Term Goals    STG Date to Achieve 09/13/23  -KG     STG 1 Demonstrate independence/compliance in performance of initial HEP  -KG     STG 1 Progress Progressing  -KG     STG 2 Demonstrate improved seated posture/positioning with seated scap/cervical stabilization activities with minimal cues required for correction  -KG     STG 2 Progress Progressing  -KG     STG 3 Subjectively report 25% improvement in RUE radiating symptoms throughout a full work day  -KG     STG 3 Progress Progressing  -KG     STG 4 Demosntrate improved L cervical lateral flex to 30 deg or greater without increased pain on R  -KG     STG 4 Progress Progressing  -KG        Long Term Goals    LTG Date to Achieve 10/04/23  -KG     LTG 1 Subjectively report 50% overall improvement in pain/symptoms and functional activity tolerance/performance  -KG     LTG 2 Improve NDI score to 30% or less demonstrating imrpoved tolerance to daily functional activities  -KG     LTG 3 Demonstrate improve R shoulder flex/abd MMT to 5/5  -KG     LTG 4 Demonstrate improved R cervical rotation AROM to 65 deg without increased ipsilateral pain/symptoms  -KG     LTG 5 Demonstrate proper lifting/body mechanics when lifting lightly weighted crate from various surface heights without increased pain  -KG     LTG 6 Subjectively report/demonstrate no radiating RUE pain/symptoms distal to the elbow during full treatment session  -KG     LTG 7 Demonstrate improved R  strength to 75# or greater to assist with performance of work activities  -KG        Time Calculation    PT Goal Re-Cert Due Date 09/13/23  -KG               User Key  (r) = Recorded By, (t) = Taken By, (c) = Cosigned By      Initials Name Provider Type    ISATU Nupur Orr, PT Physical Therapist                    Therapy Education  Given: HEP,  Symptoms/condition management, Pain management, Posture/body mechanics  Program: Reinforced  How Provided: Verbal  Provided to: Patient  Level of Understanding: Verbalized, Demonstrated              Time Calculation:   Start Time: 0930  Stop Time: 1009  Time Calculation (min): 39 min  Therapy Charges for Today       Code Description Service Date Service Provider Modifiers Qty    05632615807  PT THER PROC EA 15 MIN 8/28/2023 Nupur Orr, PT GP 2    [id number]  PT MANUAL THERAPY EA 15 MIN 8/28/2023 Nupur Orr, PT GP 1                      Nupur Orr, PT  8/28/2023

## 2023-08-30 ENCOUNTER — HOSPITAL ENCOUNTER (OUTPATIENT)
Dept: PHYSICAL THERAPY | Facility: HOSPITAL | Age: 38
Setting detail: THERAPIES SERIES
Discharge: HOME | End: 2023-08-30
Payer: COMMERCIAL

## 2023-08-30 DIAGNOSIS — M54.2 CERVICALGIA: Primary | ICD-10-CM

## 2023-08-30 PROCEDURE — 97110 THERAPEUTIC EXERCISES: CPT

## 2023-08-30 PROCEDURE — 97140 MANUAL THERAPY 1/> REGIONS: CPT

## 2023-08-30 NOTE — THERAPY TREATMENT NOTE
Outpatient Physical Therapy Ortho Treatment Note  [facility]     Patient Name: Jayce Gonzalez III  : 1985  MRN: [id number]  Today's Date: 2023      Visit Date: 2023    Attendance: 3 visits  Subjective improvement: n/a  MD appt: MRI/CT 23; MD end   Recheck due: 23    Visit Dx:    ICD-10-CM ICD-9-CM   1. Cervicalgia  M54.2 723.1       Patient Active Problem List   Diagnosis    Neck pain    Obstructive sleep apnea, adult    Asthma    Cervicalgia    Cervical radiculopathy    Cubital tunnel syndrome on right    Non-smoker    Allergic rhinitis    Gastroesophageal reflux disease    Muscle weakness    Numbness and tingling in right hand    Skin sensation disturbance    Vertigo    Class 1 obesity due to excess calories without serious comorbidity with body mass index (BMI) of 31.0 to 31.9 in adult    Dyslipidemia        Past Medical History:   Diagnosis Date    Arthritis     Asthma     GERD (gastroesophageal reflux disease)     Hypertension     HAD AS A CHILD NEVER TOOK MED FOR    Sleep apnea     CPAP        Past Surgical History:   Procedure Laterality Date    CERVICAL FUSION      CHOLECYSTECTOMY WITH INTRAOPERATIVE CHOLANGIOGRAM N/A 2022    Procedure: LAPAROSCOPIC CHOLECYSTECTOMY;  Surgeon: Erin Sterling MD;  Location: [facility];  Service: General;  Laterality: N/A;    EYE SURGERY      FRACTURE SURGERY Right     Right arm     KNEE ARTHROSCOPY Right     TONSILLECTOMY AND ADENOIDECTOMY      ULNAR NERVE DECOMPRESSION Right 7/15/2020    Procedure: right CUBITAL TUNNEL RELEASE;  Surgeon: Jean-Paul Hardin MD;  Location: [facility];  Service: Neurosurgery;  Laterality: Right;        PT Ortho       Row Name 23 0800       Precautions and Contraindications    Precautions C3-4 fusion, low back pain  -KM       Subjective Pain    Able to rate subjective pain? yes  -KM       Posture/Observations    Posture/Observations Comments cues for form with UE neck stretches   -KM              User Key  (r) = Recorded By, (t) = Taken By, (c) = Cosigned By      Initials Name Provider Type    Za Laura PTA Physical Therapist Assistant                                 PT Assessment/Plan       Row Name 08/30/23 0800          PT Assessment    Assessment Comments pt did fairly good throughout. pt not hurting as bad today due to not coming from work. pt cont to have most of his pain on R side on neck down into levator. pt had fairly good posture throughout. Did well with tband rows and ext therefore added to HEP. [place] about the same as when he came in post visit  -KM        PT Plan    PT Frequency 2x/week  -KM     PT Plan Comments Follow up with MRI. Consider cspine isos  -KM               User Key  (r) = Recorded By, (t) = Taken By, (c) = Cosigned By      Initials Name Provider Type    Za Laura PTA Physical Therapist Assistant                       OP Exercises       Row Name 08/30/23 0800             Subjective Comments    Subjective Comments pt states that he didnt have to work last night so neck is feeling decent  -KM         Subjective Pain    Able to rate subjective pain? yes  -KM      Pre-Treatment Pain Level 2  -KM      Post-Treatment Pain Level 2  -KM         Exercise 1    Exercise Name 1 Seated john UT stretch with over pressure  -KM      Reps 1 2  -KM      Time 1 30 sec hold  -KM         Exercise 2    Exercise Name 2 Seated john levator scap stretch  -KM      Reps 2 2  -KM      Time 2 30 sec hold  -KM         Exercise 3    Exercise Name 3 Low doorway pec stretch  -KM      Reps 3 2  -KM      Time 3 30 sec hold  -KM         Exercise 4    Exercise Name 4 Standing tband mid rows with posture  -KM      Sets 4 2  -KM      Reps 4 10  -KM      Additional Comments green  -KM         Exercise 5    Exercise Name 5 Standing john shoulder ext/scap retraction with posture  -KM      Sets 5 2  -KM      Reps 5 10  -KM      Additional Comments green  -KM         Exercise  6    Exercise Name 6 Seated no moneys  -KM      Sets 6 2  -KM      Reps 6 10  -KM      Time 6 pause  -KM         Exercise 7    Exercise Name 7 Cspine ball/wall rotation  -KM      Sets 7 1  -KM      Reps 7 15  -KM      Time 7 pause  -KM         Exercise 8    Exercise Name 8 Standing median nerve glides  -KM      Sets 8 1  -KM      Reps 8 15  -KM         Exercise 9    Exercise Name 9 Seated chin tucks  -KM      Sets 9 1  -KM      Reps 9 15  -KM      Time 9 pause  -KM                User Key  (r) = Recorded By, (t) = Taken By, (c) = Cosigned By      Initials Name Provider Type    Za Laura PTA Physical Therapist Assistant                             Manual Rx (last 36 hours)       Manual Treatments       Row Name 08/30/23 0700             Manual Rx 1    Manual Rx 1 Location Prone: R>L interscap border, levator scap, UT  -KM      Manual Rx 1 Type STM/MFR  -KM      Manual Rx 1 Duration 6 min  -KM         Manual Rx 2    Manual Rx 2 Location Supine: cervical spine  -KM      Manual Rx 2 Type lateral glides C5/6, UPA's and C5-7, UT/levator STM  -KM      Manual Rx 2 Duration 6 min  -KM                User Key  (r) = Recorded By, (t) = Taken By, (c) = Cosigned By      Initials Name Provider Type    Za Laura PTA Physical Therapist Assistant                     PT OP Goals       Row Name 08/30/23 0800          PT Short Term Goals    STG Date to Achieve 09/13/23  -KM     STG 1 Demonstrate independence/compliance in performance of initial HEP  -KM     STG 1 Progress Progressing  -KM     STG 2 Demonstrate improved seated posture/positioning with seated scap/cervical stabilization activities with minimal cues required for correction  -KM     STG 2 Progress Progressing  -KM     STG 3 Subjectively report 25% improvement in RUE radiating symptoms throughout a full work day  -KM     STG 3 Progress Progressing  -KM     STG 4 Demosntrate improved L cervical lateral flex to 30 deg or greater without  increased pain on R  -KM     STG 4 Progress Progressing  -KM        Long Term Goals    LTG Date to Achieve 10/04/23  -KM     LTG 1 Subjectively report 50% overall improvement in pain/symptoms and functional activity tolerance/performance  -KM     LTG 2 Improve NDI score to 30% or less demonstrating imrpoved tolerance to daily functional activities  -KM     LTG 3 Demonstrate improve R shoulder flex/abd MMT to 5/5  -KM     LTG 4 Demonstrate improved R cervical rotation AROM to 65 deg without increased ipsilateral pain/symptoms  -KM     LTG 5 Demonstrate proper lifting/body mechanics when lifting lightly weighted crate from various surface heights without increased pain  -KM     LTG 6 Subjectively report/demonstrate no radiating RUE pain/symptoms distal to the elbow during full treatment session  -KM     LTG 7 Demonstrate improved R  strength to 75# or greater to assist with performance of work activities  -KM        Time Calculation    PT Goal Re-Cert Due Date 09/13/23  -KM               User Key  (r) = Recorded By, (t) = Taken By, (c) = Cosigned By      Initials Name Provider Type     Za Cobian PTA Physical Therapist Assistant                    Therapy Education  Education Details: tband mid rows/shoulder ext; green  Given: HEP, Symptoms/condition management, Pain management, Posture/body mechanics  Program: Reinforced  How Provided: Verbal  Provided to: Patient  Level of Understanding: Verbalized, Demonstrated              Time Calculation:   Start Time: 0800  Stop Time: 0850  Time Calculation (min): 50 min  Therapy Charges for Today       Code Description Service Date Service Provider Modifiers Qty    77742683290 HC PT THER PROC EA 15 MIN 8/30/2023 Za Cobian PTA GP, CQ 2    17179881276 HC PT MANUAL THERAPY EA 15 MIN 8/30/2023 Za Cobian PTA GP, CQ 1                      aZ Cobian PTA  8/30/2023

## 2023-09-05 ENCOUNTER — HOSPITAL ENCOUNTER (OUTPATIENT)
Dept: MRI IMAGING | Facility: HOSPITAL | Age: 38
Discharge: HOME | End: 2023-09-05
Payer: COMMERCIAL

## 2023-09-05 ENCOUNTER — HOSPITAL ENCOUNTER (OUTPATIENT)
Dept: CT IMAGING | Facility: HOSPITAL | Age: 38
Discharge: HOME | End: 2023-09-05
Payer: COMMERCIAL

## 2023-09-05 DIAGNOSIS — M54.2 CERVICAL PAIN: ICD-10-CM

## 2023-09-05 DIAGNOSIS — M54.2 CERVICALGIA: ICD-10-CM

## 2023-09-05 PROCEDURE — 72141 MRI NECK SPINE W/O DYE: CPT

## 2023-09-05 PROCEDURE — 72125 CT NECK SPINE W/O DYE: CPT

## 2023-09-06 ENCOUNTER — HOSPITAL ENCOUNTER (OUTPATIENT)
Dept: PHYSICAL THERAPY | Facility: HOSPITAL | Age: 38
Setting detail: THERAPIES SERIES
Discharge: HOME | End: 2023-09-06
Payer: COMMERCIAL

## 2023-09-06 DIAGNOSIS — M54.2 CERVICALGIA: Primary | ICD-10-CM

## 2023-09-06 PROCEDURE — 97140 MANUAL THERAPY 1/> REGIONS: CPT

## 2023-09-06 PROCEDURE — 97110 THERAPEUTIC EXERCISES: CPT

## 2023-09-06 NOTE — THERAPY TREATMENT NOTE
Outpatient Physical Therapy Ortho Treatment Note  [facility]     Patient Name: Jayce Gonzalez III  : 1985  MRN: [id number]  Today's Date: 2023      Visit Date: 2023    Attendance: 4 visits  Subjective improvement: n/a  MD appt: 23  Recheck due: 23    Visit Dx:    ICD-10-CM ICD-9-CM   1. Cervicalgia  M54.2 723.1       Patient Active Problem List   Diagnosis    Neck pain    Obstructive sleep apnea, adult    Asthma    Cervicalgia    Cervical radiculopathy    Cubital tunnel syndrome on right    Non-smoker    Allergic rhinitis    Gastroesophageal reflux disease    Muscle weakness    Numbness and tingling in right hand    Skin sensation disturbance    Vertigo    Class 1 obesity due to excess calories without serious comorbidity with body mass index (BMI) of 31.0 to 31.9 in adult    Dyslipidemia        Past Medical History:   Diagnosis Date    Arthritis     Asthma     GERD (gastroesophageal reflux disease)     Hypertension     HAD AS A CHILD NEVER TOOK MED FOR    Sleep apnea     CPAP        Past Surgical History:   Procedure Laterality Date    CERVICAL FUSION      CHOLECYSTECTOMY WITH INTRAOPERATIVE CHOLANGIOGRAM N/A 2022    Procedure: LAPAROSCOPIC CHOLECYSTECTOMY;  Surgeon: Erin Sterling MD;  Location: [facility];  Service: General;  Laterality: N/A;    EYE SURGERY      FRACTURE SURGERY Right     Right arm     KNEE ARTHROSCOPY Right     TONSILLECTOMY AND ADENOIDECTOMY      ULNAR NERVE DECOMPRESSION Right 7/15/2020    Procedure: right CUBITAL TUNNEL RELEASE;  Surgeon: Jean-Paul Hardin MD;  Location: [facility] OR;  Service: Neurosurgery;  Laterality: Right;        PT Ortho       Row Name 23 0700       Precautions and Contraindications    Precautions C3-4 fusion, low back pain  -KM       Subjective Pain    Able to rate subjective pain? yes  -KM       Posture/Observations    Posture/Observations Comments fair posture throughout. Tender to R shoulder blade  -KM               User Key  (r) = Recorded By, (t) = Taken By, (c) = Cosigned By      Initials Name Provider Type    Za Laura PTA Physical Therapist Assistant                                 PT Assessment/Plan       Row Name 09/06/23 0700          PT Assessment    Assessment Comments pt did well with treatment today. Has good recall of therex and is complaint with current HEP. Gave yellow tband for no moneys. pt did well with intro into cspine isos. Had mild decrease in pain post manual soft tissue work  -KM        PT Plan    PT Frequency 2x/week  -KM     PT Plan Comments Cont cspine isos. Possible supine cspine clocks  -KM               User Key  (r) = Recorded By, (t) = Taken By, (c) = Cosigned By      Initials Name Provider Type    Za Laura PTA Physical Therapist Assistant                       OP Exercises       Row Name 09/06/23 0700             Subjective Comments    Subjective Comments pt states that he is doing okay. 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  -KM         Subjective Pain    Able to rate subjective pain? yes  -KM      Pre-Treatment Pain Level 4  -KM      Post-Treatment Pain Level 3  -KM         Exercise 1    Exercise Name 1 Seated john UT stretch with over pressure  -KM      Reps 1 2  -KM      Time 1 30 sec hold  -KM         Exercise 2    Exercise Name 2 Seated john levator scap stretch  -KM      Reps 2 2  -KM      Time 2 30 sec hold  -KM         Exercise 3    Exercise Name 3 Low doorway pec stretch  -KM      Reps 3 2  -KM      Time 3 30 sec hold  -KM         Exercise 4    Exercise Name 4 Standing tband mid rows with posture  -KM      Sets 4 2  -KM      Reps 4 10  -KM      Additional Comments green  -KM         Exercise 5    Exercise Name 5 Standing john shoulder ext/scap retraction with posture  -KM      Sets 5 2  -KM      Reps 5 10  -KM      Additional Comments green  -KM         Exercise 6    Exercise Name 6 Standing no moneys  -KM       Sets 6 1  -KM      Reps 6 15  -KM      Additional Comments yellow  -KM         Exercise 7    Exercise Name 7 Cspine ball/wall rotation  -KM      Sets 7 1  -KM      Reps 7 15  -KM      Time 7 pause  -KM         Exercise 8    Exercise Name 8 Standing median nerve glides  -KM      Sets 8 1  -KM      Reps 8 15  -KM         Exercise 9    Exercise Name 9 Seated chin tucks  -KM      Sets 9 1  -KM      Reps 9 15  -KM      Time 9 3 sec hold  -KM         Exercise 10    Exercise Name 10 Seated cspine isos: FF/ B SB  -KM      Sets 10 1  -KM      Reps 10 10  -KM      Time 10 5 sec hold  -KM         Exercise 11    Exercise Name 11 Supine horz abd  -KM      Sets 11 1  -KM      Reps 11 10  -KM      Additional Comments yellow  -KM                User Key  (r) = Recorded By, (t) = Taken By, (c) = Cosigned By      Initials Name Provider Type    Za Laura PTA Physical Therapist Assistant                             Manual Rx (last 36 hours)       Manual Treatments       Row Name 09/06/23 0700             Manual Rx 1    Manual Rx 1 Location Prone: R>L interscap border, levator scap, UT  -KM      Manual Rx 1 Type STM/MFR  -KM      Manual Rx 1 Duration 6 min  -KM         Manual Rx 2    Manual Rx 2 Location Supine: cervical spine  -KM      Manual Rx 2 Type lateral glides C5/6, UPA's and C5-7, UT/levator STM  -KM      Manual Rx 2 Duration 6 min  -KM                User Key  (r) = Recorded By, (t) = Taken By, (c) = Cosigned By      Initials Name Provider Type    Za Laura PTA Physical Therapist Assistant                     PT OP Goals       Row Name 09/06/23 0800          PT Short Term Goals    STG Date to Achieve 09/13/23  -KM     STG 1 Demonstrate independence/compliance in performance of initial HEP  -KM     STG 1 Progress Progressing  -KM     STG 2 Demonstrate improved seated posture/positioning with seated scap/cervical stabilization activities with minimal cues required for correction  -KM     STG  2 Progress Progressing  -KM     STG 3 Subjectively report 25% improvement in RUE radiating symptoms throughout a full work day  -KM     STG 3 Progress Progressing  -KM     STG 4 Demosntrate improved L cervical lateral flex to 30 deg or greater without increased pain on R  -KM     STG 4 Progress Progressing  -KM        Long Term Goals    LTG Date to Achieve 10/04/23  -KM     LTG 1 Subjectively report 50% overall improvement in pain/symptoms and functional activity tolerance/performance  -KM     LTG 2 Improve NDI score to 30% or less demonstrating imrpoved tolerance to daily functional activities  -KM     LTG 3 Demonstrate improve R shoulder flex/abd MMT to 5/5  -KM     LTG 4 Demonstrate improved R cervical rotation AROM to 65 deg without increased ipsilateral pain/symptoms  -KM     LTG 5 Demonstrate proper lifting/body mechanics when lifting lightly weighted crate from various surface heights without increased pain  -KM     LTG 6 Subjectively report/demonstrate no radiating RUE pain/symptoms distal to the elbow during full treatment session  -KM     LTG 7 Demonstrate improved R  strength to 75# or greater to assist with performance of work activities  -KM        Time Calculation    PT Goal Re-Cert Due Date 09/13/23  -KM               User Key  (r) = Recorded By, (t) = Taken By, (c) = Cosigned By      Initials Name Provider Type     Za Snyder PTA Physical Therapist Assistant                    Therapy Education  Given: HEP, Symptoms/condition management, Pain management, Posture/body mechanics  Program: Reinforced  How Provided: Verbal  Provided to: Patient  Level of Understanding: Verbalized, Demonstrated              Time Calculation:   Start Time: 0800  Stop Time: 0845  Time Calculation (min): 45 min  Therapy Charges for Today       Code Description Service Date Service Provider Modifiers Qty    79994330667  PT THER PROC EA 15 MIN 9/6/2023 Za Snyder PTA GP, CQ 2    [id number]   PT MANUAL THERAPY EA 15 MIN 9/6/2023 Za Cobian, PTA GP, CQ 1                      Za Cobian, FRANCISCA  9/6/2023

## 2023-09-11 ENCOUNTER — HOSPITAL ENCOUNTER (OUTPATIENT)
Dept: PHYSICAL THERAPY | Facility: HOSPITAL | Age: 38
Setting detail: THERAPIES SERIES
Discharge: HOME | End: 2023-09-11
Payer: COMMERCIAL

## 2023-09-11 DIAGNOSIS — M54.2 CERVICALGIA: Primary | ICD-10-CM

## 2023-09-11 PROCEDURE — 97110 THERAPEUTIC EXERCISES: CPT | Performed by: PHYSICAL THERAPIST

## 2023-09-11 PROCEDURE — 97140 MANUAL THERAPY 1/> REGIONS: CPT | Performed by: PHYSICAL THERAPIST

## 2023-09-12 NOTE — THERAPY PROGRESS REPORT/RE-CERT
"  Outpatient Physical Therapy Ortho Progress Note  [facility]     Patient Name: Jayce Gonzalez III  : 1985  MRN: [id number]  Today's Date: 2023      Visit Date: 2023    Attendance: 5 visits  Subjective improvement: \"not really much, some short term relief but doesn't last\"  MD appt: 2023  Recheck due: 10/2/2023      Visit Dx:    ICD-10-CM ICD-9-CM   1. Cervicalgia  M54.2 723.1       Patient Active Problem List   Diagnosis    Neck pain    Obstructive sleep apnea, adult    Asthma    Cervicalgia    Cervical radiculopathy    Cubital tunnel syndrome on right    Non-smoker    Allergic rhinitis    Gastroesophageal reflux disease    Muscle weakness    Numbness and tingling in right hand    Skin sensation disturbance    Vertigo    Class 1 obesity due to excess calories without serious comorbidity with body mass index (BMI) of 31.0 to 31.9 in adult    Dyslipidemia        Past Medical History:   Diagnosis Date    Arthritis     Asthma     GERD (gastroesophageal reflux disease)     Hypertension     HAD AS A CHILD NEVER TOOK MED FOR    Sleep apnea     CPAP        Past Surgical History:   Procedure Laterality Date    CERVICAL FUSION      CHOLECYSTECTOMY WITH INTRAOPERATIVE CHOLANGIOGRAM N/A 2022    Procedure: LAPAROSCOPIC CHOLECYSTECTOMY;  Surgeon: Erin Sterling MD;  Location: [facility] OR;  Service: General;  Laterality: N/A;    EYE SURGERY      FRACTURE SURGERY Right     Right arm     KNEE ARTHROSCOPY Right     TONSILLECTOMY AND ADENOIDECTOMY      ULNAR NERVE DECOMPRESSION Right 7/15/2020    Procedure: right CUBITAL TUNNEL RELEASE;  Surgeon: Jean-Paul Hardin MD;  Location: [facility] OR;  Service: Neurosurgery;  Laterality: Right;        PT Ortho       Row Name 23 0800       Subjective    Subjective Comments Pt stats he's hurting more today. Did a lot at work to include repetitive shoveling. States pain is usually worse after he works. On days he doesn't work, pain is low. States " exercises do seem to help while he does them but no lasting effects. Reports minimal improvement.  -KG       Precautions and Contraindications    Precautions C3-4 fusion, low back pain  -KG       Posture/Observations    Posture/Observations Comments No acute distress. Fair overall postural awareness with standing and seated therex. Slighly rounded shoulders  -KG       Neural Tension Signs- Upper Quarter Clearing    ULNTT 1 Right:;Postive  -KG    ULNTT 4 Right:;Postive  -KG       Sensory Screen for Light Touch- Upper Quarter Clearing    C4 (posterior shoulder) Bilateral:;Intact  -KG    C5 (lateral upper arm) Bilateral:;Intact  -KG    C6 (tip of thumb) Bilateral:;Intact  -KG    C7 (tip of 3rd finger) Bilateral:;Intact  -KG    C8 (tip of 5th finger) Right:;Diminished;Left:;Intact  -KG    T1 (medial lower arm) Bilateral:;Intact  -KG       Cervical Palpation    Suboccipital Guarded/taut  -KG    Levator Scapula Bilateral:;Tender;Guarded/taut  R>L  -KG    Upper Traps Bilateral:;Tender;Guarded/taut  -KG    Middle Traps Right:;Tender;Guarded/taut  -KG       Thoracic Accessory Motions    Pa glide- Upper thoracic Center:;Hypomobile  -KG    Pa glide- Middle thoracic Center:;Hypomobile  -KG       Head/Neck/Trunk    Neck Extension AROM 55 deg  -KG    Neck Flexion AROM 53 deg  -KG    Neck Lt Lateral Flexion AROM 25 deg  -KG    Neck Rt Lateral Flexion AROM 31 deg  -KG    Neck Lt Rotation AROM 70 deg  -KG    Neck Rt Rotation AROM 60 deg  -KG       MMT Right Upper Ext    Rt Shoulder Flexion MMT, Gross Movement (5/5) normal  -KG    Rt Shoulder ABduction MMT, Gross Movement (4+/5) good plus  -KG    Rt Elbow Flexion MMT, Gross Movement: (5/5) normal  -KG    Rt Elbow Extension MMT, Gross Movement: (5/5) normal  -KG    Rt Wrist Flexion MMT, Gross Movement (5/5) normal  -KG    Rt Wrist Extension MMT, Gross Movement (5/5) normal  -KG       MMT Left Upper Ext    Lt Shoulder Flexion MMT, Gross Movement (5/5) normal  -KG    Lt Shoulder  ABduction MMT, Gross Movement (5/5) normal  -KG    Lt Elbow Flexion MMT, Gross Movement (5/5) normal  -KG    Lt Elbow Extension MMT, Gross Movement (5/5) normal  -KG    Lt Wrist Flexion MMT, Gross Movement (5/5) normal  -KG    Lt Wrist Extension MMT, Gross Movement (5/5) normal  -KG        Strength Right    # Reps 3  -KG    Right Rung 2  -KG    Right  Test 1 75  -KG    Right  Test 2 65  -KG    Right  Test 3 50  -KG     Strength Average Right 63.33  -KG        Strength Left    # Reps 3  -KG    Left Rung 2  -KG       Sensation    Sensation WNL? WFL  -KG    Light Touch Partial deficits in the RUE  -KG       Upper Extremity Flexibility    Upper Trapezius Bilateral:;Mildly limited  -KG    Pect Minor Bilateral:;Mildly limited  -KG              User Key  (r) = Recorded By, (t) = Taken By, (c) = Cosigned By      Initials Name Provider Type    KG Nupur Orr, PT Physical Therapist                                 PT Assessment/Plan       Row Name 09/11/23 0800          PT Assessment    Functional Limitations Limitation in home management;Limitations in community activities;Performance in leisure activities;Performance in work activities;Limitations in functional capacity and performance  -KG     Impairments Impaired flexibility;Impaired muscle endurance;Muscle strength;Pain;Poor body mechanics;Posture;Range of motion  -KG     Assessment Comments Pt progressing slowly and making minimal progress with PT. Pt does have decreased pain and symptoms after treatment but relief is temporary. Pt notes the exercises do seem to help at the time but overall his radicular symptoms have not changed. Pt’s pan & symptoms are increased on days that he works. Pt having increased pain levels this date after work, as he’s been doing a lot of upper body/repetitive work. Some mild improvements in cervical mobility but WFL overall. Continues with median/ulnar nerve tension in RUE. Has some relief in symptoms with  manual interventions and soft tissue work. Pt returns to MD later this week for follow up, progress note sent with pt. Pt is compliant with HEP overall. Pt still needs work on cervical/scap stability, centralization of RUE pain/symptoms, functional mobility, & activity tolerance & will benefit from continued skilled PT services. Will follow up on MD appt and cont as advised.  -KG     Rehab Potential Good  -KG     Patient/caregiver participated in establishment of treatment plan and goals Yes  -KG     Patient would benefit from skilled therapy intervention Yes  -KG        PT Plan    PT Frequency 2x/week  -KG     Predicted Duration of Therapy Intervention (PT) 6-8 visits  -KG     PT Plan Comments Follow up on MD appt. Resume stability therex next visit. Continue manual ineterventions and soft tissue work  -KG               User Key  (r) = Recorded By, (t) = Taken By, (c) = Cosigned By      Initials Name Provider Type    KG Nupur Orr, PT Physical Therapist                       OP Exercises       Row Name 09/11/23 0800             Subjective Pain    Able to rate subjective pain? yes  -KG      Pre-Treatment Pain Level 8  -KG      Post-Treatment Pain Level 5  -KG         Exercise 1    Exercise Name 1 Seated john UT stretch with over pressure  -KG      Reps 1 2  -KG      Time 1 30 sec hold  -KG         Exercise 2    Exercise Name 2 Seated john levator scap stretch  -KG      Reps 2 2  -KG      Time 2 30 sec hold  -KG         Exercise 3    Exercise Name 3 Low doorway pec stretch  -KG      Reps 3 2  -KG      Time 3 30 sec hold  -KG         Exercise 4    Exercise Name 4 Standing no moneys  -KG      Cueing 4 Verbal  -KG      Sets 4 2  -KG      Reps 4 10  -KG      Additional Comments green  -KG         Exercise 6    Exercise Name 6 Cspine ball/wall rotation  -KG                User Key  (r) = Recorded By, (t) = Taken By, (c) = Cosigned By      Initials Name Provider Type    Nupur Raman, PT Physical Therapist       "            Exercise 5   Exercise Name 5 Standing john shoulder ext/scap retraction with posture   Sets 5 1   Reps 5 15   Additional Comments green   Exercise 6   Exercise Name 6 Cspine ball/wall rotation   Sets 6 1   Reps 6 15   Exercise 7   Exercise Name 7 C-spine ball/wall B SB isometrics   Cueing 7 Verbal   Sets 7 1   Reps 7 10   Time 7 5\" hold   Additional Comments had pain with flex so held   Exercise 8   Exercise Name 8 Seated no moneys   Cueing 8 Verbal   Sets 8 1   Reps 8 15   Exercise 9   Exercise Name 9 Cervical/BUE ROM & strength measurements; recheck                Manual Rx (last 36 hours)       Manual Treatments       Row Name 09/11/23 0800             Manual Rx 2    Manual Rx 2 Location Supine: cervical spine  -KG      Manual Rx 2 Type lateral glides C5/6, UPA's and C5-7, UT/levator STM  -KG      Manual Rx 2 Duration 12 min  -KG                User Key  (r) = Recorded By, (t) = Taken By, (c) = Cosigned By      Initials Name Provider Type    KG Nupur Orr, PT Physical Therapist                     PT OP Goals       Row Name 09/11/23 0800          PT Short Term Goals    STG Date to Achieve 09/13/23  -KG     STG 1 Demonstrate independence/compliance in performance of initial HEP  -KG     STG 1 Progress Met  -KG     STG 2 Demonstrate improved seated posture/positioning with seated scap/cervical stabilization activities with minimal cues required for correction  -KG     STG 2 Progress Met  -KG     STG 3 Subjectively report 25% improvement in RUE radiating symptoms throughout a full work day  -KG     STG 3 Progress Progressing  -KG     STG 4 Demosntrate improved L cervical lateral flex to 30 deg or greater without increased pain on R  -KG     STG 4 Progress Progressing  -KG        Long Term Goals    LTG Date to Achieve 10/04/23  -KG     LTG 1 Subjectively report 50% overall improvement in pain/symptoms and functional activity tolerance/performance  -KG     LTG 1 Progress Progressing  -KG     LTG 2 " Improve NDI score to 30% or less demonstrating imrpoved tolerance to daily functional activities  -KG     LTG 2 Progress Progressing  -KG     LTG 3 Demonstrate improve R shoulder flex/abd MMT to 5/5  -KG     LTG 3 Progress Progressing  -KG     LTG 4 Demonstrate improved R cervical rotation AROM to 65 deg without increased ipsilateral pain/symptoms  -KG     LTG 4 Progress Partially Met;Progressing  -KG     LTG 5 Demonstrate proper lifting/body mechanics when lifting lightly weighted crate from various surface heights without increased pain  -KG     LTG 5 Progress Progressing  -KG     LTG 6 Subjectively report/demonstrate no radiating RUE pain/symptoms distal to the elbow during full treatment session  -KG     LTG 6 Progress Progressing  -KG     LTG 7 Demonstrate improved R  strength to 75# or greater to assist with performance of work activities  -KG     LTG 7 Progress Progressing  -KG        Time Calculation    PT Goal Re-Cert Due Date 09/13/23  -KG               User Key  (r) = Recorded By, (t) = Taken By, (c) = Cosigned By      Initials Name Provider Type    KG Nupur Orr, PT Physical Therapist                    Therapy Education  Given: HEP, Symptoms/condition management, Pain management, Posture/body mechanics  Program: Reinforced  How Provided: Verbal  Provided to: Patient  Level of Understanding: Verbalized, Demonstrated    Outcome Measure Options: Neck Disability Index (NDI), Quick DASH  Quick DASH  Open a tight or new jar.: Mild Difficulty  Do heavy household chores (e.g., wash walls, wash floors): Moderate Difficulty  Carry a shopping bag or briefcase: Mild Difficulty  Wash your back: No Difficulty  Use a knife to cut food: No Difficulty  Recreational activities in which you take some force or impact through your arm, should or hand (e.g. golf, hammering, tennis, etc.): Moderate Difficulty  During the past week, to what extent has your arm, shoulder, or hand problem interfered with your normal  social activites with family, friends, neighbors or groups?: Moderately  During the past week, were you limited in your work or other regular daily activities as a result of your arm, shoulder or hand problem?: Slightly Limited  Arm, Shoulder, or hand pain: Severe  Tingling (pins and needles) in your arm, shoulder, or hand: Moderate  During the past week, how much difficulty have you had sleeping because of the pain in your arm, shoulder or hand?: Moderate Difficiculty  Number of Questions Answered: 11  Quick DASH Score: 36.36  Neck Disability Index  Section 1 - Pain Intensity: The pain is moderate and does not vary much.  Section 2 - Personal Care: I can look after myself normally, but it causes extra pain.  Section 3 - Lifting: I can lift heavy weights, but it causes extra pain.  Section 4 - Work: I can do most of my usual work, but no more  Section 5 - Headaches: I have moderate headaches that come frequently  Section 6 - Concentration: I can concentrate fully when I want to with slight difficulty.  Section 7 - Sleeping: My sleep is moderately disturbed (2-3 hours sleepless).  Section 8 - Driving: I can drive as long as I want with moderate neck pain.  Section 9 - Reading: I can read as much as I want with moderate neck pain.  Section 10 - Recreation: I am able to engage in a few of my usual recreational activities because of pain in my neck.  Neck Disability Index Score: 21  Neck Disability Index Comments: 21 = 42%      Time Calculation:   Start Time: 0806  Stop Time: 0845  Time Calculation (min): 39 min    Therapy Charges for Today       Code Description Service Date Service Provider Modifiers Qty    07424706966 HC PT THER PROC EA 15 MIN 9/11/2023 Nupur Orr, PT GP 2    67613074183 HC PT MANUAL THERAPY EA 15 MIN 9/11/2023 Nupur Orr, PT GP 1              PT G-Codes  Outcome Measure Options: Neck Disability Index (NDI), Quick DASH  Quick DASH Score: 36.36  Neck Disability Index Score: 21          Nupur Orr, PT  9/12/2023

## 2023-09-19 ENCOUNTER — HOSPITAL ENCOUNTER (OUTPATIENT)
Dept: PHYSICAL THERAPY | Facility: HOSPITAL | Age: 38
Setting detail: THERAPIES SERIES
Discharge: HOME | End: 2023-09-19
Payer: COMMERCIAL

## 2023-09-19 DIAGNOSIS — M54.2 CERVICALGIA: Primary | ICD-10-CM

## 2023-09-19 PROCEDURE — 97110 THERAPEUTIC EXERCISES: CPT | Performed by: PHYSICAL THERAPIST

## 2023-09-19 PROCEDURE — 97140 MANUAL THERAPY 1/> REGIONS: CPT | Performed by: PHYSICAL THERAPIST

## 2023-09-19 NOTE — THERAPY TREATMENT NOTE
"  Outpatient Physical Therapy Ortho Treatment Note  [facility]     Patient Name: Jayce Gonzalez III  : 1985  MRN: [id number]  Today's Date: 2023      Visit Date: 2023    Attendance: 6 visits  Subjective improvement: \"not really much, some short term relief but doesn't last\"  MD appt: PRN; new ortho on 2023  Recheck due: 10/2/2023    Visit Dx:    ICD-10-CM ICD-9-CM   1. Cervicalgia  M54.2 723.1       Patient Active Problem List   Diagnosis    Neck pain    Obstructive sleep apnea, adult    Asthma    Cervicalgia    Cervical radiculopathy    Cubital tunnel syndrome on right    Non-smoker    Allergic rhinitis    Gastroesophageal reflux disease    Muscle weakness    Numbness and tingling in right hand    Skin sensation disturbance    Vertigo    Class 1 obesity due to excess calories without serious comorbidity with body mass index (BMI) of 31.0 to 31.9 in adult    Dyslipidemia        Past Medical History:   Diagnosis Date    Arthritis     Asthma     GERD (gastroesophageal reflux disease)     Hypertension     HAD AS A CHILD NEVER TOOK MED FOR    Sleep apnea     CPAP        Past Surgical History:   Procedure Laterality Date    CERVICAL FUSION      CHOLECYSTECTOMY WITH INTRAOPERATIVE CHOLANGIOGRAM N/A 2022    Procedure: LAPAROSCOPIC CHOLECYSTECTOMY;  Surgeon: Erin Sterling MD;  Location: [facility] OR;  Service: General;  Laterality: N/A;    EYE SURGERY      FRACTURE SURGERY Right     Right arm     KNEE ARTHROSCOPY Right     TONSILLECTOMY AND ADENOIDECTOMY      ULNAR NERVE DECOMPRESSION Right 7/15/2020    Procedure: right CUBITAL TUNNEL RELEASE;  Surgeon: Jean-Paul Hardin MD;  Location: [facility] OR;  Service: Neurosurgery;  Laterality: Right;        PT Ortho       Row Name 23 0800       Subjective    Subjective Comments Pt states he didn't work last night so is feeling ok. Saw MD about his neck. Referred him to another ortho in same office about his shoulder for further " assessment. States it might have something to do wtih his brachial plexus. Pt would like to be on hold until he sees him next week.  -KG       Precautions and Contraindications    Precautions C3-4 fusion, low back pain  -KG       Subjective Pain    Able to rate subjective pain? yes  -KG    Pre-Treatment Pain Level 4  -KG       Posture/Observations    Posture/Observations Comments Fair overall postural awareness throughout treatment. Fatigues with prone scap stabiliyt, ernestine at mid trap  -KG              User Key  (r) = Recorded By, (t) = Taken By, (c) = Cosigned By      Initials Name Provider Type    Nupur Raman, PT Physical Therapist                                 PT Assessment/Plan       Row Name 09/19/23 0800          PT Assessment    Assessment Comments Pt did well with treatment. Most of pt's pain complaints this date at UT/levator scap area and into R interscap area. Pt fatigues easily with prone horiz abd, ernestine on R side. Did well wtih cervical iso's without increased pain. Good overall performance with all therex activities. Pt to see another ortho MD next week in regard to his symptoms. Will place pt on hold until that time to conserve insurance visits.  -KG        PT Plan    PT Frequency 2x/week  -KG     PT Plan Comments Will place pt on hold at this time. Pt sees new ortho MD next week. Pt to call and let us know further dispostion  -KG               User Key  (r) = Recorded By, (t) = Taken By, (c) = Cosigned By      Initials Name Provider Type    Nupur Raman, PT Physical Therapist                       OP Exercises       Row Name 09/19/23 0800             Subjective    Subjective Comments Pt states he didn't work last night so is feeling ok. Saw MD about his neck. Referred him to another ortho in same office about his shoulder for further assessment. States it might have something to do wtih his brachial plexus. Pt would like to be on hold until he sees him next week.  -KG          "Subjective Pain    Able to rate subjective pain? yes  -KG      Pre-Treatment Pain Level 4  -KG      Post-Treatment Pain Level 2  -KG         Exercise 1    Exercise Name 1 Seated john UT stretch with over pressure  -KG      Reps 1 2  -KG      Time 1 30 sec hold  -KG         Exercise 2    Exercise Name 2 Seated john levator scap stretch  -KG      Reps 2 2  -KG      Time 2 30 sec hold  -KG         Exercise 3    Exercise Name 3 Low doorway pec stretch  -KG      Reps 3 2  -KG      Time 3 30 sec hold  -KG         Exercise 4    Exercise Name 4 Standing tband mid rows  -KG      Cueing 4 Verbal  -KG      Sets 4 2  -KG      Reps 4 10  -KG         Exercise 5    Exercise Name 5 Standing john shoulder ext/scap retraction with posture  -KG      Sets 5 2  -KG      Reps 5 10  -KG         Exercise 6    Exercise Name 6 Seated tband high rows  -KG      Sets 6 2  -KG      Reps 6 10  -KG         Exercise 7    Exercise Name 7 Prone over pball john shoulder ext  -KG      Cueing 7 Verbal  -KG      Sets 7 2  -KG      Reps 7 10  -KG         Exercise 8    Exercise Name 8 Prone over pball john shoulder horiz abd  -KG      Cueing 8 Verbal  -KG      Sets 8 2  -KG      Reps 8 10  -KG         Exercise 9    Exercise Name 9 Prone over pball john low trap Y  -KG      Cueing 9 Verbal  -KG      Sets 9 1  -KG      Reps 9 15  -KG         Exercise 10    Exercise Name 10 Cspine ball/wall rotation  -KG         Exercise 11    Exercise Name 11 C-spine ball/wall B SB & flex isometrics  -KG      Cueing 11 Verbal  -KG      Sets 11 1  -KG      Reps 11 12 ea  -KG      Time 11 5\" hold  -KG                User Key  (r) = Recorded By, (t) = Taken By, (c) = Cosigned By      Initials Name Provider Type    KG Nupur Orr, PT Physical Therapist                             Manual Rx (last 36 hours)       Manual Treatments       Row Name 09/19/23 0800             Manual Rx 1    Manual Rx 1 Location Prone: R>L interscap border, levator scap, UT  -KG      Manual Rx 1 Type " STM/MFR  -KG      Manual Rx 1 Duration 6 min  -KG         Manual Rx 2    Manual Rx 2 Location Supine: cervical spine  -KG      Manual Rx 2 Type lateral glides C5/6, UPA's and C5-7, UT/levator STM  -KG      Manual Rx 2 Duration 5 min  -KG                User Key  (r) = Recorded By, (t) = Taken By, (c) = Cosigned By      Initials Name Provider Type    KG Nupur Orr, PT Physical Therapist                     PT OP Goals       Row Name 09/19/23 0800          PT Short Term Goals    STG Date to Achieve 09/13/23  -KG     STG 1 Demonstrate independence/compliance in performance of initial HEP  -KG     STG 1 Progress Met  -KG     STG 2 Demonstrate improved seated posture/positioning with seated scap/cervical stabilization activities with minimal cues required for correction  -KG     STG 2 Progress Met  -KG     STG 3 Subjectively report 25% improvement in RUE radiating symptoms throughout a full work day  -KG     STG 3 Progress Progressing  -KG     STG 4 Demosntrate improved L cervical lateral flex to 30 deg or greater without increased pain on R  -KG     STG 4 Progress Progressing  -KG        Long Term Goals    LTG Date to Achieve 10/04/23  -KG     LTG 1 Subjectively report 50% overall improvement in pain/symptoms and functional activity tolerance/performance  -KG     LTG 1 Progress Progressing  -KG     LTG 2 Improve NDI score to 30% or less demonstrating imrpoved tolerance to daily functional activities  -KG     LTG 2 Progress Progressing  -KG     LTG 3 Demonstrate improve R shoulder flex/abd MMT to 5/5  -KG     LTG 3 Progress Progressing  -KG     LTG 4 Demonstrate improved R cervical rotation AROM to 65 deg without increased ipsilateral pain/symptoms  -KG     LTG 4 Progress Partially Met;Progressing  -KG     LTG 5 Demonstrate proper lifting/body mechanics when lifting lightly weighted crate from various surface heights without increased pain  -KG     LTG 5 Progress Progressing  -KG     LTG 6 Subjectively  report/demonstrate no radiating RUE pain/symptoms distal to the elbow during full treatment session  -KG     LTG 6 Progress Progressing  -KG     LTG 7 Demonstrate improved R  strength to 75# or greater to assist with performance of work activities  -KG     LTG 7 Progress Progressing  -KG        Time Calculation    PT Goal Re-Cert Due Date 10/02/23  -KG               User Key  (r) = Recorded By, (t) = Taken By, (c) = Cosigned By      Initials Name Provider Type    KG Nupur Orr, PT Physical Therapist                    Therapy Education  Education Details: Prone YTI on table, pillow under hips  Given: HEP, Symptoms/condition management, Pain management, Posture/body mechanics  Program: Reinforced, Progressed  How Provided: Verbal, Demonstration, Written  Provided to: Patient  Level of Understanding: Verbalized, Demonstrated              Time Calculation:   Start Time: 0805  Stop Time: 0852  Time Calculation (min): 47 min  Therapy Charges for Today       Code Description Service Date Service Provider Modifiers Qty    10233290957  PT THER PROC EA 15 MIN 9/19/2023 Nupur Orr, PT GP 2    [id number]  PT MANUAL THERAPY EA 15 MIN 9/19/2023 Nupur Orr, PT GP 1                      Nupur Orr, PT  9/19/2023

## 2023-09-21 ENCOUNTER — APPOINTMENT (OUTPATIENT)
Dept: PHYSICAL THERAPY | Facility: HOSPITAL | Age: 38
End: 2023-09-21
Payer: COMMERCIAL

## 2025-02-14 ENCOUNTER — APPOINTMENT (OUTPATIENT)
Dept: GENERAL RADIOLOGY | Age: 40
End: 2025-02-14
Payer: COMMERCIAL

## 2025-02-14 ENCOUNTER — HOSPITAL ENCOUNTER (EMERGENCY)
Age: 40
Discharge: HOME | End: 2025-02-14
Attending: EMERGENCY MEDICINE
Payer: COMMERCIAL

## 2025-02-14 VITALS
SYSTOLIC BLOOD PRESSURE: 128 MMHG | DIASTOLIC BLOOD PRESSURE: 82 MMHG | TEMPERATURE: 98.5 F | BODY MASS INDEX: 27.89 KG/M2 | HEART RATE: 75 BPM | OXYGEN SATURATION: 92 % | RESPIRATION RATE: 13 BRPM | WEIGHT: 200 LBS

## 2025-02-14 DIAGNOSIS — T20.20XA PARTIAL THICKNESS BURN OF FACE, INITIAL ENCOUNTER: Primary | ICD-10-CM

## 2025-02-14 LAB
ALBUMIN SERPL-MCNC: 4.8 G/DL (ref 3.5–5.2)
ALP SERPL-CCNC: 53 U/L (ref 40–129)
ALT SERPL-CCNC: 22 U/L (ref 5–41)
ANION GAP SERPL CALCULATED.3IONS-SCNC: 13 MMOL/L (ref 8–16)
AST SERPL-CCNC: 26 U/L (ref 5–40)
BASOPHILS # BLD: 0 K/UL (ref 0–0.2)
BASOPHILS NFR BLD: 0.4 % (ref 0–1)
BILIRUB SERPL-MCNC: 0.4 MG/DL (ref 0.2–1.2)
BUN SERPL-MCNC: 21 MG/DL (ref 6–20)
CALCIUM SERPL-MCNC: 9.9 MG/DL (ref 8.6–10)
CHLORIDE SERPL-SCNC: 104 MMOL/L (ref 98–107)
CO2 SERPL-SCNC: 25 MMOL/L (ref 22–29)
CREAT SERPL-MCNC: 1 MG/DL (ref 0.7–1.2)
EOSINOPHIL # BLD: 0 K/UL (ref 0–0.6)
EOSINOPHIL NFR BLD: 0.4 % (ref 0–5)
ERYTHROCYTE [DISTWIDTH] IN BLOOD BY AUTOMATED COUNT: 12.9 % (ref 11.5–14.5)
GLUCOSE SERPL-MCNC: 106 MG/DL (ref 70–99)
HCT VFR BLD AUTO: 43 % (ref 42–52)
HGB BLD-MCNC: 14.4 G/DL (ref 14–18)
IMM GRANULOCYTES # BLD: 0 K/UL
INR PPP: 0.98 (ref 0.88–1.18)
LYMPHOCYTES # BLD: 2.3 K/UL (ref 1.1–4.5)
LYMPHOCYTES NFR BLD: 29 % (ref 20–40)
MCH RBC QN AUTO: 29.6 PG (ref 27–31)
MCHC RBC AUTO-ENTMCNC: 33.5 G/DL (ref 33–37)
MCV RBC AUTO: 88.3 FL (ref 80–94)
MONOCYTES # BLD: 0.5 K/UL (ref 0–0.9)
MONOCYTES NFR BLD: 6.6 % (ref 0–10)
NEUTROPHILS # BLD: 4.9 K/UL (ref 1.5–7.5)
NEUTS SEG NFR BLD: 63.2 % (ref 50–65)
PLATELET # BLD AUTO: 233 K/UL (ref 130–400)
PMV BLD AUTO: 9.3 FL (ref 9.4–12.4)
POTASSIUM SERPL-SCNC: 4 MMOL/L (ref 3.5–5.1)
PROT SERPL-MCNC: 7.4 G/DL (ref 6.4–8.3)
PROTHROMBIN TIME: 12.7 SEC (ref 12–14.6)
RBC # BLD AUTO: 4.87 M/UL (ref 4.7–6.1)
SODIUM SERPL-SCNC: 142 MMOL/L (ref 136–145)
WBC # BLD AUTO: 7.8 K/UL (ref 4.8–10.8)

## 2025-02-14 PROCEDURE — 36415 COLL VENOUS BLD VENIPUNCTURE: CPT

## 2025-02-14 PROCEDURE — 71045 X-RAY EXAM CHEST 1 VIEW: CPT

## 2025-02-14 PROCEDURE — 2580000003 HC RX 258: Performed by: EMERGENCY MEDICINE

## 2025-02-14 PROCEDURE — 99291 CRITICAL CARE FIRST HOUR: CPT

## 2025-02-14 PROCEDURE — 85025 COMPLETE CBC W/AUTO DIFF WBC: CPT

## 2025-02-14 PROCEDURE — 6370000000 HC RX 637 (ALT 250 FOR IP): Performed by: EMERGENCY MEDICINE

## 2025-02-14 PROCEDURE — 80053 COMPREHEN METABOLIC PANEL: CPT

## 2025-02-14 PROCEDURE — 85610 PROTHROMBIN TIME: CPT

## 2025-02-14 PROCEDURE — 6360000002 HC RX W HCPCS: Performed by: EMERGENCY MEDICINE

## 2025-02-14 RX ORDER — ONDANSETRON 2 MG/ML
4 INJECTION INTRAMUSCULAR; INTRAVENOUS ONCE
Status: COMPLETED | OUTPATIENT
Start: 2025-02-14 | End: 2025-02-14

## 2025-02-14 RX ORDER — OXYCODONE AND ACETAMINOPHEN 5; 325 MG/1; MG/1
1 TABLET ORAL EVERY 6 HOURS PRN
Qty: 12 TABLET | Refills: 0 | Status: SHIPPED | OUTPATIENT
Start: 2025-02-14 | End: 2025-02-17

## 2025-02-14 RX ORDER — BACITRACIN ZINC AND POLYMYXIN B SULFATE 500; 1000 [USP'U]/G; [USP'U]/G
OINTMENT TOPICAL
Qty: 4 EACH | Refills: 0 | Status: SHIPPED | OUTPATIENT
Start: 2025-02-14 | End: 2025-02-21

## 2025-02-14 RX ORDER — MORPHINE SULFATE 4 MG/ML
4 INJECTION, SOLUTION INTRAMUSCULAR; INTRAVENOUS ONCE
Status: COMPLETED | OUTPATIENT
Start: 2025-02-14 | End: 2025-02-14

## 2025-02-14 RX ORDER — ONDANSETRON 4 MG/1
4 TABLET, ORALLY DISINTEGRATING ORAL ONCE
Status: DISCONTINUED | OUTPATIENT
Start: 2025-02-14 | End: 2025-02-14

## 2025-02-14 RX ORDER — GINSENG 100 MG
CAPSULE ORAL 3 TIMES DAILY
Status: DISCONTINUED | OUTPATIENT
Start: 2025-02-14 | End: 2025-02-14 | Stop reason: HOSPADM

## 2025-02-14 RX ORDER — SODIUM CHLORIDE, SODIUM LACTATE, POTASSIUM CHLORIDE, AND CALCIUM CHLORIDE .6; .31; .03; .02 G/100ML; G/100ML; G/100ML; G/100ML
1000 INJECTION, SOLUTION INTRAVENOUS ONCE
Status: COMPLETED | OUTPATIENT
Start: 2025-02-14 | End: 2025-02-14

## 2025-02-14 RX ADMIN — ONDANSETRON 4 MG: 2 INJECTION INTRAMUSCULAR; INTRAVENOUS at 12:00

## 2025-02-14 RX ADMIN — SODIUM CHLORIDE, SODIUM LACTATE, POTASSIUM CHLORIDE, AND CALCIUM CHLORIDE 1000 ML: 600; 310; 30; 20 INJECTION, SOLUTION INTRAVENOUS at 12:11

## 2025-02-14 RX ADMIN — MORPHINE SULFATE 4 MG: 4 INJECTION, SOLUTION INTRAMUSCULAR; INTRAVENOUS at 12:00

## 2025-02-14 RX ADMIN — MORPHINE SULFATE 4 MG: 4 INJECTION, SOLUTION INTRAMUSCULAR; INTRAVENOUS at 12:57

## 2025-02-14 RX ADMIN — BACITRACIN: 500 OINTMENT TOPICAL at 12:58

## 2025-02-14 ASSESSMENT — ENCOUNTER SYMPTOMS
RESPIRATORY NEGATIVE: 1
EYES NEGATIVE: 1
GASTROINTESTINAL NEGATIVE: 1
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
## 2025-02-14 NOTE — CARE COORDINATION
Facesheet sent to [facility].     When received, they will send over copy of instructions and upcoming appointment.

## 2025-04-13 ENCOUNTER — HOSPITAL ENCOUNTER (EMERGENCY)
Age: 40
Discharge: HOME | End: 2025-04-13
Attending: EMERGENCY MEDICINE
Payer: COMMERCIAL

## 2025-04-13 ENCOUNTER — APPOINTMENT (OUTPATIENT)
Dept: GENERAL RADIOLOGY | Age: 40
End: 2025-04-13
Payer: COMMERCIAL

## 2025-04-13 VITALS
SYSTOLIC BLOOD PRESSURE: 124 MMHG | RESPIRATION RATE: 16 BRPM | WEIGHT: 205 LBS | DIASTOLIC BLOOD PRESSURE: 80 MMHG | BODY MASS INDEX: 28.7 KG/M2 | HEIGHT: 71 IN | TEMPERATURE: 97.4 F | OXYGEN SATURATION: 98 % | HEART RATE: 59 BPM

## 2025-04-13 DIAGNOSIS — S61.011A LACERATION OF RIGHT THUMB WITHOUT FOREIGN BODY WITHOUT DAMAGE TO NAIL, INITIAL ENCOUNTER: Primary | ICD-10-CM

## 2025-04-13 DIAGNOSIS — S69.91XA INJURY OF RIGHT HAND, INITIAL ENCOUNTER: ICD-10-CM

## 2025-04-13 PROCEDURE — 73130 X-RAY EXAM OF HAND: CPT

## 2025-04-13 PROCEDURE — 12001 RPR S/N/AX/GEN/TRNK 2.5CM/<: CPT

## 2025-04-13 PROCEDURE — 6360000002 HC RX W HCPCS

## 2025-04-13 PROCEDURE — 99283 EMERGENCY DEPT VISIT LOW MDM: CPT

## 2025-04-13 RX ORDER — LIDOCAINE HYDROCHLORIDE 10 MG/ML
5 INJECTION, SOLUTION EPIDURAL; INFILTRATION; INTRACAUDAL; PERINEURAL ONCE
Status: COMPLETED | OUTPATIENT
Start: 2025-04-13 | End: 2025-04-13

## 2025-04-13 RX ORDER — LIDOCAINE HYDROCHLORIDE 10 MG/ML
INJECTION, SOLUTION EPIDURAL; INFILTRATION; INTRACAUDAL; PERINEURAL
Status: COMPLETED
Start: 2025-04-13 | End: 2025-04-13

## 2025-04-13 RX ORDER — ALBUTEROL SULFATE 90 UG/1
INHALANT RESPIRATORY (INHALATION)
COMMUNITY

## 2025-04-13 RX ADMIN — LIDOCAINE HYDROCHLORIDE 5 ML: 10 INJECTION, SOLUTION EPIDURAL; INFILTRATION; INTRACAUDAL at 02:32

## 2025-04-13 RX ADMIN — LIDOCAINE HYDROCHLORIDE 5 ML: 10 INJECTION, SOLUTION EPIDURAL; INFILTRATION; INTRACAUDAL; PERINEURAL at 02:32

## 2025-04-13 ASSESSMENT — PAIN SCALES - GENERAL: PAINLEVEL_OUTOF10: 2

## 2025-04-13 ASSESSMENT — PAIN - FUNCTIONAL ASSESSMENT: PAIN_FUNCTIONAL_ASSESSMENT: 0-10
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
## 2025-04-25 ENCOUNTER — HOSPITAL ENCOUNTER (EMERGENCY)
Age: 40
Discharge: HOME | End: 2025-04-26
Payer: COMMERCIAL

## 2025-04-25 VITALS
RESPIRATION RATE: 20 BRPM | OXYGEN SATURATION: 98 % | HEART RATE: 64 BPM | SYSTOLIC BLOOD PRESSURE: 138 MMHG | DIASTOLIC BLOOD PRESSURE: 80 MMHG | TEMPERATURE: 97.3 F

## 2025-04-25 DIAGNOSIS — Z48.02 VISIT FOR SUTURE REMOVAL: Primary | ICD-10-CM

## 2025-04-25 PROCEDURE — 99282 EMERGENCY DEPT VISIT SF MDM: CPT

## 2025-04-25 ASSESSMENT — PAIN - FUNCTIONAL ASSESSMENT: PAIN_FUNCTIONAL_ASSESSMENT: NONE - DENIES PAIN
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
## 2025-04-26 NOTE — ED NOTES
Sutures removed from pt thumb, steri strips applied per verbal from NP. Pt tolerated without evidence of difficulty or distress.

## 2025-04-26 NOTE — DISCHARGE INSTRUCTIONS
Continue to keep the wound protected.  Allow the Steri-Strips to fall off on their own.  You may want to also consider keeping a Band-Aid over the wound.  Return to ER for any new, worsening, or change in condition.

## (undated) DEVICE — PAD LAP CHOLE: Brand: MEDLINE INDUSTRIES, INC.

## (undated) DEVICE — ENDOPATH XCEL WITH OPTIVIEW TECHNOLOGY DILATING TIP TROCARS WITH STABILITY SLEEVES: Brand: ENDOPATH XCEL OPTIVIEW

## (undated) DEVICE — SURGICAL SUCTION CONNECTING TUBE WITH MALE CONNECTOR AND SUCTION CLAMP, 2 FT. LONG (.6 M), 5 MM I.D.: Brand: CONMED

## (undated) DEVICE — ANTIBACTERIAL UNDYED BRAIDED (POLYGLACTIN 910), SYNTHETIC ABSORBABLE SUTURE: Brand: COATED VICRYL

## (undated) DEVICE — MONOPOLAR METZENBAUM SCISSOR, MINI BLADE TIP, DISPOSABLE: Brand: MONOPOLAR METZENBAUM SCISSOR, MINI BLADE TIP, DISPOSABLE

## (undated) DEVICE — PENCL ES MEGADINE EZ/CLEAN BUTN W/HOLSTR 10FT

## (undated) DEVICE — SYR CONTRL LUERLOK 10CC

## (undated) DEVICE — 3M™ STERI-STRIP™ REINFORCED ADHESIVE SKIN CLOSURES, R1547, 1/2 IN X 4 IN (12 MM X 100 MM), 6 STRIPS/ENVELOPE: Brand: 3M™ STERI-STRIP™

## (undated) DEVICE — PK TURNOVER RM ADV

## (undated) DEVICE — DISPOSABLE IRRIGATION BIPOLAR CORD, M1000 TYPE: Brand: KIRWAN

## (undated) DEVICE — SINGLE PORT MANIFOLD: Brand: NEPTUNE 2

## (undated) DEVICE — BANDAGE,GAUZE,BULKEE II,4.5"X4.1YD,STRL: Brand: MEDLINE

## (undated) DEVICE — CANNULA NSL AD L7FT DIV O2 CO2 W/ M LUERLOCK TRMPT CONN

## (undated) DEVICE — SUT MNCRYL 4/0 PS2 27IN UD MCP426H

## (undated) DEVICE — CONN FLX BREATHE CIRCT

## (undated) DEVICE — VAGINAL PREP TRAY: Brand: MEDLINE INDUSTRIES, INC.

## (undated) DEVICE — TBG PENCL TELESCP MEGADYNE SMOKE EVAC 10FT

## (undated) DEVICE — SUT VIC 0 SUTUPAK TIES 18IN J906G

## (undated) DEVICE — NDL HYPO PRECISIONGLIDE REG 22G 1 1/2

## (undated) DEVICE — ENDOPATH PNEUMONEEDLE INSUFFLATION NEEDLES WITH LUER LOCK CONNECTORS 120MM: Brand: ENDOPATH

## (undated) DEVICE — DRSNG TELFA PAD NONADH STR 1S 3X8IN

## (undated) DEVICE — ENDOPATH XCEL DILATING TIP TROCARS WITH STABILITY SLEEVES: Brand: ENDOPATH XCEL

## (undated) DEVICE — GLV SURG BIOGEL LTX PF 6 1/2

## (undated) DEVICE — PDS II VLT 0 107CM AG ST3: Brand: ENDOLOOP

## (undated) DEVICE — TOWEL,OR,DSP,ST,BLUE,STD,4/PK,20PK/CS: Brand: MEDLINE

## (undated) DEVICE — FORCEPS BX 240CM 2.4MM L NDL RAD JAW 4 M00513334

## (undated) DEVICE — TRY PREP SCRB VAG PVP

## (undated) DEVICE — GLV SURG BIOGEL LTX PF 8

## (undated) DEVICE — PK EXTRM 30

## (undated) DEVICE — BITE BLOCK ENDOSCP AD 60 FR W/ ADJ STRP PLAS GRN BLOX

## (undated) DEVICE — ELECTRD BLD EDGE/INSUL1P 2.4X5.1MM STRL

## (undated) DEVICE — ENDOPOUCH RETRIEVER SPECIMEN RETRIEVAL BAGS: Brand: ENDOPOUCH RETRIEVER

## (undated) DEVICE — 4-PORT MANIFOLD: Brand: NEPTUNE 2

## (undated) DEVICE — SUT NUROLON 3/0 RB1 CR8 18IN C553D

## (undated) DEVICE — 2, DISPOSABLE SUCTION/IRRIGATOR WITHOUT DISPOSABLE TIP: Brand: STRYKEFLOW

## (undated) DEVICE — PROTECTOR ULNA NERV

## (undated) DEVICE — ARM SLING II: Brand: DEROYAL

## (undated) DEVICE — ELECTRD L HK EZ CLN 33CM

## (undated) DEVICE — ENDOPATH XCEL WITH OPTIVIEW TECHNOLOGY UNIVERSAL TROCAR STABILITY SLEEVES: Brand: ENDOPATH XCEL OPTIVIEW

## (undated) DEVICE — TRAP FLD MINIVAC MEGADYNE 100ML

## (undated) DEVICE — LP VESL MAXI 2.5X1MM RED 2PK

## (undated) DEVICE — SPNG GZ WOVN 4X4IN 12PLY 10/BX STRL

## (undated) DEVICE — ENDO KIT: Brand: MEDLINE INDUSTRIES, INC.

## (undated) DEVICE — PROXIMATE RH ROTATING HEAD SKIN STAPLERS (35 REGULAR) CONTAINS 35 STAINLESS STEEL STAPLES: Brand: PROXIMATE